# Patient Record
Sex: FEMALE | Race: OTHER | Employment: PART TIME | URBAN - METROPOLITAN AREA
[De-identification: names, ages, dates, MRNs, and addresses within clinical notes are randomized per-mention and may not be internally consistent; named-entity substitution may affect disease eponyms.]

---

## 2017-03-11 ENCOUNTER — HOSPITAL ENCOUNTER (EMERGENCY)
Facility: HOSPITAL | Age: 32
Discharge: HOME | End: 2017-03-11
Attending: EMERGENCY MEDICINE
Payer: MEDICAID

## 2017-03-11 ENCOUNTER — APPOINTMENT (OUTPATIENT)
Dept: CT IMAGING | Facility: HOSPITAL | Age: 32
End: 2017-03-11
Attending: EMERGENCY MEDICINE
Payer: MEDICAID

## 2017-03-11 VITALS
BODY MASS INDEX: 42.22 KG/M2 | DIASTOLIC BLOOD PRESSURE: 87 MMHG | HEART RATE: 72 BPM | OXYGEN SATURATION: 100 % | RESPIRATION RATE: 16 BRPM | TEMPERATURE: 98 F | HEIGHT: 67 IN | WEIGHT: 269 LBS | SYSTOLIC BLOOD PRESSURE: 159 MMHG

## 2017-03-11 DIAGNOSIS — R56.9 SEIZURE-LIKE ACTIVITY (HCC): Primary | ICD-10-CM

## 2017-03-11 LAB
ALBUMIN SERPL-MCNC: 4 G/DL (ref 3.5–4.8)
ALP LIVER SERPL-CCNC: 70 U/L (ref 37–98)
ALT SERPL-CCNC: 25 U/L (ref 14–54)
AST SERPL-CCNC: 16 U/L (ref 15–41)
BASOPHILS # BLD AUTO: 0.01 X10(3) UL (ref 0–0.1)
BASOPHILS NFR BLD AUTO: 0.2 %
BILIRUB SERPL-MCNC: 0.4 MG/DL (ref 0.1–2)
BILIRUB UR QL STRIP.AUTO: NEGATIVE
BUN BLD-MCNC: 17 MG/DL (ref 8–20)
CALCIUM BLD-MCNC: 9 MG/DL (ref 8.3–10.3)
CHLORIDE: 104 MMOL/L (ref 101–111)
CLARITY UR REFRACT.AUTO: CLEAR
CO2: 24 MMOL/L (ref 22–32)
COLOR UR AUTO: YELLOW
CREAT BLD-MCNC: 0.73 MG/DL (ref 0.55–1.02)
EOSINOPHIL # BLD AUTO: 0.01 X10(3) UL (ref 0–0.3)
EOSINOPHIL NFR BLD AUTO: 0.2 %
ERYTHROCYTE [DISTWIDTH] IN BLOOD BY AUTOMATED COUNT: 15.8 % (ref 11.5–16)
GLUCOSE BLD-MCNC: 76 MG/DL (ref 70–99)
GLUCOSE UR STRIP.AUTO-MCNC: NEGATIVE MG/DL
HCT VFR BLD AUTO: 36.2 % (ref 34–50)
HGB BLD-MCNC: 11.4 G/DL (ref 12–16)
IMMATURE GRANULOCYTE COUNT: 0 X10(3) UL (ref 0–1)
IMMATURE GRANULOCYTE RATIO %: 0 %
KETONES UR STRIP.AUTO-MCNC: 80 MG/DL
LYMPHOCYTES # BLD AUTO: 1.59 X10(3) UL (ref 0.9–4)
LYMPHOCYTES NFR BLD AUTO: 36.7 %
M PROTEIN MFR SERPL ELPH: 8 G/DL (ref 6.1–8.3)
MCH RBC QN AUTO: 24.3 PG (ref 27–33.2)
MCHC RBC AUTO-ENTMCNC: 31.5 G/DL (ref 31–37)
MCV RBC AUTO: 77 FL (ref 81–100)
MONOCYTES # BLD AUTO: 0.34 X10(3) UL (ref 0.1–0.6)
MONOCYTES NFR BLD AUTO: 7.9 %
NEUTROPHIL ABS PRELIM: 2.38 X10 (3) UL (ref 1.3–6.7)
NEUTROPHILS # BLD AUTO: 2.38 X10(3) UL (ref 1.3–6.7)
NEUTROPHILS NFR BLD AUTO: 55 %
NITRITE UR QL STRIP.AUTO: NEGATIVE
PH UR STRIP.AUTO: 5 [PH] (ref 4.5–8)
PLATELET # BLD AUTO: 307 10(3)UL (ref 150–450)
POCT LOT NUMBER: NORMAL
POCT URINE PREGNANCY: NEGATIVE
POTASSIUM SERPL-SCNC: 3.8 MMOL/L (ref 3.6–5.1)
PROT UR STRIP.AUTO-MCNC: NEGATIVE MG/DL
RBC # BLD AUTO: 4.7 X10(6)UL (ref 3.8–5.1)
RBC UR QL AUTO: NEGATIVE
RED CELL DISTRIBUTION WIDTH-SD: 43 FL (ref 35.1–46.3)
SODIUM SERPL-SCNC: 137 MMOL/L (ref 136–144)
SP GR UR STRIP.AUTO: 1.03 (ref 1–1.03)
UROBILINOGEN UR STRIP.AUTO-MCNC: <2 MG/DL
WBC # BLD AUTO: 4.3 X10(3) UL (ref 4–13)

## 2017-03-11 PROCEDURE — 99284 EMERGENCY DEPT VISIT MOD MDM: CPT

## 2017-03-11 PROCEDURE — 81025 URINE PREGNANCY TEST: CPT

## 2017-03-11 PROCEDURE — 70450 CT HEAD/BRAIN W/O DYE: CPT

## 2017-03-11 PROCEDURE — 87086 URINE CULTURE/COLONY COUNT: CPT | Performed by: EMERGENCY MEDICINE

## 2017-03-11 PROCEDURE — 85025 COMPLETE CBC W/AUTO DIFF WBC: CPT | Performed by: EMERGENCY MEDICINE

## 2017-03-11 PROCEDURE — 80053 COMPREHEN METABOLIC PANEL: CPT | Performed by: EMERGENCY MEDICINE

## 2017-03-11 PROCEDURE — 36415 COLL VENOUS BLD VENIPUNCTURE: CPT

## 2017-03-11 PROCEDURE — 81001 URINALYSIS AUTO W/SCOPE: CPT | Performed by: EMERGENCY MEDICINE

## 2017-03-11 NOTE — ED INITIAL ASSESSMENT (HPI)
PT TO ER STATING SHE WOKE UP SHAKING. PER AUNT, PT COULDN'T MOVE HER RT ARM AND LEGS, AND HER EYES WERE ROLLED BACK.  PT STATES HER SYMPTOMS COMES AND GO.

## 2017-03-11 NOTE — ED PROVIDER NOTES
Patient Seen in: [facility] Emergency Department    History   Patient presents with:  Numbness Weakness (neurologic)    Stated Complaint: arm weakness    HPI    30-year-old female complaining of stiffening of her arm.   This patient states her aunt c 1123 161/98 mmHg   Pulse 03/11/17 1123 72   Resp 03/11/17 1123 18   Temp 03/11/17 1123 97.7 °F (36.5 °C)   Temp src 03/11/17 1123 Temporal   SpO2 03/11/17 1123 100 %   O2 Device 03/11/17 1123 None (Room air)       Current:/99 mmHg  Pulse 67  Temp(Src GOLD   RAINBOW DRAW LAVENDER   RAINBOW DRAW LIGHT GREEN   URINE CULTURE, ROUTINE       MDM   IV was started labs are drawn patient's labs unremarkable CT scan of the brain is negative the patient felt better with less stiffness in her arm prior to discharg

## 2017-03-11 NOTE — ED NOTES
PT WAS ADVISED TO CHECK HER BP AT HOME AND KEEP A DIARY TO SHOW TO HER PCP. PT VERBALIZED UNDERSTANDING.

## 2023-01-18 ENCOUNTER — APPOINTMENT (OUTPATIENT)
Dept: CT IMAGING | Facility: HOSPITAL | Age: 38
End: 2023-01-18
Attending: EMERGENCY MEDICINE
Payer: MEDICAID

## 2023-01-18 ENCOUNTER — HOSPITAL ENCOUNTER (EMERGENCY)
Facility: HOSPITAL | Age: 38
Discharge: HOME | End: 2023-01-19
Attending: EMERGENCY MEDICINE
Payer: MEDICAID

## 2023-01-18 DIAGNOSIS — R51.9 NONINTRACTABLE HEADACHE, UNSPECIFIED CHRONICITY PATTERN, UNSPECIFIED HEADACHE TYPE: Primary | ICD-10-CM

## 2023-01-18 DIAGNOSIS — J01.90 ACUTE SINUSITIS, RECURRENCE NOT SPECIFIED, UNSPECIFIED LOCATION: ICD-10-CM

## 2023-01-18 LAB
ALBUMIN SERPL-MCNC: 3.2 G/DL (ref 3.4–5)
ALBUMIN/GLOB SERPL: 0.7 {RATIO} (ref 1–2)
ALP LIVER SERPL-CCNC: 81 U/L
ALT SERPL-CCNC: 18 U/L
ANION GAP SERPL CALC-SCNC: 5 MMOL/L (ref 0–18)
AST SERPL-CCNC: 22 U/L (ref 15–37)
B-HCG UR QL: NEGATIVE
BASOPHILS # BLD AUTO: 0.02 X10(3) UL (ref 0–0.2)
BASOPHILS NFR BLD AUTO: 0.2 %
BILIRUB SERPL-MCNC: 0.3 MG/DL (ref 0.1–2)
BUN BLD-MCNC: 12 MG/DL (ref 7–18)
CALCIUM BLD-MCNC: 9.2 MG/DL (ref 8.5–10.1)
CHLORIDE SERPL-SCNC: 105 MMOL/L (ref 98–112)
CO2 SERPL-SCNC: 25 MMOL/L (ref 21–32)
CREAT BLD-MCNC: 0.97 MG/DL
EOSINOPHIL # BLD AUTO: 0.05 X10(3) UL (ref 0–0.7)
EOSINOPHIL NFR BLD AUTO: 0.6 %
ERYTHROCYTE [DISTWIDTH] IN BLOOD BY AUTOMATED COUNT: 14.6 %
GFR SERPLBLD BASED ON 1.73 SQ M-ARVRAT: 78 ML/MIN/1.73M2 (ref 60–?)
GLOBULIN PLAS-MCNC: 4.4 G/DL (ref 2.8–4.4)
GLUCOSE BLD-MCNC: 132 MG/DL (ref 70–99)
HCT VFR BLD AUTO: 34.5 %
HGB BLD-MCNC: 10.7 G/DL
IMM GRANULOCYTES # BLD AUTO: 0.02 X10(3) UL (ref 0–1)
IMM GRANULOCYTES NFR BLD: 0.2 %
LYMPHOCYTES # BLD AUTO: 1.62 X10(3) UL (ref 1–4)
LYMPHOCYTES NFR BLD AUTO: 19.5 %
MCH RBC QN AUTO: 23.9 PG (ref 26–34)
MCHC RBC AUTO-ENTMCNC: 31 G/DL (ref 31–37)
MCV RBC AUTO: 77.2 FL
MONOCYTES # BLD AUTO: 0.35 X10(3) UL (ref 0.1–1)
MONOCYTES NFR BLD AUTO: 4.2 %
NEUTROPHILS # BLD AUTO: 6.25 X10 (3) UL (ref 1.5–7.7)
NEUTROPHILS # BLD AUTO: 6.25 X10(3) UL (ref 1.5–7.7)
NEUTROPHILS NFR BLD AUTO: 75.3 %
OSMOLALITY SERPL CALC.SUM OF ELEC: 282 MOSM/KG (ref 275–295)
PLATELET # BLD AUTO: 370 10(3)UL (ref 150–450)
POTASSIUM SERPL-SCNC: 4.1 MMOL/L (ref 3.5–5.1)
PROT SERPL-MCNC: 7.6 G/DL (ref 6.4–8.2)
RBC # BLD AUTO: 4.47 X10(6)UL
SODIUM SERPL-SCNC: 135 MMOL/L (ref 136–145)
WBC # BLD AUTO: 8.3 X10(3) UL (ref 4–11)

## 2023-01-18 PROCEDURE — 96361 HYDRATE IV INFUSION ADD-ON: CPT

## 2023-01-18 PROCEDURE — 70498 CT ANGIOGRAPHY NECK: CPT | Performed by: EMERGENCY MEDICINE

## 2023-01-18 PROCEDURE — 81025 URINE PREGNANCY TEST: CPT

## 2023-01-18 PROCEDURE — 99285 EMERGENCY DEPT VISIT HI MDM: CPT

## 2023-01-18 PROCEDURE — 96375 TX/PRO/DX INJ NEW DRUG ADDON: CPT

## 2023-01-18 PROCEDURE — 85025 COMPLETE CBC W/AUTO DIFF WBC: CPT | Performed by: EMERGENCY MEDICINE

## 2023-01-18 PROCEDURE — 99284 EMERGENCY DEPT VISIT MOD MDM: CPT

## 2023-01-18 PROCEDURE — 70496 CT ANGIOGRAPHY HEAD: CPT | Performed by: EMERGENCY MEDICINE

## 2023-01-18 PROCEDURE — 80053 COMPREHEN METABOLIC PANEL: CPT | Performed by: EMERGENCY MEDICINE

## 2023-01-18 PROCEDURE — 96374 THER/PROPH/DIAG INJ IV PUSH: CPT

## 2023-01-18 RX ORDER — AMLODIPINE BESYLATE AND ATORVASTATIN CALCIUM 10; 10 MG/1; MG/1
1 TABLET, FILM COATED ORAL DAILY
COMMUNITY

## 2023-01-18 RX ORDER — METOCLOPRAMIDE HYDROCHLORIDE 5 MG/ML
10 INJECTION INTRAMUSCULAR; INTRAVENOUS ONCE
Status: COMPLETED | OUTPATIENT
Start: 2023-01-18 | End: 2023-01-18

## 2023-01-18 RX ORDER — DIPHENHYDRAMINE HYDROCHLORIDE 50 MG/ML
50 INJECTION INTRAMUSCULAR; INTRAVENOUS ONCE
Status: COMPLETED | OUTPATIENT
Start: 2023-01-18 | End: 2023-01-18

## 2023-01-18 RX ORDER — LOSARTAN POTASSIUM 100 MG/1
100 TABLET ORAL DAILY
COMMUNITY

## 2023-01-18 RX ORDER — METOPROLOL SUCCINATE 50 MG/1
50 TABLET, EXTENDED RELEASE ORAL DAILY
COMMUNITY

## 2023-01-18 RX ORDER — HYDROCHLOROTHIAZIDE 25 MG/1
25 TABLET ORAL DAILY
COMMUNITY

## 2023-01-19 VITALS
DIASTOLIC BLOOD PRESSURE: 86 MMHG | TEMPERATURE: 97 F | SYSTOLIC BLOOD PRESSURE: 162 MMHG | RESPIRATION RATE: 23 BRPM | OXYGEN SATURATION: 98 % | HEART RATE: 92 BPM

## 2023-01-19 RX ORDER — DOXYCYCLINE HYCLATE 100 MG/1
100 CAPSULE ORAL 2 TIMES DAILY
Qty: 14 CAPSULE | Refills: 0 | Status: SHIPPED | OUTPATIENT
Start: 2023-01-19 | End: 2023-01-26

## 2023-01-19 RX ORDER — METOCLOPRAMIDE 10 MG/1
10 TABLET ORAL 3 TIMES DAILY PRN
Qty: 20 TABLET | Refills: 0 | Status: SHIPPED | OUTPATIENT
Start: 2023-01-19 | End: 2023-02-18

## 2023-01-19 NOTE — DISCHARGE INSTRUCTIONS
Call and follow-up with Dr. Jasson Hilario tomorrow. Follow-up with your doctor. Return if any severe headache, fevers or chills. You were seen in the emergency room in a limited time. There is a possibility that although we do not see any acute process at this present time that things can change with time. Is therefore imperative that you follow-up with primary care physician for close follow-up. If there is any significant progression of your pain  or other symptoms you to return immediately to the emergency room.

## 2023-01-19 NOTE — ED INITIAL ASSESSMENT (HPI)
Around December 17th, watery substance leaking out of nostril. Pt started experiencing headaches as well. Went to  and was diagnosed with double ear infection and given nasal spray for runny nose. Went to Er and did CT scan and MRI and had a possible CSF leak, tried to transfer out but no available beds so was referred outpt to a CSF clinic with appointment in February. Was told to come here for increasing headache.

## 2023-01-27 ENCOUNTER — TELEPHONE (OUTPATIENT)
Dept: NEUROLOGY | Facility: CLINIC | Age: 38
End: 2023-01-27

## 2023-01-27 PROBLEM — F41.8 MIXED ANXIETY AND DEPRESSIVE DISORDER: Status: ACTIVE | Noted: 2021-05-26

## 2023-01-27 PROBLEM — D50.9 IRON DEFICIENCY ANEMIA: Status: ACTIVE | Noted: 2019-03-08

## 2023-01-27 PROBLEM — I10 BENIGN ESSENTIAL HYPERTENSION: Status: ACTIVE | Noted: 2019-03-08

## 2023-01-27 PROBLEM — T78.40XA ALLERGY: Status: ACTIVE | Noted: 2023-01-27

## 2023-01-27 PROBLEM — J45.909 ASTHMA: Status: ACTIVE | Noted: 2023-01-27

## 2023-01-27 PROBLEM — J45.909 ASTHMA (HCC): Status: ACTIVE | Noted: 2023-01-27

## 2023-01-27 PROBLEM — R56.9 SEIZURE (HCC): Status: ACTIVE | Noted: 2018-04-06

## 2023-01-30 ENCOUNTER — APPOINTMENT (OUTPATIENT)
Dept: CT IMAGING | Facility: HOSPITAL | Age: 38
End: 2023-01-30
Payer: MEDICAID

## 2023-01-30 ENCOUNTER — HOSPITAL ENCOUNTER (INPATIENT)
Facility: HOSPITAL | Age: 38
End: 2023-01-30
Attending: HOSPITALIST | Admitting: HOSPITALIST
Payer: MEDICAID

## 2023-01-30 ENCOUNTER — OFFICE VISIT (OUTPATIENT)
Dept: SURGERY | Facility: CLINIC | Age: 38
End: 2023-01-30
Payer: MEDICAID

## 2023-01-30 ENCOUNTER — APPOINTMENT (OUTPATIENT)
Dept: INTERVENTIONAL RADIOLOGY/VASCULAR | Facility: HOSPITAL | Age: 38
End: 2023-01-30
Payer: MEDICAID

## 2023-01-30 ENCOUNTER — HOSPITAL ENCOUNTER (INPATIENT)
Facility: HOSPITAL | Age: 38
LOS: 14 days | Discharge: HOME | End: 2023-02-13
Attending: HOSPITALIST | Admitting: HOSPITALIST
Payer: MEDICAID

## 2023-01-30 ENCOUNTER — TELEPHONE (OUTPATIENT)
Dept: NEUROLOGY | Facility: CLINIC | Age: 38
End: 2023-01-30

## 2023-01-30 ENCOUNTER — TELEPHONE (OUTPATIENT)
Dept: SURGERY | Facility: CLINIC | Age: 38
End: 2023-01-30

## 2023-01-30 VITALS
WEIGHT: 293 LBS | DIASTOLIC BLOOD PRESSURE: 72 MMHG | SYSTOLIC BLOOD PRESSURE: 130 MMHG | BODY MASS INDEX: 45.99 KG/M2 | OXYGEN SATURATION: 98 % | HEART RATE: 76 BPM | HEIGHT: 67 IN

## 2023-01-30 DIAGNOSIS — G96.01 CSF LEAK FROM NOSE: Primary | ICD-10-CM

## 2023-01-30 DIAGNOSIS — G93.2 IIH (IDIOPATHIC INTRACRANIAL HYPERTENSION): ICD-10-CM

## 2023-01-30 PROBLEM — G96.00 CSF LEAK: Status: ACTIVE | Noted: 2023-01-30

## 2023-01-30 LAB
ANION GAP SERPL CALC-SCNC: 4 MMOL/L (ref 0–18)
ANTIBODY SCREEN: NEGATIVE
APTT PPP: 28.6 SECONDS (ref 23.3–35.6)
BASOPHILS # BLD AUTO: 0.03 X10(3) UL (ref 0–0.2)
BASOPHILS NFR BLD AUTO: 0.6 %
BUN BLD-MCNC: 10 MG/DL (ref 7–18)
CALCIUM BLD-MCNC: 8.6 MG/DL (ref 8.5–10.1)
CHLORIDE SERPL-SCNC: 107 MMOL/L (ref 98–112)
CLARITY CSF: CLEAR
CO2 SERPL-SCNC: 26 MMOL/L (ref 21–32)
COLOR CSF: COLORLESS
COUNT PERFORMED ON TUBE: 1
CREAT BLD-MCNC: 0.85 MG/DL
EOSINOPHIL # BLD AUTO: 0.05 X10(3) UL (ref 0–0.7)
EOSINOPHIL NFR BLD AUTO: 0.9 %
ERYTHROCYTE [DISTWIDTH] IN BLOOD BY AUTOMATED COUNT: 14.7 %
GFR SERPLBLD BASED ON 1.73 SQ M-ARVRAT: 92 ML/MIN/1.73M2 (ref 60–?)
GLUCOSE BLD-MCNC: 92 MG/DL (ref 70–99)
GLUCOSE CSF-MCNC: 59 MG/DL (ref 40–70)
HCT VFR BLD AUTO: 34.8 %
HGB BLD-MCNC: 10.3 G/DL
IMM GRANULOCYTES # BLD AUTO: 0.01 X10(3) UL (ref 0–1)
IMM GRANULOCYTES NFR BLD: 0.2 %
INR BLD: 1.16 (ref 0.85–1.16)
LYMPHOCYTES # BLD AUTO: 1.65 X10(3) UL (ref 1–4)
LYMPHOCYTES NFR BLD AUTO: 30.5 %
MCH RBC QN AUTO: 23.6 PG (ref 26–34)
MCHC RBC AUTO-ENTMCNC: 29.6 G/DL (ref 31–37)
MCV RBC AUTO: 79.8 FL
MONOCYTES # BLD AUTO: 0.28 X10(3) UL (ref 0.1–1)
MONOCYTES NFR BLD AUTO: 5.2 %
NEUTROPHILS # BLD AUTO: 3.39 X10 (3) UL (ref 1.5–7.7)
NEUTROPHILS # BLD AUTO: 3.39 X10(3) UL (ref 1.5–7.7)
NEUTROPHILS NFR BLD AUTO: 62.6 %
OSMOLALITY SERPL CALC.SUM OF ELEC: 283 MOSM/KG (ref 275–295)
PLATELET # BLD AUTO: 363 10(3)UL (ref 150–450)
POTASSIUM SERPL-SCNC: 3.8 MMOL/L (ref 3.5–5.1)
PROT PATTERN CSF ELPH-IMP: 22.1 MG/DL (ref 15–45)
PROTHROMBIN TIME: 14.8 SECONDS (ref 11.6–14.8)
RBC # BLD AUTO: 4.36 X10(6)UL
RBC # CSF: 31 /MM3 (ref ?–1)
RH BLOOD TYPE: POSITIVE
SODIUM SERPL-SCNC: 137 MMOL/L (ref 136–145)
TOTAL CELLS COUNTED CSF: 1 /MM3 (ref 0–5)
TOTAL VOLUME CSF: 7 ML
WBC # BLD AUTO: 5.4 X10(3) UL (ref 4–11)

## 2023-01-30 PROCEDURE — 3078F DIAST BP <80 MM HG: CPT | Performed by: OTHER

## 2023-01-30 PROCEDURE — 99223 1ST HOSP IP/OBS HIGH 75: CPT | Performed by: HOSPITALIST

## 2023-01-30 PROCEDURE — 3074F SYST BP LT 130 MM HG: CPT | Performed by: OTHER

## 2023-01-30 PROCEDURE — 70486 CT MAXILLOFACIAL W/O DYE: CPT

## 2023-01-30 PROCEDURE — 99291 CRITICAL CARE FIRST HOUR: CPT | Performed by: OTHER

## 2023-01-30 RX ORDER — LABETALOL HYDROCHLORIDE 5 MG/ML
10 INJECTION, SOLUTION INTRAVENOUS EVERY 2 HOUR PRN
Status: DISCONTINUED | OUTPATIENT
Start: 2023-01-30 | End: 2023-02-13

## 2023-01-30 RX ORDER — AMLODIPINE BESYLATE 10 MG/1
TABLET ORAL DAILY
Status: ON HOLD | COMMUNITY

## 2023-01-30 RX ORDER — PROCHLORPERAZINE EDISYLATE 5 MG/ML
5 INJECTION INTRAMUSCULAR; INTRAVENOUS EVERY 8 HOURS PRN
Status: DISCONTINUED | OUTPATIENT
Start: 2023-01-30 | End: 2023-02-03

## 2023-01-30 RX ORDER — LOSARTAN POTASSIUM 100 MG/1
100 TABLET ORAL DAILY
Status: DISCONTINUED | OUTPATIENT
Start: 2023-01-30 | End: 2023-02-13

## 2023-01-30 RX ORDER — POTASSIUM CHLORIDE 20 MEQ/1
40 TABLET, EXTENDED RELEASE ORAL ONCE
Status: COMPLETED | OUTPATIENT
Start: 2023-01-30 | End: 2023-01-30

## 2023-01-30 RX ORDER — SODIUM CHLORIDE 9 MG/ML
INJECTION, SOLUTION INTRAVENOUS CONTINUOUS
Status: DISCONTINUED | OUTPATIENT
Start: 2023-01-30 | End: 2023-01-31

## 2023-01-30 RX ORDER — ONDANSETRON 2 MG/ML
4 INJECTION INTRAMUSCULAR; INTRAVENOUS EVERY 6 HOURS PRN
Status: DISCONTINUED | OUTPATIENT
Start: 2023-01-30 | End: 2023-02-03

## 2023-01-30 RX ORDER — ONDANSETRON 4 MG/1
TABLET, ORALLY DISINTEGRATING ORAL
Status: ON HOLD | COMMUNITY

## 2023-01-30 RX ORDER — MIDAZOLAM HYDROCHLORIDE 1 MG/ML
INJECTION INTRAMUSCULAR; INTRAVENOUS
Status: COMPLETED
Start: 2023-01-30 | End: 2023-01-30

## 2023-01-30 RX ORDER — VENLAFAXINE HYDROCHLORIDE 225 MG/1
TABLET, EXTENDED RELEASE ORAL DAILY
COMMUNITY
End: 2023-01-30 | Stop reason: ALTCHOICE

## 2023-01-30 RX ORDER — QUETIAPINE FUMARATE 25 MG/1
TABLET, FILM COATED ORAL DAILY
COMMUNITY
End: 2023-01-30 | Stop reason: ALTCHOICE

## 2023-01-30 RX ORDER — CLINDAMYCIN HYDROCHLORIDE 300 MG/1
CAPSULE ORAL
COMMUNITY
End: 2023-01-30 | Stop reason: ALTCHOICE

## 2023-01-30 RX ORDER — HYDRALAZINE HYDROCHLORIDE 20 MG/ML
10 INJECTION INTRAMUSCULAR; INTRAVENOUS EVERY 2 HOUR PRN
Status: DISCONTINUED | OUTPATIENT
Start: 2023-01-30 | End: 2023-02-13

## 2023-01-30 RX ORDER — AMLODIPINE BESYLATE 5 MG/1
10 TABLET ORAL DAILY
Status: DISCONTINUED | OUTPATIENT
Start: 2023-01-30 | End: 2023-02-13

## 2023-01-30 RX ORDER — HYDROCODONE BITARTRATE AND ACETAMINOPHEN 10; 325 MG/1; MG/1
1 TABLET ORAL EVERY 4 HOURS PRN
Status: DISCONTINUED | OUTPATIENT
Start: 2023-01-30 | End: 2023-02-13

## 2023-01-30 RX ORDER — ACETAMINOPHEN 500 MG
500 TABLET ORAL EVERY 4 HOURS PRN
Status: DISCONTINUED | OUTPATIENT
Start: 2023-01-30 | End: 2023-02-13

## 2023-01-30 RX ORDER — METOPROLOL SUCCINATE 50 MG/1
50 TABLET, EXTENDED RELEASE ORAL
Status: DISCONTINUED | OUTPATIENT
Start: 2023-01-30 | End: 2023-02-13

## 2023-01-30 RX ORDER — FERROUS SULFATE 325(65) MG
TABLET ORAL DAILY
Status: ON HOLD | COMMUNITY

## 2023-01-30 RX ORDER — DEXAMETHASONE 1 MG
TABLET ORAL NIGHTLY
COMMUNITY
End: 2023-01-30 | Stop reason: ALTCHOICE

## 2023-01-30 RX ORDER — ENOXAPARIN SODIUM 100 MG/ML
40 INJECTION SUBCUTANEOUS DAILY
Status: DISCONTINUED | OUTPATIENT
Start: 2023-01-30 | End: 2023-01-30

## 2023-01-30 RX ORDER — EPINEPHRINE 0.3 MG/.3ML
0.3 INJECTION SUBCUTANEOUS AS NEEDED
COMMUNITY
End: 2023-01-30

## 2023-01-30 RX ORDER — HYDROCHLOROTHIAZIDE 25 MG/1
25 TABLET ORAL DAILY
Status: DISCONTINUED | OUTPATIENT
Start: 2023-01-30 | End: 2023-02-13

## 2023-01-30 RX ORDER — CLINDAMYCIN PHOSPHATE 900 MG/50ML
INJECTION INTRAVENOUS
Status: COMPLETED
Start: 2023-01-30 | End: 2023-01-30

## 2023-01-30 RX ORDER — FLUTICASONE PROPIONATE 50 MCG
SPRAY, SUSPENSION (ML) NASAL
COMMUNITY
Start: 2022-12-30 | End: 2023-01-30 | Stop reason: ALTCHOICE

## 2023-01-30 RX ORDER — PRAZOSIN HYDROCHLORIDE 1 MG/1
CAPSULE ORAL NIGHTLY
COMMUNITY
End: 2023-01-30

## 2023-01-30 NOTE — TELEPHONE ENCOUNTER
Direct Admission if under our surgeon follow these steps:    Direct Admit to hospital per Dr. Felix Ricks for CSF leak. Orders:  Ortho/Neuro floor. (bed type, additional instructions such as imaging requests, labs)    1. Page on call neuro-surgeon with admission information. (Only if directed by Neurosurgeon in office) usually admitting surgeon will be the one informing us of need for direct admit. 2. Reach out directly to:Emergency Department  atat [phone] (call [phone] if that line is not working) for direct admission or transfer requests to [facility]. Inform them of admit orders. Mirna Martinez who directed Nursing to call transfer center. Spoke with Britt Noyola. Per Britt Noyola, Dr. Felix Ricks to call Dr. Siemon Merino in ICU. Nursing to call [facility].     3. Inform pt is to be admitted under Hospitalist.     4. Insurance verification is now done by . Confirmed ---( ) will verify insurance. 5.  Nursing Supervisor will be notified by . Confirmed --- () will notify nursing supervisor. Nursing supervisor on shift is ---.    6. Per  --- Patient to present to the ER Registration desk in [facility]. Patient to inform staff that Direct Admission as been arranged. Patient should present to [place] on --- around ---. Room # (if available) nurse  name and # (if available)    7. If nurse information provided, call to provide handoff and report. Called and spoke to ---, hand off conducted at ------(time)    8. Patient notified of pending admission. Verbalized understanding of instructions. If Direct Admission under Hospitalist follow these steps:      Per Dr. Felix Ricks pt to be admitted directly to hospital for CSF leak (admitting diagnosis). Orders:  ICU(bed type, additional instructions such as imaging requests, labs), lumbar drain.        1. Reach out directly to:Emergency Department  at [phone] for direct admission to [facility]. See if there is a bed available for direct admit and inform provider Dr. Debbi Franklin is requesting pt be admitted under Hospitalist.         Inform them of admit orders. 2.  Page [facility] Hospitalist at #[phone]. Dr. Mary Araya. This is usually a page and they will call back, give them back line number. If answering service takes call provide pt name, , inform them our provider is requesting a direct admit under hospitalist and request call back. Make sure to have all pt information prior to call back: pt name, , admitting diagnosis, when pt was seen by our provider, background story as to why pt is being admitted. 3.  Once hospitalist returned page, provide pt information, inform of direct admission request as per Dr. Debbi Franklin. Spoke with Dr. Mary Araya who agreed to admit patient. Provided background for reason of admission. Hospitalist  Dr. Mary Araya (accept admission or deny request)      4. Call  back inform hospitalist has accepted direct admission. Linh(), will stated patient should present to [facility] on --- (date) at --- (time), patient to present to the ER Registration desk in [facility]. Patient to inform staff that Direct Admission has been arranged. Room # (if available) nurse  name and # (if available)    5. Insurance verification is now done by . Confirmed ---( ) will verify insurance. 6.  Nursing Supervisor will be notified by . Confirmed --- () will notify nursing supervisor. Nursing supervisor on shift is ---.    7. Call patient or EC and inform of direct admission date and time of arrival, if room information is available provide it. Verify --- verbalized understanding of these instructions. 8. If nurse information provided, call to provide handoff and report.   Called and spoke to ---, hand off conducted at ---(time)    Sung Isaac at [facility] who stated that providers and house supervisor have all been notified and patient may be sent to outpatient registration to enter into hospital for admission.

## 2023-01-30 NOTE — TELEPHONE ENCOUNTER
CT Head/Brain, MR spine cervical, MR Brain, MR Spine Thoracic reports from [facility] [place] received by MA clinical staff, endorsed to provider for review. Placed on  [facility]. Will be sent to scanning once received back from provider.

## 2023-01-30 NOTE — TELEPHONE ENCOUNTER
Pt brought in imaging from [facility] ADA of MRI Spine Cervical, MRI Thoracic, MRI Brain w/o contrast and CT Head/Brain w/o contrast, images uploaded to PACS, disc given back to pt.

## 2023-01-31 ENCOUNTER — TELEPHONE (OUTPATIENT)
Dept: SURGERY | Facility: CLINIC | Age: 38
End: 2023-01-31

## 2023-01-31 LAB
ANION GAP SERPL CALC-SCNC: 4 MMOL/L (ref 0–18)
BUN BLD-MCNC: 12 MG/DL (ref 7–18)
CALCIUM BLD-MCNC: 8.7 MG/DL (ref 8.5–10.1)
CHLORIDE SERPL-SCNC: 103 MMOL/L (ref 98–112)
CO2 SERPL-SCNC: 28 MMOL/L (ref 21–32)
CREAT BLD-MCNC: 0.89 MG/DL
ERYTHROCYTE [DISTWIDTH] IN BLOOD BY AUTOMATED COUNT: 14.8 %
GFR SERPLBLD BASED ON 1.73 SQ M-ARVRAT: 87 ML/MIN/1.73M2 (ref 60–?)
GLUCOSE BLD-MCNC: 107 MG/DL (ref 70–99)
HCT VFR BLD AUTO: 34.3 %
HGB BLD-MCNC: 10.3 G/DL
MCH RBC QN AUTO: 23.8 PG (ref 26–34)
MCHC RBC AUTO-ENTMCNC: 30 G/DL (ref 31–37)
MCV RBC AUTO: 79.4 FL
OSMOLALITY SERPL CALC.SUM OF ELEC: 280 MOSM/KG (ref 275–295)
PLATELET # BLD AUTO: 358 10(3)UL (ref 150–450)
POTASSIUM SERPL-SCNC: 4.2 MMOL/L (ref 3.5–5.1)
POTASSIUM SERPL-SCNC: 4.2 MMOL/L (ref 3.5–5.1)
RBC # BLD AUTO: 4.32 X10(6)UL
SARS-COV-2 RNA RESP QL NAA+PROBE: NOT DETECTED
SODIUM SERPL-SCNC: 135 MMOL/L (ref 136–145)
WBC # BLD AUTO: 5.3 X10(3) UL (ref 4–11)

## 2023-01-31 PROCEDURE — 99233 SBSQ HOSP IP/OBS HIGH 50: CPT | Performed by: HOSPITALIST

## 2023-01-31 PROCEDURE — 99291 CRITICAL CARE FIRST HOUR: CPT | Performed by: OTHER

## 2023-01-31 PROCEDURE — 99291 CRITICAL CARE FIRST HOUR: CPT | Performed by: NEUROLOGICAL SURGERY

## 2023-01-31 PROCEDURE — 99231 SBSQ HOSP IP/OBS SF/LOW 25: CPT

## 2023-01-31 PROCEDURE — 0CJY8ZZ INSPECTION OF MOUTH AND THROAT, VIA NATURAL OR ARTIFICIAL OPENING ENDOSCOPIC: ICD-10-PCS | Performed by: OTOLARYNGOLOGY

## 2023-01-31 RX ORDER — ACETAZOLAMIDE 500 MG/1
500 CAPSULE, EXTENDED RELEASE ORAL 2 TIMES DAILY
Status: DISCONTINUED | OUTPATIENT
Start: 2023-01-31 | End: 2023-02-13

## 2023-01-31 NOTE — DISCHARGE INSTRUCTIONS
Here are the referrals you asked for to see a psychiatrist and psychotherapist-       [address] ([facility])   [address] [place] [address], [address] [phone]    [facility]  Call them to find out which one of their locations have a provider for you  [address] ([address] [place] [address])   [address], [address]   [phone]: [phone]

## 2023-01-31 NOTE — PLAN OF CARE
Assumed care of patient at 1600. Neuro checks q1h. Pt oriented x4. Pt 5/5 on all extremities. Pt complains of a headache and received prn medications as ordered. Pt complained of nausea and received PRN Zofran. Drained 10cc from the lumbar drain q1h. CSF is pink tinged but clear. Problem: NEUROLOGICAL - ADULT  Goal: Achieves stable or improved neurological status  Description: INTERVENTIONS  - Assess for and report changes in neurological status  - Initiate measures to prevent increased intracranial pressure  - Maintain blood pressure and fluid volume within ordered parameters to optimize cerebral perfusion and minimize risk of hemorrhage  - Monitor temperature, glucose, and sodium.  Initiate appropriate interventions as ordered  Outcome: Progressing     Problem: PAIN - ADULT  Goal: Verbalizes/displays adequate comfort level or patient's stated pain goal  Description: INTERVENTIONS:  - Encourage pt to monitor pain and request assistance  - Assess pain using appropriate pain scale  - Administer analgesics based on type and severity of pain and evaluate response  - Implement non-pharmacological measures as appropriate and evaluate response  - Consider cultural and social influences on pain and pain management  - Manage/alleviate anxiety  - Utilize distraction and/or relaxation techniques  - Monitor for opioid side effects  - Notify MD/LIP if interventions unsuccessful or patient reports new pain  - Anticipate increased pain with activity and pre-medicate as appropriate  Outcome: Progressing

## 2023-01-31 NOTE — PROCEDURES
Due to inability for adequate examination of the nasopharynx and need for magnification to perform the examination, endoscopy was performed. Risks and benefits were discussed with patient/family and they have agreed to proceed. Procedure: Nasopharyngoscopy with flexible fiberoptic endoscope. Anesthesia: NOne. EBL: 0 cc. Surgeon: Baylee Craig. Complications: None apparent. Anterior nose:  No lesions or crusts  Septum: Straight, no prominent vessels  Nasal cavity:  Moist mucosa, no lesions, no purulent rhinorrhea. Air bubble on L sphenoid, no drainage on valsalva  Nasopharynx: No masses, normal adenoids  Pharynx:  No lesions on posterior wall  Valleculae:  No lesions  Epiglottis:  No lesions, normal shape  Arytenoids:  No lesions. Mild pachydermia seen. False cords:  No lesions  True cords:  No lesions, polyps or nodules.   Normal mobility  Subglottis:  No lesions noted    Other:

## 2023-01-31 NOTE — PROGRESS NOTES
23 1316   Over the last 2 weeks, how often have you been bothered by any of the following problems? Little interest or pleasure in doing things 1   Feeling down, depressed, or hopeless 1   Trouble falling or staying asleep, or sleeping too much 2   Feeling tired or having little energy 1   Poor appetite or overeating 2  (stating over eating due to her circumstances)   Feeling bad about yourself - or that you are a failure or have let yourself or your family down 1   Trouble concentrating on things, such as reading the newspaper or watching television 0   Moving or speaking so slowly that other people could have noticed. Or the opposite - being so fidgety or restless that you have been moving around a lot more than usual 0   Thoughts that you would be better off dead, or of hurting yourself in some way 0   PHQ-9 TOTAL SCORE [address] [facility] Resource Referral Counselor Note    Rohan Pang Patient Status:  Inpatient    1985 MRN [id number]   [facility] 6NE-A Attending Alexsandra Raya MD   Hosp Day # 1 PCP Nilam Jimenez MD       S(subjective) The patient states she has a history of anxiety and depression. She said, she use to see a psychiatrist and a psychotherapist.  Her most recent psychotherapist she reports moved to [facility].  Patient states she has been depressed and anxious. She admits to having poor sleep and increased food intake. She denies any current suicidal thoughts. O(objective) The patient is alert and oriented x4. Her mood and affect is wnl. A(assessment) The phq9 was completed.     P(plan) The patient is willing to have referrals placed in the discharge summary for her to follow up with a psychotherapist and psychiatrist.      Sami Singh RN  2023  1:17 PM

## 2023-01-31 NOTE — PLAN OF CARE
Assumed care of pt this evening. Neuro checks q1 hr completed. Pt reporting intermittent numbness/tingling to RUE and RLE/LLE's. Lumbar drain open to gravity draining 10mLs/hr as ordered. Left HA pain managed w/ tylenol. Pt updated on POC. Problem: NEUROLOGICAL - ADULT  Goal: Achieves stable or improved neurological status  Description: INTERVENTIONS  - Assess for and report changes in neurological status  - Initiate measures to prevent increased intracranial pressure  - Maintain blood pressure and fluid volume within ordered parameters to optimize cerebral perfusion and minimize risk of hemorrhage  - Monitor temperature, glucose, and sodium.  Initiate appropriate interventions as ordered  Outcome: Progressing  Goal: Remains free of injury related to seizure activity  Description: INTERVENTIONS:  - Maintain airway, patient safety  and administer oxygen as ordered  - Monitor patient for seizure activity, document and report duration and description of seizure to MD/LIP  - If seizure occurs, turn patient to side and suction secretions as needed  - Reorient patient post seizure  - Seizure pads on all 4 side rails  - Instruct patient/family to notify RN of any seizure activity  - Instruct patient/family to call for assistance with activity based on assessment  Outcome: Progressing  Goal: Achieves maximal functionality and self care  Description: INTERVENTIONS  - Monitor swallowing and airway patency with patient fatigue and changes in neurological status  - Encourage and assist patient to increase activity and self care with guidance from PT/OT  - Encourage visually impaired, hearing impaired and aphasic patients to use assistive/communication devices  Outcome: Progressing     Problem: PAIN - ADULT  Goal: Verbalizes/displays adequate comfort level or patient's stated pain goal  Description: INTERVENTIONS:  - Encourage pt to monitor pain and request assistance  - Assess pain using appropriate pain scale  - Administer analgesics based on type and severity of pain and evaluate response  - Implement non-pharmacological measures as appropriate and evaluate response  - Consider cultural and social influences on pain and pain management  - Manage/alleviate anxiety  - Utilize distraction and/or relaxation techniques  - Monitor for opioid side effects  - Notify MD/LIP if interventions unsuccessful or patient reports new pain  - Anticipate increased pain with activity and pre-medicate as appropriate  Outcome: Progressing

## 2023-02-01 LAB
ANION GAP SERPL CALC-SCNC: 9 MMOL/L (ref 0–18)
BUN BLD-MCNC: 14 MG/DL (ref 7–18)
CALCIUM BLD-MCNC: 9.1 MG/DL (ref 8.5–10.1)
CHLORIDE SERPL-SCNC: 101 MMOL/L (ref 98–112)
CO2 SERPL-SCNC: 26 MMOL/L (ref 21–32)
CREAT BLD-MCNC: 0.9 MG/DL
ERYTHROCYTE [DISTWIDTH] IN BLOOD BY AUTOMATED COUNT: 15 %
GFR SERPLBLD BASED ON 1.73 SQ M-ARVRAT: 85 ML/MIN/1.73M2 (ref 60–?)
GLUCOSE BLD-MCNC: 171 MG/DL (ref 70–99)
HCT VFR BLD AUTO: 34.3 %
HGB BLD-MCNC: 10.4 G/DL
MCH RBC QN AUTO: 23.7 PG (ref 26–34)
MCHC RBC AUTO-ENTMCNC: 30.3 G/DL (ref 31–37)
MCV RBC AUTO: 78.1 FL
OSMOLALITY SERPL CALC.SUM OF ELEC: 287 MOSM/KG (ref 275–295)
PLATELET # BLD AUTO: 330 10(3)UL (ref 150–450)
POTASSIUM SERPL-SCNC: 4 MMOL/L (ref 3.5–5.1)
RBC # BLD AUTO: 4.39 X10(6)UL
SODIUM SERPL-SCNC: 136 MMOL/L (ref 136–145)
WBC # BLD AUTO: 5.2 X10(3) UL (ref 4–11)

## 2023-02-01 PROCEDURE — 99232 SBSQ HOSP IP/OBS MODERATE 35: CPT | Performed by: HOSPITALIST

## 2023-02-01 PROCEDURE — 99291 CRITICAL CARE FIRST HOUR: CPT | Performed by: NEUROLOGICAL SURGERY

## 2023-02-01 NOTE — PLAN OF CARE
Assumed care of patient about 0730. Neuros q1 per order, charted in flowsheets. Main deficits being decreased sensation in LUE. Bilateral numbness in feet. Intermittent blurred vision. Lumber drain in place with old drainage on dressing. 10cc/hr of serous drainage output per order. Norco given for HA with relief. Patient complained of nausea this am, zofran given with relief. Patient tolerating meals but had a decreased appetite. SBP less than 150 per order with no need for prns. IS up to 1500. Fair urine output with IVF dc today. Patient did fairly well getting up to chair with x1 assist. Patient did complain of slight dizziness while walking and felt like she could feel a drop of CSF in her left nare while bending down. POC discussed with patient at bedside. Problem: NEUROLOGICAL - ADULT  Goal: Achieves stable or improved neurological status  Description: INTERVENTIONS  - Assess for and report changes in neurological status  - Initiate measures to prevent increased intracranial pressure  - Maintain blood pressure and fluid volume within ordered parameters to optimize cerebral perfusion and minimize risk of hemorrhage  - Monitor temperature, glucose, and sodium.  Initiate appropriate interventions as ordered  Outcome: Progressing     Problem: PAIN - ADULT  Goal: Verbalizes/displays adequate comfort level or patient's stated pain goal  Description: INTERVENTIONS:  - Encourage pt to monitor pain and request assistance  - Assess pain using appropriate pain scale  - Administer analgesics based on type and severity of pain and evaluate response  - Implement non-pharmacological measures as appropriate and evaluate response  - Consider cultural and social influences on pain and pain management  - Manage/alleviate anxiety  - Utilize distraction and/or relaxation techniques  - Monitor for opioid side effects  - Notify MD/LIP if interventions unsuccessful or patient reports new pain  - Anticipate increased pain with activity and pre-medicate as appropriate  Outcome: Progressing

## 2023-02-01 NOTE — PLAN OF CARE
Assumed care of pt this evening. Neuro checks remain at baseline. Decreased sensation to LUE. Numbness to RLE/LLE's. C/o nausea and dizziness w/ ambulating. Pt medicated w/ zofranx1 and compazinex1, moderate relief. Lumbar drain maintained, draining 10cc's/hr as ordered. SBP parameters 150< met. Admits to mild left-sided HA, managed well w/ norcox1. Adequately voiding per external catheter. Pt updated on POC. Problem: NEUROLOGICAL - ADULT  Goal: Achieves stable or improved neurological status  Description: INTERVENTIONS  - Assess for and report changes in neurological status  - Initiate measures to prevent increased intracranial pressure  - Maintain blood pressure and fluid volume within ordered parameters to optimize cerebral perfusion and minimize risk of hemorrhage  - Monitor temperature, glucose, and sodium.  Initiate appropriate interventions as ordered  Outcome: Progressing  Goal: Remains free of injury related to seizure activity  Description: INTERVENTIONS:  - Maintain airway, patient safety  and administer oxygen as ordered  - Monitor patient for seizure activity, document and report duration and description of seizure to MD/LIP  - If seizure occurs, turn patient to side and suction secretions as needed  - Reorient patient post seizure  - Seizure pads on all 4 side rails  - Instruct patient/family to notify RN of any seizure activity  - Instruct patient/family to call for assistance with activity based on assessment  Outcome: Progressing  Goal: Achieves maximal functionality and self care  Description: INTERVENTIONS  - Monitor swallowing and airway patency with patient fatigue and changes in neurological status  - Encourage and assist patient to increase activity and self care with guidance from PT/OT  - Encourage visually impaired, hearing impaired and aphasic patients to use assistive/communication devices  Outcome: Progressing     Problem: PAIN - ADULT  Goal: Verbalizes/displays adequate comfort level or patient's stated pain goal  Description: INTERVENTIONS:  - Encourage pt to monitor pain and request assistance  - Assess pain using appropriate pain scale  - Administer analgesics based on type and severity of pain and evaluate response  - Implement non-pharmacological measures as appropriate and evaluate response  - Consider cultural and social influences on pain and pain management  - Manage/alleviate anxiety  - Utilize distraction and/or relaxation techniques  - Monitor for opioid side effects  - Notify MD/LIP if interventions unsuccessful or patient reports new pain  - Anticipate increased pain with activity and pre-medicate as appropriate  Outcome: Progressing

## 2023-02-02 LAB
ANION GAP SERPL CALC-SCNC: 6 MMOL/L (ref 0–18)
BUN BLD-MCNC: 21 MG/DL (ref 7–18)
CALCIUM BLD-MCNC: 9.3 MG/DL (ref 8.5–10.1)
CHLORIDE SERPL-SCNC: 105 MMOL/L (ref 98–112)
CO2 SERPL-SCNC: 25 MMOL/L (ref 21–32)
CREAT BLD-MCNC: 1.04 MG/DL
ERYTHROCYTE [DISTWIDTH] IN BLOOD BY AUTOMATED COUNT: 14.9 %
GFR SERPLBLD BASED ON 1.73 SQ M-ARVRAT: 72 ML/MIN/1.73M2 (ref 60–?)
GLUCOSE BLD-MCNC: 125 MG/DL (ref 70–99)
HCT VFR BLD AUTO: 34.4 %
HGB BLD-MCNC: 10.2 G/DL
MCH RBC QN AUTO: 23.3 PG (ref 26–34)
MCHC RBC AUTO-ENTMCNC: 29.7 G/DL (ref 31–37)
MCV RBC AUTO: 78.7 FL
OSMOLALITY SERPL CALC.SUM OF ELEC: 286 MOSM/KG (ref 275–295)
PLATELET # BLD AUTO: 384 10(3)UL (ref 150–450)
POTASSIUM SERPL-SCNC: 3.7 MMOL/L (ref 3.5–5.1)
RBC # BLD AUTO: 4.37 X10(6)UL
SODIUM SERPL-SCNC: 136 MMOL/L (ref 136–145)
WBC # BLD AUTO: 5.5 X10(3) UL (ref 4–11)

## 2023-02-02 PROCEDURE — 99231 SBSQ HOSP IP/OBS SF/LOW 25: CPT

## 2023-02-02 PROCEDURE — 99291 CRITICAL CARE FIRST HOUR: CPT | Performed by: NEUROLOGICAL SURGERY

## 2023-02-02 PROCEDURE — 99232 SBSQ HOSP IP/OBS MODERATE 35: CPT | Performed by: HOSPITALIST

## 2023-02-02 RX ORDER — POTASSIUM CHLORIDE 20 MEQ/1
40 TABLET, EXTENDED RELEASE ORAL ONCE
Status: COMPLETED | OUTPATIENT
Start: 2023-02-02 | End: 2023-02-02

## 2023-02-02 NOTE — PLAN OF CARE
Assumed care of pt this evening. Rec'd pt in chair, appears comfortable. When transferring pt back to bed this evening, pt reports few drops of serous fluid from left nare. Neuro checks remain at baseline. C/o mild HA. Medicated w/ norco, moderate relief. Lumbar drain open to gravity, draining 10ccs/hr per order. Drainage bag changed this evening. SBP parameters 150< met. Adequate UOP per external catheter. Pt updated on POC. Problem: NEUROLOGICAL - ADULT  Goal: Achieves stable or improved neurological status  Description: INTERVENTIONS  - Assess for and report changes in neurological status  - Initiate measures to prevent increased intracranial pressure  - Maintain blood pressure and fluid volume within ordered parameters to optimize cerebral perfusion and minimize risk of hemorrhage  - Monitor temperature, glucose, and sodium.  Initiate appropriate interventions as ordered  Outcome: Progressing  Goal: Remains free of injury related to seizure activity  Description: INTERVENTIONS:  - Maintain airway, patient safety  and administer oxygen as ordered  - Monitor patient for seizure activity, document and report duration and description of seizure to MD/LIP  - If seizure occurs, turn patient to side and suction secretions as needed  - Reorient patient post seizure  - Seizure pads on all 4 side rails  - Instruct patient/family to notify RN of any seizure activity  - Instruct patient/family to call for assistance with activity based on assessment  Outcome: Progressing  Goal: Achieves maximal functionality and self care  Description: INTERVENTIONS  - Monitor swallowing and airway patency with patient fatigue and changes in neurological status  - Encourage and assist patient to increase activity and self care with guidance from PT/OT  - Encourage visually impaired, hearing impaired and aphasic patients to use assistive/communication devices  Outcome: Progressing     Problem: PAIN - ADULT  Goal: Verbalizes/displays adequate comfort level or patient's stated pain goal  Description: INTERVENTIONS:  - Encourage pt to monitor pain and request assistance  - Assess pain using appropriate pain scale  - Administer analgesics based on type and severity of pain and evaluate response  - Implement non-pharmacological measures as appropriate and evaluate response  - Consider cultural and social influences on pain and pain management  - Manage/alleviate anxiety  - Utilize distraction and/or relaxation techniques  - Monitor for opioid side effects  - Notify MD/LIP if interventions unsuccessful or patient reports new pain  - Anticipate increased pain with activity and pre-medicate as appropriate  Outcome: Progressing

## 2023-02-02 NOTE — PLAN OF CARE
Assumed care of pt this am, she is sitting up in chair and c/p numbness to bilateral feet and some mild loss of sensation to left arm, she is otherwise neurologically intact. Headaches treated with pain medication as ordered. Lumbar drain draining 10cc/hr of clear fluid. Pt declines repositioning back to bed, wants to stay up in chair. See flow sheet for detailed assessment.

## 2023-02-03 ENCOUNTER — ANESTHESIA EVENT (OUTPATIENT)
Dept: SURGERY | Facility: HOSPITAL | Age: 38
End: 2023-02-03
Payer: MEDICAID

## 2023-02-03 ENCOUNTER — ANESTHESIA (OUTPATIENT)
Dept: SURGERY | Facility: HOSPITAL | Age: 38
End: 2023-02-03
Payer: MEDICAID

## 2023-02-03 LAB
ANION GAP SERPL CALC-SCNC: 7 MMOL/L (ref 0–18)
BUN BLD-MCNC: 21 MG/DL (ref 7–18)
CALCIUM BLD-MCNC: 8.9 MG/DL (ref 8.5–10.1)
CHLORIDE SERPL-SCNC: 109 MMOL/L (ref 98–112)
CO2 SERPL-SCNC: 20 MMOL/L (ref 21–32)
CREAT BLD-MCNC: 0.99 MG/DL
ERYTHROCYTE [DISTWIDTH] IN BLOOD BY AUTOMATED COUNT: 15.3 %
GFR SERPLBLD BASED ON 1.73 SQ M-ARVRAT: 76 ML/MIN/1.73M2 (ref 60–?)
GLUCOSE BLD-MCNC: 121 MG/DL (ref 70–99)
HCG UR QL: NEGATIVE
HCT VFR BLD AUTO: 35 %
HGB BLD-MCNC: 10.8 G/DL
MCH RBC QN AUTO: 23.9 PG (ref 26–34)
MCHC RBC AUTO-ENTMCNC: 30.9 G/DL (ref 31–37)
MCV RBC AUTO: 77.6 FL
OSMOLALITY SERPL CALC.SUM OF ELEC: 286 MOSM/KG (ref 275–295)
PLATELET # BLD AUTO: 378 10(3)UL (ref 150–450)
POTASSIUM SERPL-SCNC: 3.7 MMOL/L (ref 3.5–5.1)
POTASSIUM SERPL-SCNC: 3.7 MMOL/L (ref 3.5–5.1)
RBC # BLD AUTO: 4.51 X10(6)UL
SODIUM SERPL-SCNC: 136 MMOL/L (ref 136–145)
WBC # BLD AUTO: 5.1 X10(3) UL (ref 4–11)

## 2023-02-03 PROCEDURE — 99291 CRITICAL CARE FIRST HOUR: CPT | Performed by: OTHER

## 2023-02-03 PROCEDURE — 0JB80ZZ EXCISION OF ABDOMEN SUBCUTANEOUS TISSUE AND FASCIA, OPEN APPROACH: ICD-10-PCS | Performed by: OTOLARYNGOLOGY

## 2023-02-03 PROCEDURE — 8E093EZ FLUORESCENCE GUIDED PROCEDURE OF HEAD AND NECK REGION, PERCUTANEOUS APPROACH: ICD-10-PCS | Performed by: NEUROLOGICAL SURGERY

## 2023-02-03 PROCEDURE — 09UX47Z SUPPLEMENT LEFT SPHENOID SINUS WITH AUTOLOGOUS TISSUE SUBSTITUTE, PERCUTANEOUS ENDOSCOPIC APPROACH: ICD-10-PCS | Performed by: OTOLARYNGOLOGY

## 2023-02-03 PROCEDURE — 99232 SBSQ HOSP IP/OBS MODERATE 35: CPT | Performed by: HOSPITALIST

## 2023-02-03 DEVICE — DURAL SEALANT EXT TIP: Type: IMPLANTABLE DEVICE | Site: NOSE | Status: FUNCTIONAL

## 2023-02-03 RX ORDER — POLYETHYLENE GLYCOL 3350 17 G/17G
17 POWDER, FOR SOLUTION ORAL DAILY PRN
Status: DISCONTINUED | OUTPATIENT
Start: 2023-02-03 | End: 2023-02-13

## 2023-02-03 RX ORDER — HYDROMORPHONE HYDROCHLORIDE 1 MG/ML
0.2 INJECTION, SOLUTION INTRAMUSCULAR; INTRAVENOUS; SUBCUTANEOUS EVERY 5 MIN PRN
Status: ACTIVE | OUTPATIENT
Start: 2023-02-03 | End: 2023-02-03

## 2023-02-03 RX ORDER — ONDANSETRON 2 MG/ML
4 INJECTION INTRAMUSCULAR; INTRAVENOUS EVERY 6 HOURS PRN
Status: DISCONTINUED | OUTPATIENT
Start: 2023-02-03 | End: 2023-02-13 | Stop reason: ALTCHOICE

## 2023-02-03 RX ORDER — MIDAZOLAM HYDROCHLORIDE 1 MG/ML
INJECTION INTRAMUSCULAR; INTRAVENOUS AS NEEDED
Status: DISCONTINUED | OUTPATIENT
Start: 2023-02-03 | End: 2023-02-03 | Stop reason: SURG

## 2023-02-03 RX ORDER — KETAMINE HYDROCHLORIDE 50 MG/ML
INJECTION, SOLUTION, CONCENTRATE INTRAMUSCULAR; INTRAVENOUS AS NEEDED
Status: DISCONTINUED | OUTPATIENT
Start: 2023-02-03 | End: 2023-02-03 | Stop reason: SURG

## 2023-02-03 RX ORDER — SODIUM PHOSPHATE, DIBASIC AND SODIUM PHOSPHATE, MONOBASIC 7; 19 G/133ML; G/133ML
1 ENEMA RECTAL ONCE AS NEEDED
Status: COMPLETED | OUTPATIENT
Start: 2023-02-03 | End: 2023-02-06

## 2023-02-03 RX ORDER — ACETAMINOPHEN 500 MG
1000 TABLET ORAL ONCE AS NEEDED
Status: COMPLETED | OUTPATIENT
Start: 2023-02-03 | End: 2023-02-03

## 2023-02-03 RX ORDER — NALOXONE HYDROCHLORIDE 0.4 MG/ML
80 INJECTION, SOLUTION INTRAMUSCULAR; INTRAVENOUS; SUBCUTANEOUS AS NEEDED
Status: ACTIVE | OUTPATIENT
Start: 2023-02-03 | End: 2023-02-03

## 2023-02-03 RX ORDER — LIDOCAINE HYDROCHLORIDE AND EPINEPHRINE 10; 10 MG/ML; UG/ML
INJECTION, SOLUTION INFILTRATION; PERINEURAL AS NEEDED
Status: DISCONTINUED | OUTPATIENT
Start: 2023-02-03 | End: 2023-02-03 | Stop reason: HOSPADM

## 2023-02-03 RX ORDER — HYDROCODONE BITARTRATE AND ACETAMINOPHEN 5; 325 MG/1; MG/1
2 TABLET ORAL ONCE AS NEEDED
Status: COMPLETED | OUTPATIENT
Start: 2023-02-03 | End: 2023-02-03

## 2023-02-03 RX ORDER — BISACODYL 10 MG
10 SUPPOSITORY, RECTAL RECTAL
Status: DISCONTINUED | OUTPATIENT
Start: 2023-02-03 | End: 2023-02-13

## 2023-02-03 RX ORDER — METOPROLOL TARTRATE 5 MG/5ML
2.5 INJECTION INTRAVENOUS ONCE
Status: DISCONTINUED | OUTPATIENT
Start: 2023-02-03 | End: 2023-02-13 | Stop reason: ALTCHOICE

## 2023-02-03 RX ORDER — POTASSIUM CHLORIDE 20 MEQ/1
40 TABLET, EXTENDED RELEASE ORAL ONCE
Status: COMPLETED | OUTPATIENT
Start: 2023-02-03 | End: 2023-02-03

## 2023-02-03 RX ORDER — SODIUM CHLORIDE 9 MG/ML
INJECTION, SOLUTION INTRAVENOUS CONTINUOUS
Status: DISCONTINUED | OUTPATIENT
Start: 2023-02-03 | End: 2023-02-04

## 2023-02-03 RX ORDER — LIDOCAINE HYDROCHLORIDE 40 MG/ML
SOLUTION TOPICAL AS NEEDED
Status: DISCONTINUED | OUTPATIENT
Start: 2023-02-03 | End: 2023-02-03 | Stop reason: SURG

## 2023-02-03 RX ORDER — SENNOSIDES 8.6 MG
17.2 TABLET ORAL NIGHTLY PRN
Status: DISCONTINUED | OUTPATIENT
Start: 2023-02-03 | End: 2023-02-13

## 2023-02-03 RX ORDER — HYDROMORPHONE HYDROCHLORIDE 1 MG/ML
0.6 INJECTION, SOLUTION INTRAMUSCULAR; INTRAVENOUS; SUBCUTANEOUS EVERY 5 MIN PRN
Status: ACTIVE | OUTPATIENT
Start: 2023-02-03 | End: 2023-02-03

## 2023-02-03 RX ORDER — MIDAZOLAM HYDROCHLORIDE 1 MG/ML
1 INJECTION INTRAMUSCULAR; INTRAVENOUS EVERY 5 MIN PRN
Status: ACTIVE | OUTPATIENT
Start: 2023-02-03 | End: 2023-02-03

## 2023-02-03 RX ORDER — PROCHLORPERAZINE EDISYLATE 5 MG/ML
5 INJECTION INTRAMUSCULAR; INTRAVENOUS EVERY 8 HOURS PRN
Status: DISCONTINUED | OUTPATIENT
Start: 2023-02-03 | End: 2023-02-13 | Stop reason: ALTCHOICE

## 2023-02-03 RX ORDER — SODIUM CHLORIDE, SODIUM LACTATE, POTASSIUM CHLORIDE, CALCIUM CHLORIDE 600; 310; 30; 20 MG/100ML; MG/100ML; MG/100ML; MG/100ML
INJECTION, SOLUTION INTRAVENOUS CONTINUOUS
Status: DISCONTINUED | OUTPATIENT
Start: 2023-02-03 | End: 2023-02-03

## 2023-02-03 RX ORDER — HYDROCODONE BITARTRATE AND ACETAMINOPHEN 5; 325 MG/1; MG/1
1 TABLET ORAL ONCE AS NEEDED
Status: COMPLETED | OUTPATIENT
Start: 2023-02-03 | End: 2023-02-03

## 2023-02-03 RX ORDER — DEXAMETHASONE SODIUM PHOSPHATE 4 MG/ML
VIAL (ML) INJECTION AS NEEDED
Status: DISCONTINUED | OUTPATIENT
Start: 2023-02-03 | End: 2023-02-03 | Stop reason: SURG

## 2023-02-03 RX ORDER — LABETALOL HYDROCHLORIDE 5 MG/ML
INJECTION, SOLUTION INTRAVENOUS AS NEEDED
Status: DISCONTINUED | OUTPATIENT
Start: 2023-02-03 | End: 2023-02-03 | Stop reason: SURG

## 2023-02-03 RX ORDER — ROCURONIUM BROMIDE 10 MG/ML
INJECTION, SOLUTION INTRAVENOUS AS NEEDED
Status: DISCONTINUED | OUTPATIENT
Start: 2023-02-03 | End: 2023-02-03 | Stop reason: SURG

## 2023-02-03 RX ORDER — ONDANSETRON 2 MG/ML
INJECTION INTRAMUSCULAR; INTRAVENOUS AS NEEDED
Status: DISCONTINUED | OUTPATIENT
Start: 2023-02-03 | End: 2023-02-03 | Stop reason: SURG

## 2023-02-03 RX ORDER — HYDROMORPHONE HYDROCHLORIDE 1 MG/ML
0.4 INJECTION, SOLUTION INTRAMUSCULAR; INTRAVENOUS; SUBCUTANEOUS EVERY 5 MIN PRN
Status: ACTIVE | OUTPATIENT
Start: 2023-02-03 | End: 2023-02-03

## 2023-02-03 RX ADMIN — ROCURONIUM BROMIDE 50 MG: 10 INJECTION, SOLUTION INTRAVENOUS at 10:56:00

## 2023-02-03 RX ADMIN — KETAMINE HYDROCHLORIDE 20 MG: 50 INJECTION, SOLUTION, CONCENTRATE INTRAMUSCULAR; INTRAVENOUS at 10:49:00

## 2023-02-03 RX ADMIN — LABETALOL HYDROCHLORIDE 10 MG: 5 INJECTION, SOLUTION INTRAVENOUS at 12:01:00

## 2023-02-03 RX ADMIN — LABETALOL HYDROCHLORIDE 5 MG: 5 INJECTION, SOLUTION INTRAVENOUS at 11:50:00

## 2023-02-03 RX ADMIN — LIDOCAINE HYDROCHLORIDE 3 ML: 40 SOLUTION TOPICAL at 10:49:00

## 2023-02-03 RX ADMIN — MIDAZOLAM HYDROCHLORIDE 2 MG: 1 INJECTION INTRAMUSCULAR; INTRAVENOUS at 10:29:00

## 2023-02-03 RX ADMIN — LABETALOL HYDROCHLORIDE 10 MG: 5 INJECTION, SOLUTION INTRAVENOUS at 11:21:00

## 2023-02-03 RX ADMIN — ROCURONIUM BROMIDE 10 MG: 10 INJECTION, SOLUTION INTRAVENOUS at 12:01:00

## 2023-02-03 RX ADMIN — ROCURONIUM BROMIDE 20 MG: 10 INJECTION, SOLUTION INTRAVENOUS at 11:11:00

## 2023-02-03 RX ADMIN — ONDANSETRON 4 MG: 2 INJECTION INTRAMUSCULAR; INTRAVENOUS at 11:11:00

## 2023-02-03 RX ADMIN — ROCURONIUM BROMIDE 10 MG: 10 INJECTION, SOLUTION INTRAVENOUS at 12:06:00

## 2023-02-03 RX ADMIN — LABETALOL HYDROCHLORIDE 10 MG: 5 INJECTION, SOLUTION INTRAVENOUS at 11:25:00

## 2023-02-03 RX ADMIN — DEXAMETHASONE SODIUM PHOSPHATE 8 MG: 4 MG/ML VIAL (ML) INJECTION at 11:11:00

## 2023-02-03 RX ADMIN — LABETALOL HYDROCHLORIDE 10 MG: 5 INJECTION, SOLUTION INTRAVENOUS at 12:15:00

## 2023-02-03 NOTE — ANESTHESIA PROCEDURE NOTES
Airway  Date/Time: 2/3/2023 10:50 AM  Urgency: elective      General Information and Staff    Patient location during procedure: OR  Anesthesiologist: Jimena Noble MD  Performed: anesthesiologist     Indications and Patient Condition  Indications for airway management: anesthesia  Spontaneous Ventilation: absent  Sedation level: deep  Preoxygenated: yes  Patient position: sniffing  Mask difficulty assessment: 0 - not attempted    Final Airway Details  Final airway type: endotracheal airway      Successful airway: ETT  Cuffed: yes   Successful intubation technique: Video laryngoscopy  Endotracheal tube insertion site: oral  Blade: Joseph  Blade size: #3  ETT size (mm): 7.5    Cormack-Lehane Classification: grade I - full view of glottis  Placement verified by: chest auscultation and capnometry   Measured from: lips  ETT to lips (cm): 24  Number of attempts at approach: 1

## 2023-02-03 NOTE — PROGRESS NOTES
Patient re-assessed post-operatively. She is sleepy, but wakes to name and answers questions appropriately. Reports post-op pain, just received pain medication 15 minutes ago. She has no other complaints at this time. Lumbar drain intact, CSF fluorescent green from dye given todd-operatively. Plan to drain for 4 days. HOB 30 degrees. Joao Sharp.  Mateusz Turner [facility]  2/3/2023 2:41 PM  Spectra [id number]

## 2023-02-03 NOTE — PLAN OF CARE
Assumed care of pt @ 1930. Pt alert and oriented x4. Complain of 4-5/10 headache throughout night relieved with tylenol and norco. Q1 Neuro assessment intact. Pt denied any significant CSF leaking from nares. Lumbar drain clamped. NSR on Monitor. SBP within parameters. Adequate urine output from external catheter. NPO after midnight.

## 2023-02-03 NOTE — OPERATIVE REPORT
[facility]  Operative Report    Marixa Cook Location: OR   [facility] [id number] MRN [id number]   Admission Date 1/30/2023 Operation Date 2/3/2023   Attending Physician Sully Jones* Operating Physician Chetan Cannon MD     Pre-Operative Diagnosis: CSF leak    Post-Operative Diagnosis: Same as above    Procedure Performed: Procedure(s):  ENDOSCOPIC ENDONASAL CEREBROSPINAL FLUID LEAK REPAIR WITH ABDOMINAL FAT GRAFT    Co-Surgeon:  Renata    Anesthesia: General ET    EBL: 20    Specimen: None    Complications: None apparent    Findings: CSF leak from inferior lateral portion of sphenoid sinus    INDICATIONS:  The patient is a 30year old female with a history of a spontaneous CSF leak from the sphenoid sinus. Attempt was made at healing via lumbar drain and decrease ICP which was unsuccessful. Despite medical therapy, her symptoms have persisted and it was determined she may benefit from the above procedure. The risks, benefits, and alternatives of the procedure were discussed with the patient, including the risk of bleeding, infection, anesthesia, persistent or recurrent obstruction, septal perforation, and need for additional procedures. Informed consent was obtained. DESCRIPTION OF PROCEDURE: Patient was brought to the operating room. General anesthesia was induced with no complications. She was prepped and draped in a sterile fashion. Attention was directed to the left side. The inferior turbinate was outfractured laterally. The middle turbinate was lateralized as well. The inferior portion of the superior turbinates were trimmed with a microdebrider to allow visualization of the natural sphenoid os. This was widened with microdebrider and sharp cutting instruments. The leak was found to the be in the inferior portion of the sphenoid sinus. To allow for adequate access, the inferior face of the sphenoid was opened and the posterior septal artery was sacrificed.   The decision was made to take the middle turbinate to fashion a free overlay graft. The ethmoids were removed to open the sphenoid as inferiorly and laterally as possible. The remaining mucosa was removed from the sphenoid. This allowed for complete visualization of the leak which was confirmed on valsalva and was a pinpoint inferiorly. The graft was placed over the leak. A fat graft which was taken by Dr. Eneida Tobias (see his note) was placed over the graft and secured with adheris. A novapack dressing was then abutted against the fat. The drain was opened again and no leakage was noted when drain was open. All counts were correct at the end of the case and the patient was awakened and transferred to PACU in stable condition.       Darshana Craig MD  2/3/2023  12:55 PM

## 2023-02-03 NOTE — ANESTHESIA PROCEDURE NOTES
Peripheral IV  Date/Time: 2/3/2023 10:46 AM  Inserted by: Kacey Carcamo MD    Placement  Needle size: 20 G  Laterality: left  Location: forearm  Local anesthetic: none  Site prep: alcohol  Technique: ultrasound guided  Attempts: 1

## 2023-02-03 NOTE — PLAN OF CARE
Assumed care of pt at 0730. Pt doing well. Neuro signs intact except for some mild tingling and numbness in her toes and left lower arm that are unchanged since prior to admission. Lumbar drain open all day until 7pm when it was clamped. 10ml of clear CSF drained each hour. Pt has not had any CXF leaking from her nose during the time that the drain has been open. Plan is to keep drain clamped over night unless pt has a significant leak of CSF again from her nose then nurse will call neurosurgeon and reopen the drain. Decision to be made tomorrow morning if surgical intervention is needed. Pt NPO after MN in case surgical procedure needed.

## 2023-02-03 NOTE — PLAN OF CARE
Received pt at 0730. Pt alert/drowsy and oreinted x4, neurologically intact besides some baseline TED toes numbness. Pt on RA, lungs clear/diminished. Pt in NSR, SBP goal of less than 150 maintained. Pt with lumbar drain in place, clamped. Plan for pt to have endoscopic endonasal CSF leak repair. Consents signed. Pt down to OR. Received pt back around 1300. Pt lethargic but neuro status is unchanged. Lumbar drain now open, draining 10ml/hr, tolerating well. CSF is fluorescent yellow, as expected. HOB 30 degrees. Pt with abdominal incision, site with dressing in place, no drainage. Pt complains of throat and nose pain, managed by PRN pain medications and cold drinks/food. Pt updated on plan of care, will continue to monitor.

## 2023-02-04 LAB
ALBUMIN SERPL-MCNC: 3 G/DL (ref 3.4–5)
ALBUMIN/GLOB SERPL: 0.7 {RATIO} (ref 1–2)
ALP LIVER SERPL-CCNC: 92 U/L
ALT SERPL-CCNC: 22 U/L
ANION GAP SERPL CALC-SCNC: 8 MMOL/L (ref 0–18)
AST SERPL-CCNC: 14 U/L (ref 15–37)
BILIRUB SERPL-MCNC: 0.3 MG/DL (ref 0.1–2)
BUN BLD-MCNC: 15 MG/DL (ref 7–18)
CALCIUM BLD-MCNC: 8.6 MG/DL (ref 8.5–10.1)
CHLORIDE SERPL-SCNC: 109 MMOL/L (ref 98–112)
CO2 SERPL-SCNC: 22 MMOL/L (ref 21–32)
CREAT BLD-MCNC: 0.9 MG/DL
ERYTHROCYTE [DISTWIDTH] IN BLOOD BY AUTOMATED COUNT: 15.2 %
GFR SERPLBLD BASED ON 1.73 SQ M-ARVRAT: 85 ML/MIN/1.73M2 (ref 60–?)
GLOBULIN PLAS-MCNC: 4.4 G/DL (ref 2.8–4.4)
GLUCOSE BLD-MCNC: 128 MG/DL (ref 70–99)
HCT VFR BLD AUTO: 33.8 %
HGB BLD-MCNC: 10.5 G/DL
MCH RBC QN AUTO: 23.7 PG (ref 26–34)
MCHC RBC AUTO-ENTMCNC: 31.1 G/DL (ref 31–37)
MCV RBC AUTO: 76.3 FL
OSMOLALITY SERPL CALC.SUM OF ELEC: 290 MOSM/KG (ref 275–295)
PLATELET # BLD AUTO: 401 10(3)UL (ref 150–450)
POTASSIUM SERPL-SCNC: 3.8 MMOL/L (ref 3.5–5.1)
POTASSIUM SERPL-SCNC: 3.8 MMOL/L (ref 3.5–5.1)
PROT SERPL-MCNC: 7.4 G/DL (ref 6.4–8.2)
RBC # BLD AUTO: 4.43 X10(6)UL
SODIUM SERPL-SCNC: 139 MMOL/L (ref 136–145)
WBC # BLD AUTO: 8.3 X10(3) UL (ref 4–11)

## 2023-02-04 PROCEDURE — 99232 SBSQ HOSP IP/OBS MODERATE 35: CPT | Performed by: HOSPITALIST

## 2023-02-04 PROCEDURE — 99291 CRITICAL CARE FIRST HOUR: CPT | Performed by: OTHER

## 2023-02-04 RX ORDER — POTASSIUM CHLORIDE 20 MEQ/1
40 TABLET, EXTENDED RELEASE ORAL ONCE
Status: COMPLETED | OUTPATIENT
Start: 2023-02-04 | End: 2023-02-04

## 2023-02-04 RX ORDER — SENNOSIDES 8.6 MG
17.2 TABLET ORAL 2 TIMES DAILY
Status: DISCONTINUED | OUTPATIENT
Start: 2023-02-04 | End: 2023-02-13

## 2023-02-04 NOTE — PLAN OF CARE
Pt alert and oriented x4. Q1 Neuro intact baseline numbness and tingling in bilateral toes. NSR/ST on monitor. SBP maintained less than 150. Lumbar drain draining 10ml/hr of CSF. CSF is clear yellow/green in appearance. Abdominal site clean dry intact. Purewick.

## 2023-02-04 NOTE — PLAN OF CARE
A/Ox4, photosensitivity improved throughout shift, headache relieved with Norco. Mid L arm decreased sensation and n/t bilat toes, states is unchanged since prior to procedure. Nasal congestion, no outward drainage, states mild postnasal drip but denies metallic/salty taste, states does not feel thin as it did prior to procedure. Lumbar drain site covered, WDL, draining 10ml/hr as ordered, fluid bright yellow/green but clearing. R lower abd dressing WDL. Reviewed HOB 30, bedrest, no straws, no nose blowing, no holding sneeze, pt verbalized understanding. No BM since admit, denies discomfort, Senna and Miralax given. Voiding well, good appetite, no nausea. Problem: NEUROLOGICAL - ADULT  Goal: Achieves stable or improved neurological status  Description: INTERVENTIONS  - Assess for and report changes in neurological status  - Initiate measures to prevent increased intracranial pressure  - Maintain blood pressure and fluid volume within ordered parameters to optimize cerebral perfusion and minimize risk of hemorrhage  - Monitor temperature, glucose, and sodium.  Initiate appropriate interventions as ordered  Outcome: Progressing  Goal: Remains free of injury related to seizure activity  Description: INTERVENTIONS:  - Maintain airway, patient safety  and administer oxygen as ordered  - Monitor patient for seizure activity, document and report duration and description of seizure to MD/LIP  - If seizure occurs, turn patient to side and suction secretions as needed  - Reorient patient post seizure  - Seizure pads on all 4 side rails  - Instruct patient/family to notify RN of any seizure activity  - Instruct patient/family to call for assistance with activity based on assessment  Outcome: Progressing  Goal: Achieves maximal functionality and self care  Description: INTERVENTIONS  - Monitor swallowing and airway patency with patient fatigue and changes in neurological status  - Encourage and assist patient to increase activity and self care with guidance from PT/OT  - Encourage visually impaired, hearing impaired and aphasic patients to use assistive/communication devices  Outcome: Progressing     Problem: PAIN - ADULT  Goal: Verbalizes/displays adequate comfort level or patient's stated pain goal  Description: INTERVENTIONS:  - Encourage pt to monitor pain and request assistance  - Assess pain using appropriate pain scale  - Administer analgesics based on type and severity of pain and evaluate response  - Implement non-pharmacological measures as appropriate and evaluate response  - Consider cultural and social influences on pain and pain management  - Manage/alleviate anxiety  - Utilize distraction and/or relaxation techniques  - Monitor for opioid side effects  - Notify MD/LIP if interventions unsuccessful or patient reports new pain  - Anticipate increased pain with activity and pre-medicate as appropriate  Outcome: Progressing

## 2023-02-05 LAB
ALBUMIN SERPL-MCNC: 2.9 G/DL (ref 3.4–5)
ALBUMIN/GLOB SERPL: 0.7 {RATIO} (ref 1–2)
ALP LIVER SERPL-CCNC: 81 U/L
ALT SERPL-CCNC: 22 U/L
ANION GAP SERPL CALC-SCNC: 4 MMOL/L (ref 0–18)
AST SERPL-CCNC: 9 U/L (ref 15–37)
BASOPHILS # BLD AUTO: 0.04 X10(3) UL (ref 0–0.2)
BASOPHILS NFR BLD AUTO: 0.5 %
BILIRUB SERPL-MCNC: 0.2 MG/DL (ref 0.1–2)
BUN BLD-MCNC: 16 MG/DL (ref 7–18)
CALCIUM BLD-MCNC: 8.6 MG/DL (ref 8.5–10.1)
CHLORIDE SERPL-SCNC: 111 MMOL/L (ref 98–112)
CO2 SERPL-SCNC: 23 MMOL/L (ref 21–32)
CREAT BLD-MCNC: 0.76 MG/DL
EOSINOPHIL # BLD AUTO: 0.01 X10(3) UL (ref 0–0.7)
EOSINOPHIL NFR BLD AUTO: 0.1 %
ERYTHROCYTE [DISTWIDTH] IN BLOOD BY AUTOMATED COUNT: 15.6 %
GFR SERPLBLD BASED ON 1.73 SQ M-ARVRAT: 105 ML/MIN/1.73M2 (ref 60–?)
GLOBULIN PLAS-MCNC: 4.3 G/DL (ref 2.8–4.4)
GLUCOSE BLD-MCNC: 97 MG/DL (ref 70–99)
HCT VFR BLD AUTO: 35.6 %
HGB BLD-MCNC: 10.3 G/DL
IMM GRANULOCYTES # BLD AUTO: 0.02 X10(3) UL (ref 0–1)
IMM GRANULOCYTES NFR BLD: 0.3 %
LYMPHOCYTES # BLD AUTO: 2.08 X10(3) UL (ref 1–4)
LYMPHOCYTES NFR BLD AUTO: 28.4 %
MCH RBC QN AUTO: 23.1 PG (ref 26–34)
MCHC RBC AUTO-ENTMCNC: 28.9 G/DL (ref 31–37)
MCV RBC AUTO: 79.8 FL
MONOCYTES # BLD AUTO: 0.61 X10(3) UL (ref 0.1–1)
MONOCYTES NFR BLD AUTO: 8.3 %
NEUTROPHILS # BLD AUTO: 4.57 X10 (3) UL (ref 1.5–7.7)
NEUTROPHILS # BLD AUTO: 4.57 X10(3) UL (ref 1.5–7.7)
NEUTROPHILS NFR BLD AUTO: 62.4 %
OSMOLALITY SERPL CALC.SUM OF ELEC: 287 MOSM/KG (ref 275–295)
PLATELET # BLD AUTO: 392 10(3)UL (ref 150–450)
POTASSIUM SERPL-SCNC: 3.9 MMOL/L (ref 3.5–5.1)
POTASSIUM SERPL-SCNC: 3.9 MMOL/L (ref 3.5–5.1)
PROT SERPL-MCNC: 7.2 G/DL (ref 6.4–8.2)
RBC # BLD AUTO: 4.46 X10(6)UL
SODIUM SERPL-SCNC: 138 MMOL/L (ref 136–145)
WBC # BLD AUTO: 7.3 X10(3) UL (ref 4–11)

## 2023-02-05 PROCEDURE — 99232 SBSQ HOSP IP/OBS MODERATE 35: CPT | Performed by: HOSPITALIST

## 2023-02-05 NOTE — PLAN OF CARE
Patient care assumed 1930, in bed, lumbar drain in place, draining 10cc/hr, color is currently clear with yellowish/green tint, improved from earlier today. Neuros Q1, intact except for pre-existing numbness to L forearm and underside of all toes bilaterally. NSR, Normotensive, patient has quite congested sinuses, including Novapak, and reports small amounts of periodic drainage down throat which is lesser than before surgery and otherwise unchanged.  PERRLA, decent strength everywhere, generalized low effort exam.

## 2023-02-06 ENCOUNTER — ANESTHESIA EVENT (OUTPATIENT)
Dept: SURGERY | Facility: HOSPITAL | Age: 38
End: 2023-02-06
Payer: MEDICAID

## 2023-02-06 LAB
ALBUMIN SERPL-MCNC: 3 G/DL (ref 3.4–5)
ALBUMIN/GLOB SERPL: 0.7 {RATIO} (ref 1–2)
ALP LIVER SERPL-CCNC: 93 U/L
ALT SERPL-CCNC: 21 U/L
ANION GAP SERPL CALC-SCNC: 4 MMOL/L (ref 0–18)
AST SERPL-CCNC: 9 U/L (ref 15–37)
BASOPHILS # BLD AUTO: 0.05 X10(3) UL (ref 0–0.2)
BASOPHILS NFR BLD AUTO: 0.8 %
BILIRUB SERPL-MCNC: 0.2 MG/DL (ref 0.1–2)
BUN BLD-MCNC: 16 MG/DL (ref 7–18)
CALCIUM BLD-MCNC: 8.7 MG/DL (ref 8.5–10.1)
CHLORIDE SERPL-SCNC: 108 MMOL/L (ref 98–112)
CO2 SERPL-SCNC: 24 MMOL/L (ref 21–32)
CREAT BLD-MCNC: 0.82 MG/DL
EOSINOPHIL # BLD AUTO: 0.03 X10(3) UL (ref 0–0.7)
EOSINOPHIL NFR BLD AUTO: 0.5 %
ERYTHROCYTE [DISTWIDTH] IN BLOOD BY AUTOMATED COUNT: 15.7 %
GFR SERPLBLD BASED ON 1.73 SQ M-ARVRAT: 96 ML/MIN/1.73M2 (ref 60–?)
GLOBULIN PLAS-MCNC: 4.5 G/DL (ref 2.8–4.4)
GLUCOSE BLD-MCNC: 100 MG/DL (ref 70–99)
HCT VFR BLD AUTO: 36.6 %
HGB BLD-MCNC: 10.7 G/DL
IMM GRANULOCYTES # BLD AUTO: 0.02 X10(3) UL (ref 0–1)
IMM GRANULOCYTES NFR BLD: 0.3 %
LYMPHOCYTES # BLD AUTO: 1.73 X10(3) UL (ref 1–4)
LYMPHOCYTES NFR BLD AUTO: 27.7 %
MCH RBC QN AUTO: 23.2 PG (ref 26–34)
MCHC RBC AUTO-ENTMCNC: 29.2 G/DL (ref 31–37)
MCV RBC AUTO: 79.4 FL
MONOCYTES # BLD AUTO: 0.67 X10(3) UL (ref 0.1–1)
MONOCYTES NFR BLD AUTO: 10.7 %
NEUTROPHILS # BLD AUTO: 3.75 X10 (3) UL (ref 1.5–7.7)
NEUTROPHILS # BLD AUTO: 3.75 X10(3) UL (ref 1.5–7.7)
NEUTROPHILS NFR BLD AUTO: 60 %
OSMOLALITY SERPL CALC.SUM OF ELEC: 283 MOSM/KG (ref 275–295)
PLATELET # BLD AUTO: 377 10(3)UL (ref 150–450)
POTASSIUM SERPL-SCNC: 3.7 MMOL/L (ref 3.5–5.1)
PROT SERPL-MCNC: 7.5 G/DL (ref 6.4–8.2)
RBC # BLD AUTO: 4.61 X10(6)UL
SODIUM SERPL-SCNC: 136 MMOL/L (ref 136–145)
WBC # BLD AUTO: 6.3 X10(3) UL (ref 4–11)

## 2023-02-06 PROCEDURE — 99232 SBSQ HOSP IP/OBS MODERATE 35: CPT | Performed by: HOSPITALIST

## 2023-02-06 RX ORDER — HEPARIN SODIUM 5000 [USP'U]/ML
5000 INJECTION, SOLUTION INTRAVENOUS; SUBCUTANEOUS EVERY 8 HOURS SCHEDULED
Status: COMPLETED | OUTPATIENT
Start: 2023-02-06 | End: 2023-02-09

## 2023-02-06 RX ORDER — MORPHINE SULFATE 2 MG/ML
2 INJECTION, SOLUTION INTRAMUSCULAR; INTRAVENOUS EVERY 2 HOUR PRN
Status: DISCONTINUED | OUTPATIENT
Start: 2023-02-06 | End: 2023-02-08

## 2023-02-06 RX ORDER — LACTULOSE 10 G/15ML
30 SOLUTION ORAL ONCE
Status: DISCONTINUED | OUTPATIENT
Start: 2023-02-06 | End: 2023-02-06

## 2023-02-06 RX ORDER — MORPHINE SULFATE 2 MG/ML
0.5 INJECTION, SOLUTION INTRAMUSCULAR; INTRAVENOUS EVERY 2 HOUR PRN
Status: DISCONTINUED | OUTPATIENT
Start: 2023-02-06 | End: 2023-02-08

## 2023-02-06 RX ORDER — MORPHINE SULFATE 2 MG/ML
1 INJECTION, SOLUTION INTRAMUSCULAR; INTRAVENOUS EVERY 2 HOUR PRN
Status: DISCONTINUED | OUTPATIENT
Start: 2023-02-06 | End: 2023-02-08

## 2023-02-06 NOTE — PLAN OF CARE
Assumed care of patient about 0730. Q2 neuros during the day charted in flowsheets with main deficits being decreased sensation BUE left more than right and numbness in bilateral toes. Neurosurgery notified of patient having metallic taste in mouth this afternoon with no new orders. Lumbar drain with small amount of old drainage on dressing. Clear fluid drained 10cc/hr per order. SBP less than 150. Patient nauseous most of the day so poor appetite and unable to get down most of her PO meds. Patient is also constipated. Suppository given with no results. Fleet enema given with moderate bm to follow. Waiting to see if it worked. Patient is passing fazal. Good UO per joel. Abd dressing C,D,I. Nasal dressing nelli but no CSF drainage noted through nares. Morphine given for pain. BR continued with HOB at 30 degrees. Plan for  shunt on Friday. POC discussed with patient and life partner at bedside. Problem: NEUROLOGICAL - ADULT  Goal: Achieves stable or improved neurological status  Description: INTERVENTIONS  - Assess for and report changes in neurological status  - Initiate measures to prevent increased intracranial pressure  - Maintain blood pressure and fluid volume within ordered parameters to optimize cerebral perfusion and minimize risk of hemorrhage  - Monitor temperature, glucose, and sodium.  Initiate appropriate interventions as ordered  Outcome: Progressing     Problem: PAIN - ADULT  Goal: Verbalizes/displays adequate comfort level or patient's stated pain goal  Description: INTERVENTIONS:  - Encourage pt to monitor pain and request assistance  - Assess pain using appropriate pain scale  - Administer analgesics based on type and severity of pain and evaluate response  - Implement non-pharmacological measures as appropriate and evaluate response  - Consider cultural and social influences on pain and pain management  - Manage/alleviate anxiety  - Utilize distraction and/or relaxation techniques  - Monitor for opioid side effects  - Notify MD/LIP if interventions unsuccessful or patient reports new pain  - Anticipate increased pain with activity and pre-medicate as appropriate  Outcome: Progressing

## 2023-02-06 NOTE — VASCULAR ACCESS
Consulted to place Difficult IV. 2 attempts- unable to thread. Recommend RN ask MD for a central line.

## 2023-02-06 NOTE — PLAN OF CARE
Patient care assumed 1930, in bed, lumbar drain in place, draining 10cc/hr, color is currently clear. Neuros Q2/3, intact except for pre-existing numbness to L forearm and underside of all toes bilaterally. NSR, Normotensive, patient has quite congested sinuses, including Novapak, and reports no nasal drainage either outwardly or down throat. PERRL, decent strength everywhere, generalized low effort exam. Lumbar drain bag changed using second RN and sterile technique.

## 2023-02-06 NOTE — SLP NOTE
Patient with nausea and not wanting to eat or drink at this time. Will attempt meal monitor on 2/7 to assess tolerance of recommended diet.

## 2023-02-06 NOTE — OPERATIVE REPORT
MRN:  [id number]  Patient Name:  Soel Sesay  YOB: 1985     DATE OF OPERATION: 2/3/23     PREOPERATIVE DIAGNOSES: CSF leak     POSTOPERATIVE DIAGNOSES: CSF leak     PROCEDURE PERFORMED:  1. Intrathecal injection of fluorescein through lumbar drain  2. Abdominal fat graft harvest     SURGEON:  Nabil Mitchell. Drea Figueroa MD     CO-SURGEON:  Ethel Larose MD     ANESTHESIA:  General endotracheal.     ESTIMATED BLOOD LOSS:  20 mL     INDICATIONS FOR PROCEDURE:  Please see the hospital notes for further information. This is a 42year old female with six weeks of spontaneous CSF rhinorrhea who had been admitted to the hospital from clinic and lumbar drain was placed and we had been draining CSF for the past four days prior to the drain being clamped and she had recurrent rhinorrhea. Risks and benefits of this operation were reviewed with the patient, and she has elected to proceed. DESCRIPTION OF PROCEDURE:  The patient was then brought back to the operating room. Timeouts were performed per protocol. General endotracheal anesthesia was induced. Lines and monitors were started by the anesthesiologist. Antibiotics were administered. In a sterile fashion, 25mg of fluorescein was mixed with 10ml of preservative free saline. 10ml of CSF was allowed to drain from the lumbar drain. The fluorescein solution was then slowly infused into the lumbar drain and then flushed with 5ml of saline. The lumbar drain was then clamped. Dr. Robert Alvarez performed the endoscopic endonasal exposure and repair of the CSF leak, which he will dictate. I harvested the abdominal fat graft during his endoscopic repair. The skin had been sterile prepped and draped. A 2 inch transverse incision lateral to the umbilicus was made. Hemostasis was achieved. An adequate amount of fat was dissected and removed for use in the CSF leak repair.   The wound was irrigated copiously, hemostasis was confirmed, and the wound was closed in layers with 2-0 Vicryls and a 5-0 fat gut. Sterile dressings were then applied. DISPOSITION:  Intensive Care Unit     CONDITION:  Stable, extubated and neurologically at baseline. COUNTS:  All needle, sponge and cottonoid counts were reported correct at the end of the operation. WOUND CLASSIFICATION:  1, clean.

## 2023-02-06 NOTE — PLAN OF CARE
VSS, Neuro checks stable, Lumbar drain c clear drainage, 10 mLs emptied/hr. Pt c/o H/A, managed c PRN meds. Abd dressing C/D/I. Pt c/o constipation, given PRN meds/prune juice, no results yet. Also, had episode of N/V this afternoon. Good UOP, fair appetite. No other issues at this time, will continue to monitor.

## 2023-02-07 LAB
ANION GAP SERPL CALC-SCNC: 4 MMOL/L (ref 0–18)
BUN BLD-MCNC: 16 MG/DL (ref 7–18)
CALCIUM BLD-MCNC: 8.7 MG/DL (ref 8.5–10.1)
CHLORIDE SERPL-SCNC: 107 MMOL/L (ref 98–112)
CO2 SERPL-SCNC: 24 MMOL/L (ref 21–32)
CREAT BLD-MCNC: 0.97 MG/DL
ERYTHROCYTE [DISTWIDTH] IN BLOOD BY AUTOMATED COUNT: 15 %
GFR SERPLBLD BASED ON 1.73 SQ M-ARVRAT: 78 ML/MIN/1.73M2 (ref 60–?)
GLUCOSE BLD-MCNC: 112 MG/DL (ref 70–99)
HCT VFR BLD AUTO: 34.9 %
HGB BLD-MCNC: 10.6 G/DL
MCH RBC QN AUTO: 23.5 PG (ref 26–34)
MCHC RBC AUTO-ENTMCNC: 30.4 G/DL (ref 31–37)
MCV RBC AUTO: 77.4 FL
OSMOLALITY SERPL CALC.SUM OF ELEC: 282 MOSM/KG (ref 275–295)
PLATELET # BLD AUTO: 366 10(3)UL (ref 150–450)
POTASSIUM SERPL-SCNC: 3.4 MMOL/L (ref 3.5–5.1)
RBC # BLD AUTO: 4.51 X10(6)UL
SODIUM SERPL-SCNC: 135 MMOL/L (ref 136–145)
WBC # BLD AUTO: 5.6 X10(3) UL (ref 4–11)

## 2023-02-07 PROCEDURE — 99232 SBSQ HOSP IP/OBS MODERATE 35: CPT | Performed by: HOSPITALIST

## 2023-02-07 RX ORDER — POTASSIUM CHLORIDE 20 MEQ/1
40 TABLET, EXTENDED RELEASE ORAL ONCE
Status: COMPLETED | OUTPATIENT
Start: 2023-02-07 | End: 2023-02-07

## 2023-02-07 NOTE — PLAN OF CARE
Assumed pt care at [address]. A&Ox4, lumbar drain in place, draining 10 ml/hr. photosensitivity, decreased sensation L forearm, numbness to BL toes. RA, lung sounds diminished, NSR on tele. Nonpitting edema to BL feet. Purewick in place, voiding yellow urine. On bedrest.  Incision to nose. RLQ incision, dressing CDI. Constipated. Bowel sounds hypoactive. C/o tenderness. Scheduled senna given. Pt given enema by previous shift. Passing gas. C/o nausea. PRN antiemetics given. POC updated with pt, questions answered, verbalized understanding. Will continue to monitor. 0600: Pt c/o double vision x 10-15 mins. Since resolved upon assessment. Endorsed to oncoming shift. Problem: NEUROLOGICAL - ADULT  Goal: Achieves stable or improved neurological status  Description: INTERVENTIONS  - Assess for and report changes in neurological status  - Initiate measures to prevent increased intracranial pressure  - Maintain blood pressure and fluid volume within ordered parameters to optimize cerebral perfusion and minimize risk of hemorrhage  - Monitor temperature, glucose, and sodium.  Initiate appropriate interventions as ordered  Outcome: Progressing  Goal: Remains free of injury related to seizure activity  Description: INTERVENTIONS:  - Maintain airway, patient safety  and administer oxygen as ordered  - Monitor patient for seizure activity, document and report duration and description of seizure to MD/LIP  - If seizure occurs, turn patient to side and suction secretions as needed  - Reorient patient post seizure  - Seizure pads on all 4 side rails  - Instruct patient/family to notify RN of any seizure activity  - Instruct patient/family to call for assistance with activity based on assessment  Outcome: Progressing  Goal: Achieves maximal functionality and self care  Description: INTERVENTIONS  - Monitor swallowing and airway patency with patient fatigue and changes in neurological status  - Encourage and assist patient to increase activity and self care with guidance from PT/OT  - Encourage visually impaired, hearing impaired and aphasic patients to use assistive/communication devices  Outcome: Progressing     Problem: PAIN - ADULT  Goal: Verbalizes/displays adequate comfort level or patient's stated pain goal  Description: INTERVENTIONS:  - Encourage pt to monitor pain and request assistance  - Assess pain using appropriate pain scale  - Administer analgesics based on type and severity of pain and evaluate response  - Implement non-pharmacological measures as appropriate and evaluate response  - Consider cultural and social influences on pain and pain management  - Manage/alleviate anxiety  - Utilize distraction and/or relaxation techniques  - Monitor for opioid side effects  - Notify MD/LIP if interventions unsuccessful or patient reports new pain  - Anticipate increased pain with activity and pre-medicate as appropriate  Outcome: Progressing     Problem: RISK FOR INFECTION - ADULT  Goal: Absence of fever/infection during anticipated neutropenic period  Description: INTERVENTIONS  - Monitor WBC  - Administer growth factors as ordered  - Implement neutropenic guidelines  Outcome: Progressing     Problem: SAFETY ADULT - FALL  Goal: Free from fall injury  Description: INTERVENTIONS:  - Assess pt frequently for physical needs  - Identify cognitive and physical deficits and behaviors that affect risk of falls.   - [place] fall precautions as indicated by assessment.  - Educate pt/family on patient safety including physical limitations  - Instruct pt to call for assistance with activity based on assessment  - Modify environment to reduce risk of injury  - Provide assistive devices as appropriate  - Consider OT/PT consult to assist with strengthening/mobility  - Encourage toileting schedule  Outcome: Progressing     Problem: DISCHARGE PLANNING  Goal: Discharge to home or other facility with appropriate resources  Description: INTERVENTIONS:  - Identify barriers to discharge w/pt and caregiver  - Include patient/family/discharge partner in discharge planning  - Arrange for needed discharge resources and transportation as appropriate  - Identify discharge learning needs (meds, wound care, etc)  - Arrange for interpreters to assist at discharge as needed  - Consider post-discharge preferences of patient/family/discharge partner  - Complete POLST form as appropriate  - Assess patient's ability to be responsible for managing their own health  - Refer to Case Management Department for coordinating discharge planning if the patient needs post-hospital services based on physician/LIP order or complex needs related to functional status, cognitive ability or social support system  Outcome: Progressing     Problem: Patient/Family Goals  Goal: Patient/Family Long Term Goal  Description: Patient's Long Term Goal: to not have CSF draining from nose    Interventions:   -  shunt  - See additional Care Plan goals for specific interventions  Outcome: Progressing  Goal: Patient/Family Short Term Goal  Description: Patient's Short Term Goal: decreased nausea    Interventions:   - antiemetics, have BM  - See additional Care Plan goals for specific interventions  Outcome: Progressing

## 2023-02-07 NOTE — SLP NOTE
SPEECH DAILY NOTE - INPATIENT    ASSESSMENT & PLAN   ASSESSMENT  Pt seen for dysphagia tx to assess tolerance with recommended diet, ensure proper utilization of aspiration precautions and provide pt/family education. Patient seen at bedside with items from recommended diet. Patient reports tolerating recommended diet and feeling much better with an appetite. Patient demonstrates safe and efficient po intake for recommended diet free of overt clinical s/s of aspiration. No further skilled swallowing intervention is warranted. Educated patient and her sister on recommendations of diet with use of compensatory strategies with understanding verbalized and agreed upon. If further needs arise during this inpatient stay, please re-consult speech/swallowing service. Diet Recommendations - Solids: Regular  Diet Recommendations - Liquids: Thin Liquids    Compensatory Strategies Recommended: No straws;Small bites and sips  Aspiration Precautions: Upright position; No straw (upright as approved by neurosurgery)  Medication Administration Recommendations: One pill at a time    Patient Experiencing Pain: No        Discharge Recommendations  Discharge Recommendations/Plan: Undetermined    Treatment Plan  Treatment Plan/Recommendations: No further inpatient SLP service warranted    Interdisciplinary Communication: Disussed with other staff          GOALS  Goal #1 The patient will tolerate regular consistency and thin liquids without overt signs or symptoms of aspiration with 95 % accuracy over 2-3 session(s). Met   Goal #2 The patient/family/caregiver will demonstrate understanding and implementation of aspiration precautions and swallow strategies independently over 2-3 session(s).      Met         FOLLOW UP  Follow Up Needed (Documentation Required): No  SLP Follow-up Date: 02/07/23  Number of Visits to Meet Established Goals: 3    Session: 2 of 3    If you have any questions, please contact MICHAEL Montero

## 2023-02-08 ENCOUNTER — APPOINTMENT (OUTPATIENT)
Dept: GENERAL RADIOLOGY | Facility: HOSPITAL | Age: 38
End: 2023-02-08
Attending: HOSPITALIST
Payer: MEDICAID

## 2023-02-08 LAB — POTASSIUM SERPL-SCNC: 3.6 MMOL/L (ref 3.5–5.1)

## 2023-02-08 PROCEDURE — 74019 RADEX ABDOMEN 2 VIEWS: CPT | Performed by: HOSPITALIST

## 2023-02-08 PROCEDURE — 99233 SBSQ HOSP IP/OBS HIGH 50: CPT | Performed by: HOSPITALIST

## 2023-02-08 RX ORDER — BUTALBITAL, ACETAMINOPHEN AND CAFFEINE 50; 325; 40 MG/1; MG/1; MG/1
1 TABLET ORAL EVERY 4 HOURS PRN
Status: DISCONTINUED | OUTPATIENT
Start: 2023-02-08 | End: 2023-02-13

## 2023-02-08 NOTE — PLAN OF CARE
Assumed pt care at 1. A&Ox4, Lumbar drain in place, draining 10 ml/hr. photosensitivity, decreased sensation L forearm, numbness to BL toes. RA, lung sounds diminished, NSR on tele. Nonpitting edema to BL feet. Purewick in place, voiding yellow urine. On bedrest.  Incision to nose. RLQ incision, dressing CDI. Constipated. Bowel sounds hypoactive. C/o tenderness. Scheduled senna given. Passing gas. C/o nausea. PRN antiemetics given.  shunt Friday. POC updated with pt, questions answered, verbalized understanding. Will continue to monitor. Problem: NEUROLOGICAL - ADULT  Goal: Achieves stable or improved neurological status  Description: INTERVENTIONS  - Assess for and report changes in neurological status  - Initiate measures to prevent increased intracranial pressure  - Maintain blood pressure and fluid volume within ordered parameters to optimize cerebral perfusion and minimize risk of hemorrhage  - Monitor temperature, glucose, and sodium.  Initiate appropriate interventions as ordered  Outcome: Progressing  Goal: Remains free of injury related to seizure activity  Description: INTERVENTIONS:  - Maintain airway, patient safety  and administer oxygen as ordered  - Monitor patient for seizure activity, document and report duration and description of seizure to MD/LIP  - If seizure occurs, turn patient to side and suction secretions as needed  - Reorient patient post seizure  - Seizure pads on all 4 side rails  - Instruct patient/family to notify RN of any seizure activity  - Instruct patient/family to call for assistance with activity based on assessment  Outcome: Progressing  Goal: Achieves maximal functionality and self care  Description: INTERVENTIONS  - Monitor swallowing and airway patency with patient fatigue and changes in neurological status  - Encourage and assist patient to increase activity and self care with guidance from PT/OT  - Encourage visually impaired, hearing impaired and aphasic patients to use assistive/communication devices  Outcome: Progressing     Problem: PAIN - ADULT  Goal: Verbalizes/displays adequate comfort level or patient's stated pain goal  Description: INTERVENTIONS:  - Encourage pt to monitor pain and request assistance  - Assess pain using appropriate pain scale  - Administer analgesics based on type and severity of pain and evaluate response  - Implement non-pharmacological measures as appropriate and evaluate response  - Consider cultural and social influences on pain and pain management  - Manage/alleviate anxiety  - Utilize distraction and/or relaxation techniques  - Monitor for opioid side effects  - Notify MD/LIP if interventions unsuccessful or patient reports new pain  - Anticipate increased pain with activity and pre-medicate as appropriate  Outcome: Progressing     Problem: RISK FOR INFECTION - ADULT  Goal: Absence of fever/infection during anticipated neutropenic period  Description: INTERVENTIONS  - Monitor WBC  - Administer growth factors as ordered  - Implement neutropenic guidelines  Outcome: Progressing     Problem: SAFETY ADULT - FALL  Goal: Free from fall injury  Description: INTERVENTIONS:  - Assess pt frequently for physical needs  - Identify cognitive and physical deficits and behaviors that affect risk of falls.   - [place] fall precautions as indicated by assessment.  - Educate pt/family on patient safety including physical limitations  - Instruct pt to call for assistance with activity based on assessment  - Modify environment to reduce risk of injury  - Provide assistive devices as appropriate  - Consider OT/PT consult to assist with strengthening/mobility  - Encourage toileting schedule  Outcome: Progressing     Problem: DISCHARGE PLANNING  Goal: Discharge to home or other facility with appropriate resources  Description: INTERVENTIONS:  - Identify barriers to discharge w/pt and caregiver  - Include patient/family/discharge partner in discharge planning  - Arrange for needed discharge resources and transportation as appropriate  - Identify discharge learning needs (meds, wound care, etc)  - Arrange for interpreters to assist at discharge as needed  - Consider post-discharge preferences of patient/family/discharge partner  - Complete POLST form as appropriate  - Assess patient's ability to be responsible for managing their own health  - Refer to Case Management Department for coordinating discharge planning if the patient needs post-hospital services based on physician/LIP order or complex needs related to functional status, cognitive ability or social support system  Outcome: Progressing     Problem: Patient/Family Goals  Goal: Patient/Family Long Term Goal  Description: Patient's Long Term Goal: to not have CSF draining from nose    Interventions:   -  shunt  - See additional Care Plan goals for specific interventions  Outcome: Progressing  Goal: Patient/Family Short Term Goal  Description: Patient's Short Term Goal: decreased nausea    Interventions:   - antiemetics, have BM  - See additional Care Plan goals for specific interventions  Outcome: Progressing

## 2023-02-08 NOTE — PLAN OF CARE
[facility] at 0730. A/Ox4, photosensivity, decreased sensation LUE and numbness in toes pt. Pt on room air. Lung sounds are diminished. Normal sinus rhythm. SBP < 150. Lumbar Drain in place site is clean, dry and intact. Draining 10ml/hr. Pain better controlled with Fiorcet. RLQ incision clean, dry and intact dressing changed. Pt with persistent nausea and constipation KUB done. See report. Senna and Miralax given. Pt repositioned for comfort. Will continue to monitor pt. Problem: NEUROLOGICAL - ADULT  Goal: Achieves stable or improved neurological status  Description: INTERVENTIONS  - Assess for and report changes in neurological status  - Initiate measures to prevent increased intracranial pressure  - Maintain blood pressure and fluid volume within ordered parameters to optimize cerebral perfusion and minimize risk of hemorrhage  - Monitor temperature, glucose, and sodium.  Initiate appropriate interventions as ordered  Outcome: Progressing  Goal: Remains free of injury related to seizure activity  Description: INTERVENTIONS:  - Maintain airway, patient safety  and administer oxygen as ordered  - Monitor patient for seizure activity, document and report duration and description of seizure to MD/LIP  - If seizure occurs, turn patient to side and suction secretions as needed  - Reorient patient post seizure  - Seizure pads on all 4 side rails  - Instruct patient/family to notify RN of any seizure activity  - Instruct patient/family to call for assistance with activity based on assessment  Outcome: Progressing  Goal: Achieves maximal functionality and self care  Description: INTERVENTIONS  - Monitor swallowing and airway patency with patient fatigue and changes in neurological status  - Encourage and assist patient to increase activity and self care with guidance from PT/OT  - Encourage visually impaired, hearing impaired and aphasic patients to use assistive/communication devices  Outcome: Progressing Problem: PAIN - ADULT  Goal: Verbalizes/displays adequate comfort level or patient's stated pain goal  Description: INTERVENTIONS:  - Encourage pt to monitor pain and request assistance  - Assess pain using appropriate pain scale  - Administer analgesics based on type and severity of pain and evaluate response  - Implement non-pharmacological measures as appropriate and evaluate response  - Consider cultural and social influences on pain and pain management  - Manage/alleviate anxiety  - Utilize distraction and/or relaxation techniques  - Monitor for opioid side effects  - Notify MD/LIP if interventions unsuccessful or patient reports new pain  - Anticipate increased pain with activity and pre-medicate as appropriate  Outcome: Progressing     Problem: RISK FOR INFECTION - ADULT  Goal: Absence of fever/infection during anticipated neutropenic period  Description: INTERVENTIONS  - Monitor WBC  - Administer growth factors as ordered  - Implement neutropenic guidelines  Outcome: Progressing     Problem: SAFETY ADULT - FALL  Goal: Free from fall injury  Description: INTERVENTIONS:  - Assess pt frequently for physical needs  - Identify cognitive and physical deficits and behaviors that affect risk of falls.   - [place] fall precautions as indicated by assessment.  - Educate pt/family on patient safety including physical limitations  - Instruct pt to call for assistance with activity based on assessment  - Modify environment to reduce risk of injury  - Provide assistive devices as appropriate  - Consider OT/PT consult to assist with strengthening/mobility  - Encourage toileting schedule  Outcome: Progressing

## 2023-02-08 NOTE — PLAN OF CARE
Assumed care of patient about 0730. Q2 neuros during the day charted in flowsheets with main deficits being decreased sensation in BUE, left worse than R and numbness in bilateral toes. Patient still has metallic taste in mouth but denying csf drainage from nose. Lumbar drain has small amt of old drainage on dressing. Draining 100cc/hr per order of clear csf. sbp less than 150. Headache managed with norco, morphine and ice packs. Patient tolerated some lunch today with zofran given before meal. Good UO per xochitl. Abd dressing C,D,I. Nasal dressing nelli. BR continued with HOB at 30 degrees. Plan for  shunt on Friday. POC discussed with patient. Problem: NEUROLOGICAL - ADULT  Goal: Achieves stable or improved neurological status  Description: INTERVENTIONS  - Assess for and report changes in neurological status  - Initiate measures to prevent increased intracranial pressure  - Maintain blood pressure and fluid volume within ordered parameters to optimize cerebral perfusion and minimize risk of hemorrhage  - Monitor temperature, glucose, and sodium.  Initiate appropriate interventions as ordered  Outcome: Progressing     Problem: PAIN - ADULT  Goal: Verbalizes/displays adequate comfort level or patient's stated pain goal  Description: INTERVENTIONS:  - Encourage pt to monitor pain and request assistance  - Assess pain using appropriate pain scale  - Administer analgesics based on type and severity of pain and evaluate response  - Implement non-pharmacological measures as appropriate and evaluate response  - Consider cultural and social influences on pain and pain management  - Manage/alleviate anxiety  - Utilize distraction and/or relaxation techniques  - Monitor for opioid side effects  - Notify MD/LIP if interventions unsuccessful or patient reports new pain  - Anticipate increased pain with activity and pre-medicate as appropriate  Outcome: Progressing

## 2023-02-09 LAB
ALBUMIN SERPL-MCNC: 3.1 G/DL (ref 3.4–5)
ALBUMIN/GLOB SERPL: 0.8 {RATIO} (ref 1–2)
ALP LIVER SERPL-CCNC: 116 U/L
ALT SERPL-CCNC: 62 U/L
ANION GAP SERPL CALC-SCNC: 7 MMOL/L (ref 0–18)
AST SERPL-CCNC: 23 U/L (ref 15–37)
BASOPHILS # BLD AUTO: 0.03 X10(3) UL (ref 0–0.2)
BASOPHILS NFR BLD AUTO: 0.7 %
BILIRUB SERPL-MCNC: 0.3 MG/DL (ref 0.1–2)
BUN BLD-MCNC: 13 MG/DL (ref 7–18)
CALCIUM BLD-MCNC: 8.8 MG/DL (ref 8.5–10.1)
CHLORIDE SERPL-SCNC: 105 MMOL/L (ref 98–112)
CLARITY CSF: CLEAR
CO2 SERPL-SCNC: 25 MMOL/L (ref 21–32)
COLOR CSF: COLORLESS
CREAT BLD-MCNC: 0.91 MG/DL
EOSINOPHIL # BLD AUTO: 0.08 X10(3) UL (ref 0–0.7)
EOSINOPHIL NFR BLD AUTO: 1.8 %
ERYTHROCYTE [DISTWIDTH] IN BLOOD BY AUTOMATED COUNT: 15.1 %
GFR SERPLBLD BASED ON 1.73 SQ M-ARVRAT: 84 ML/MIN/1.73M2 (ref 60–?)
GLOBULIN PLAS-MCNC: 3.9 G/DL (ref 2.8–4.4)
GLUCOSE BLD-MCNC: 105 MG/DL (ref 70–99)
GLUCOSE CSF-MCNC: 66 MG/DL (ref 40–70)
HCT VFR BLD AUTO: 33.7 %
HGB BLD-MCNC: 10.4 G/DL
IMM GRANULOCYTES # BLD AUTO: 0.01 X10(3) UL (ref 0–1)
IMM GRANULOCYTES NFR BLD: 0.2 %
LYMPHOCYTES # BLD AUTO: 0.82 X10(3) UL (ref 1–4)
LYMPHOCYTES NFR BLD AUTO: 18.6 %
MAGNESIUM SERPL-MCNC: 2.1 MG/DL (ref 1.6–2.6)
MCH RBC QN AUTO: 23.6 PG (ref 26–34)
MCHC RBC AUTO-ENTMCNC: 30.9 G/DL (ref 31–37)
MCV RBC AUTO: 76.6 FL
MONOCYTES # BLD AUTO: 0.38 X10(3) UL (ref 0.1–1)
MONOCYTES NFR BLD AUTO: 8.6 %
NEUTROPHILS # BLD AUTO: 3.08 X10 (3) UL (ref 1.5–7.7)
NEUTROPHILS # BLD AUTO: 3.08 X10(3) UL (ref 1.5–7.7)
NEUTROPHILS NFR BLD AUTO: 70.1 %
OSMOLALITY SERPL CALC.SUM OF ELEC: 284 MOSM/KG (ref 275–295)
PLATELET # BLD AUTO: 325 10(3)UL (ref 150–450)
POTASSIUM SERPL-SCNC: 3.5 MMOL/L (ref 3.5–5.1)
PROT PATTERN CSF ELPH-IMP: 18.8 MG/DL (ref 15–45)
PROT SERPL-MCNC: 7 G/DL (ref 6.4–8.2)
RBC # BLD AUTO: 4.4 X10(6)UL
RBC # CSF: 1 /MM3 (ref ?–1)
SARS-COV-2 RNA RESP QL NAA+PROBE: NOT DETECTED
SODIUM SERPL-SCNC: 137 MMOL/L (ref 136–145)
TOTAL CELLS COUNTED CSF: 2 /MM3 (ref 0–5)
TOTAL VOLUME CSF: 3.5 ML
WBC # BLD AUTO: 4.4 X10(3) UL (ref 4–11)

## 2023-02-09 PROCEDURE — 009U3ZZ DRAINAGE OF SPINAL CANAL, PERCUTANEOUS APPROACH: ICD-10-PCS | Performed by: PHYSICIAN ASSISTANT

## 2023-02-09 PROCEDURE — 99232 SBSQ HOSP IP/OBS MODERATE 35: CPT | Performed by: HOSPITALIST

## 2023-02-10 ENCOUNTER — APPOINTMENT (OUTPATIENT)
Dept: ULTRASOUND IMAGING | Facility: HOSPITAL | Age: 38
End: 2023-02-10
Attending: HOSPITALIST
Payer: MEDICAID

## 2023-02-10 ENCOUNTER — APPOINTMENT (OUTPATIENT)
Dept: CT IMAGING | Facility: HOSPITAL | Age: 38
End: 2023-02-10
Attending: NEUROLOGICAL SURGERY
Payer: MEDICAID

## 2023-02-10 ENCOUNTER — ANESTHESIA (OUTPATIENT)
Dept: SURGERY | Facility: HOSPITAL | Age: 38
End: 2023-02-10
Payer: MEDICAID

## 2023-02-10 LAB
ALBUMIN SERPL-MCNC: 3.1 G/DL (ref 3.4–5)
ALBUMIN/GLOB SERPL: 0.6 {RATIO} (ref 1–2)
ALP LIVER SERPL-CCNC: 108 U/L
ALT SERPL-CCNC: 48 U/L
ANION GAP SERPL CALC-SCNC: 11 MMOL/L (ref 0–18)
AST SERPL-CCNC: 24 U/L (ref 15–37)
BASOPHILS # BLD AUTO: 0.01 X10(3) UL (ref 0–0.2)
BASOPHILS NFR BLD AUTO: 0.1 %
BILIRUB SERPL-MCNC: 0.3 MG/DL (ref 0.1–2)
BUN BLD-MCNC: 12 MG/DL (ref 7–18)
CALCIUM BLD-MCNC: 8.6 MG/DL (ref 8.5–10.1)
CHLORIDE SERPL-SCNC: 108 MMOL/L (ref 98–112)
CO2 SERPL-SCNC: 17 MMOL/L (ref 21–32)
CREAT BLD-MCNC: 0.92 MG/DL
EOSINOPHIL # BLD AUTO: 0 X10(3) UL (ref 0–0.7)
EOSINOPHIL NFR BLD AUTO: 0 %
ERYTHROCYTE [DISTWIDTH] IN BLOOD BY AUTOMATED COUNT: 15.4 %
GFR SERPLBLD BASED ON 1.73 SQ M-ARVRAT: 83 ML/MIN/1.73M2 (ref 60–?)
GLOBULIN PLAS-MCNC: 4.9 G/DL (ref 2.8–4.4)
GLUCOSE BLD-MCNC: 151 MG/DL (ref 70–99)
HCT VFR BLD AUTO: 35.8 %
HGB BLD-MCNC: 11.2 G/DL
IMM GRANULOCYTES # BLD AUTO: 0.04 X10(3) UL (ref 0–1)
IMM GRANULOCYTES NFR BLD: 0.4 %
LYMPHOCYTES # BLD AUTO: 0.67 X10(3) UL (ref 1–4)
LYMPHOCYTES NFR BLD AUTO: 6.8 %
MCH RBC QN AUTO: 23.6 PG (ref 26–34)
MCHC RBC AUTO-ENTMCNC: 31.3 G/DL (ref 31–37)
MCV RBC AUTO: 75.5 FL
MONOCYTES # BLD AUTO: 0.15 X10(3) UL (ref 0.1–1)
MONOCYTES NFR BLD AUTO: 1.5 %
NEUTROPHILS # BLD AUTO: 9.03 X10 (3) UL (ref 1.5–7.7)
NEUTROPHILS # BLD AUTO: 9.03 X10(3) UL (ref 1.5–7.7)
NEUTROPHILS NFR BLD AUTO: 91.2 %
OSMOLALITY SERPL CALC.SUM OF ELEC: 285 MOSM/KG (ref 275–295)
PLATELET # BLD AUTO: 396 10(3)UL (ref 150–450)
POTASSIUM SERPL-SCNC: 3.5 MMOL/L (ref 3.5–5.1)
PROT SERPL-MCNC: 8 G/DL (ref 6.4–8.2)
RBC # BLD AUTO: 4.74 X10(6)UL
SODIUM SERPL-SCNC: 136 MMOL/L (ref 136–145)
WBC # BLD AUTO: 9.9 X10(3) UL (ref 4–11)

## 2023-02-10 PROCEDURE — 8E09XBZ COMPUTER ASSISTED PROCEDURE OF HEAD AND NECK REGION: ICD-10-PCS | Performed by: NEUROLOGICAL SURGERY

## 2023-02-10 PROCEDURE — 93971 EXTREMITY STUDY: CPT | Performed by: HOSPITALIST

## 2023-02-10 PROCEDURE — 70450 CT HEAD/BRAIN W/O DYE: CPT | Performed by: NEUROLOGICAL SURGERY

## 2023-02-10 PROCEDURE — 99233 SBSQ HOSP IP/OBS HIGH 50: CPT | Performed by: HOSPITALIST

## 2023-02-10 PROCEDURE — 00160J6 BYPASS CEREBRAL VENTRICLE TO PERITONEAL CAVITY WITH SYNTHETIC SUBSTITUTE, OPEN APPROACH: ICD-10-PCS | Performed by: NEUROLOGICAL SURGERY

## 2023-02-10 PROCEDURE — 76942 ECHO GUIDE FOR BIOPSY: CPT | Performed by: ANESTHESIOLOGY

## 2023-02-10 PROCEDURE — 0WJG4ZZ INSPECTION OF PERITONEAL CAVITY, PERCUTANEOUS ENDOSCOPIC APPROACH: ICD-10-PCS | Performed by: STUDENT IN AN ORGANIZED HEALTH CARE EDUCATION/TRAINING PROGRAM

## 2023-02-10 RX ORDER — LIDOCAINE HYDROCHLORIDE AND EPINEPHRINE 10; 10 MG/ML; UG/ML
INJECTION, SOLUTION INFILTRATION; PERINEURAL AS NEEDED
Status: DISCONTINUED | OUTPATIENT
Start: 2023-02-10 | End: 2023-02-10 | Stop reason: HOSPADM

## 2023-02-10 RX ORDER — HYDROCODONE BITARTRATE AND ACETAMINOPHEN 10; 325 MG/1; MG/1
2 TABLET ORAL ONCE AS NEEDED
Status: COMPLETED | OUTPATIENT
Start: 2023-02-10 | End: 2023-02-10

## 2023-02-10 RX ORDER — ACETAMINOPHEN 500 MG
1000 TABLET ORAL ONCE AS NEEDED
Status: COMPLETED | OUTPATIENT
Start: 2023-02-10 | End: 2023-02-10

## 2023-02-10 RX ORDER — HYDROMORPHONE HYDROCHLORIDE 1 MG/ML
INJECTION, SOLUTION INTRAMUSCULAR; INTRAVENOUS; SUBCUTANEOUS
Status: COMPLETED
Start: 2023-02-10 | End: 2023-02-10

## 2023-02-10 RX ORDER — MIDAZOLAM HYDROCHLORIDE 1 MG/ML
1 INJECTION INTRAMUSCULAR; INTRAVENOUS EVERY 5 MIN PRN
Status: ACTIVE | OUTPATIENT
Start: 2023-02-10 | End: 2023-02-11

## 2023-02-10 RX ORDER — ONDANSETRON 2 MG/ML
4 INJECTION INTRAMUSCULAR; INTRAVENOUS EVERY 6 HOURS PRN
Status: DISCONTINUED | OUTPATIENT
Start: 2023-02-10 | End: 2023-02-13

## 2023-02-10 RX ORDER — LIDOCAINE HYDROCHLORIDE 10 MG/ML
INJECTION, SOLUTION EPIDURAL; INFILTRATION; INTRACAUDAL; PERINEURAL AS NEEDED
Status: DISCONTINUED | OUTPATIENT
Start: 2023-02-10 | End: 2023-02-10 | Stop reason: SURG

## 2023-02-10 RX ORDER — LABETALOL HYDROCHLORIDE 5 MG/ML
INJECTION, SOLUTION INTRAVENOUS AS NEEDED
Status: DISCONTINUED | OUTPATIENT
Start: 2023-02-10 | End: 2023-02-10 | Stop reason: SURG

## 2023-02-10 RX ORDER — ROCURONIUM BROMIDE 10 MG/ML
INJECTION, SOLUTION INTRAVENOUS AS NEEDED
Status: DISCONTINUED | OUTPATIENT
Start: 2023-02-10 | End: 2023-02-10 | Stop reason: SURG

## 2023-02-10 RX ORDER — MEPERIDINE HYDROCHLORIDE 25 MG/ML
12.5 INJECTION INTRAMUSCULAR; INTRAVENOUS; SUBCUTANEOUS AS NEEDED
Status: DISCONTINUED | OUTPATIENT
Start: 2023-02-10 | End: 2023-02-13 | Stop reason: ALTCHOICE

## 2023-02-10 RX ORDER — HYDROCODONE BITARTRATE AND ACETAMINOPHEN 10; 325 MG/1; MG/1
1 TABLET ORAL ONCE AS NEEDED
Status: COMPLETED | OUTPATIENT
Start: 2023-02-10 | End: 2023-02-10

## 2023-02-10 RX ORDER — HYDROMORPHONE HYDROCHLORIDE 1 MG/ML
0.6 INJECTION, SOLUTION INTRAMUSCULAR; INTRAVENOUS; SUBCUTANEOUS EVERY 5 MIN PRN
Status: ACTIVE | OUTPATIENT
Start: 2023-02-10 | End: 2023-02-11

## 2023-02-10 RX ORDER — PROCHLORPERAZINE EDISYLATE 5 MG/ML
5 INJECTION INTRAMUSCULAR; INTRAVENOUS EVERY 8 HOURS PRN
Status: DISCONTINUED | OUTPATIENT
Start: 2023-02-10 | End: 2023-02-13

## 2023-02-10 RX ORDER — NALOXONE HYDROCHLORIDE 0.4 MG/ML
80 INJECTION, SOLUTION INTRAMUSCULAR; INTRAVENOUS; SUBCUTANEOUS AS NEEDED
Status: ACTIVE | OUTPATIENT
Start: 2023-02-10 | End: 2023-02-11

## 2023-02-10 RX ORDER — ONDANSETRON 2 MG/ML
INJECTION INTRAMUSCULAR; INTRAVENOUS AS NEEDED
Status: DISCONTINUED | OUTPATIENT
Start: 2023-02-10 | End: 2023-02-10 | Stop reason: SURG

## 2023-02-10 RX ORDER — HYDROMORPHONE HYDROCHLORIDE 1 MG/ML
0.4 INJECTION, SOLUTION INTRAMUSCULAR; INTRAVENOUS; SUBCUTANEOUS EVERY 5 MIN PRN
Status: DISPENSED | OUTPATIENT
Start: 2023-02-10 | End: 2023-02-11

## 2023-02-10 RX ORDER — HYDROMORPHONE HYDROCHLORIDE 1 MG/ML
0.2 INJECTION, SOLUTION INTRAMUSCULAR; INTRAVENOUS; SUBCUTANEOUS EVERY 5 MIN PRN
Status: ACTIVE | OUTPATIENT
Start: 2023-02-10 | End: 2023-02-11

## 2023-02-10 RX ORDER — LABETALOL HYDROCHLORIDE 5 MG/ML
5 INJECTION, SOLUTION INTRAVENOUS EVERY 5 MIN PRN
Status: ACTIVE | OUTPATIENT
Start: 2023-02-10 | End: 2023-02-11

## 2023-02-10 RX ORDER — SODIUM CHLORIDE 9 MG/ML
INJECTION, SOLUTION INTRAVENOUS CONTINUOUS PRN
Status: DISCONTINUED | OUTPATIENT
Start: 2023-02-10 | End: 2023-02-10 | Stop reason: SURG

## 2023-02-10 RX ORDER — METOCLOPRAMIDE HYDROCHLORIDE 5 MG/ML
INJECTION INTRAMUSCULAR; INTRAVENOUS AS NEEDED
Status: DISCONTINUED | OUTPATIENT
Start: 2023-02-10 | End: 2023-02-10 | Stop reason: SURG

## 2023-02-10 RX ORDER — DEXAMETHASONE SODIUM PHOSPHATE 4 MG/ML
VIAL (ML) INJECTION AS NEEDED
Status: DISCONTINUED | OUTPATIENT
Start: 2023-02-10 | End: 2023-02-10 | Stop reason: SURG

## 2023-02-10 RX ORDER — SODIUM CHLORIDE, SODIUM LACTATE, POTASSIUM CHLORIDE, CALCIUM CHLORIDE 600; 310; 30; 20 MG/100ML; MG/100ML; MG/100ML; MG/100ML
INJECTION, SOLUTION INTRAVENOUS CONTINUOUS
Status: DISCONTINUED | OUTPATIENT
Start: 2023-02-10 | End: 2023-02-10

## 2023-02-10 RX ADMIN — DEXAMETHASONE SODIUM PHOSPHATE 8 MG: 4 MG/ML VIAL (ML) INJECTION at 13:40:00

## 2023-02-10 RX ADMIN — LABETALOL HYDROCHLORIDE 10 MG: 5 INJECTION, SOLUTION INTRAVENOUS at 14:40:00

## 2023-02-10 RX ADMIN — ROCURONIUM BROMIDE 30 MG: 10 INJECTION, SOLUTION INTRAVENOUS at 15:04:00

## 2023-02-10 RX ADMIN — LIDOCAINE HYDROCHLORIDE 50 MG: 10 INJECTION, SOLUTION EPIDURAL; INFILTRATION; INTRACAUDAL; PERINEURAL at 13:40:00

## 2023-02-10 RX ADMIN — ONDANSETRON 4 MG: 2 INJECTION INTRAMUSCULAR; INTRAVENOUS at 15:54:00

## 2023-02-10 RX ADMIN — SODIUM CHLORIDE: 9 INJECTION, SOLUTION INTRAVENOUS at 13:37:00

## 2023-02-10 RX ADMIN — METOCLOPRAMIDE HYDROCHLORIDE 10 MG: 5 INJECTION INTRAMUSCULAR; INTRAVENOUS at 15:54:00

## 2023-02-10 RX ADMIN — ROCURONIUM BROMIDE 50 MG: 10 INJECTION, SOLUTION INTRAVENOUS at 13:47:00

## 2023-02-10 RX ADMIN — SODIUM CHLORIDE: 9 INJECTION, SOLUTION INTRAVENOUS at 16:39:00

## 2023-02-10 NOTE — ANESTHESIA PROCEDURE NOTES
Airway  Date/Time: 2/10/2023 1:42 PM  Urgency: elective      General Information and Staff    Patient location during procedure: OR  Anesthesiologist: Jalen Hummel MD  Performed: anesthesiologist     Indications and Patient Condition  Indications for airway management: anesthesia  Sedation level: deep  Preoxygenated: yes  Patient position: sniffing  Mask difficulty assessment: 1 - vent by mask    Final Airway Details  Final airway type: endotracheal airway      Successful airway: ETT  Cuffed: yes   Successful intubation technique: Video laryngoscopy  Endotracheal tube insertion site: oral  Blade: GlideScope  Blade size: #4  ETT size (mm): 7.0    Cormack-Lehane Classification: grade I - full view of glottis  Placement verified by: chest auscultation and capnometry   Measured from: lips  ETT to lips (cm): 22  Number of attempts at approach: 1

## 2023-02-10 NOTE — PLAN OF CARE
Assumed care at 0730. Pt A/Ox4. Photosensivity, decrease in sensation LUE and numbness in bilateral toes. Neuro aware. Room Air. Lung sounds clear/diminished. Normal sinus rhythm. SBP < 150. Lumbar drain in place. Dressing reinforced. Draining 10ml/hr per Neurosurgery and ENT. Headache well controlled with Fiorcet. No complaints of nausea. Passing gas. Scheduled Senna given. RLQ dressing changed. Incision CDI. Purewick in place. Voiding. Possible  Shunt on Friday. POC updated with pt, questions answered, verbalized understanding. Problem: NEUROLOGICAL - ADULT  Goal: Achieves stable or improved neurological status  Description: INTERVENTIONS  - Assess for and report changes in neurological status  - Initiate measures to prevent increased intracranial pressure  - Maintain blood pressure and fluid volume within ordered parameters to optimize cerebral perfusion and minimize risk of hemorrhage  - Monitor temperature, glucose, and sodium.  Initiate appropriate interventions as ordered  Outcome: Progressing  Goal: Remains free of injury related to seizure activity  Description: INTERVENTIONS:  - Maintain airway, patient safety  and administer oxygen as ordered  - Monitor patient for seizure activity, document and report duration and description of seizure to MD/LIP  - If seizure occurs, turn patient to side and suction secretions as needed  - Reorient patient post seizure  - Seizure pads on all 4 side rails  - Instruct patient/family to notify RN of any seizure activity  - Instruct patient/family to call for assistance with activity based on assessment  Outcome: Progressing  Goal: Achieves maximal functionality and self care  Description: INTERVENTIONS  - Monitor swallowing and airway patency with patient fatigue and changes in neurological status  - Encourage and assist patient to increase activity and self care with guidance from PT/OT  - Encourage visually impaired, hearing impaired and aphasic patients to use assistive/communication devices  Outcome: Progressing     Problem: PAIN - ADULT  Goal: Verbalizes/displays adequate comfort level or patient's stated pain goal  Description: INTERVENTIONS:  - Encourage pt to monitor pain and request assistance  - Assess pain using appropriate pain scale  - Administer analgesics based on type and severity of pain and evaluate response  - Implement non-pharmacological measures as appropriate and evaluate response  - Consider cultural and social influences on pain and pain management  - Manage/alleviate anxiety  - Utilize distraction and/or relaxation techniques  - Monitor for opioid side effects  - Notify MD/LIP if interventions unsuccessful or patient reports new pain  - Anticipate increased pain with activity and pre-medicate as appropriate  Outcome: Progressing     Problem: RISK FOR INFECTION - ADULT  Goal: Absence of fever/infection during anticipated neutropenic period  Description: INTERVENTIONS  - Monitor WBC  - Administer growth factors as ordered  - Implement neutropenic guidelines  Outcome: Progressing     Problem: SAFETY ADULT - FALL  Goal: Free from fall injury  Description: INTERVENTIONS:  - Assess pt frequently for physical needs  - Identify cognitive and physical deficits and behaviors that affect risk of falls.   - [place] fall precautions as indicated by assessment.  - Educate pt/family on patient safety including physical limitations  - Instruct pt to call for assistance with activity based on assessment  - Modify environment to reduce risk of injury  - Provide assistive devices as appropriate  - Consider OT/PT consult to assist with strengthening/mobility  - Encourage toileting schedule  Outcome: Progressing  Problem: DISCHARGE PLANNING  Goal: Discharge to home or other facility with appropriate resources  Description: INTERVENTIONS:  - Identify barriers to discharge w/pt and caregiver  - Include patient/family/discharge partner in discharge planning  - Arrange for needed discharge resources and transportation as appropriate  - Identify discharge learning needs (meds, wound care, etc)  - Arrange for interpreters to assist at discharge as needed  - Consider post-discharge preferences of patient/family/discharge partner  - Complete POLST form as appropriate  - Assess patient's ability to be responsible for managing their own health  - Refer to Case Management Department for coordinating discharge planning if the patient needs post-hospital services based on physician/LIP order or complex needs related to functional status, cognitive ability or social support system  Outcome: Progressing

## 2023-02-10 NOTE — ADDENDUM NOTE
Addendum  created 02/10/23 1644 by Etelvina Rincon MD    Order list changed, Order sets accessed, Pharmacy for encounter modified

## 2023-02-10 NOTE — ANESTHESIA PROCEDURE NOTES
Peripheral IV  Date/Time: 2/10/2023 2:20 PM  Inserted by: Delia Saravia MD    Placement  Needle size: 20 G  Laterality: left  Location: neck.   Local anesthetic: none  Site prep: chlorhexidine  Technique: ultrasound guided  Attempts: 1

## 2023-02-10 NOTE — BRIEF OP NOTE
Pre-Operative Diagnosis: CSF Rhinorrhea     Post-Operative Diagnosis: CSF Rhinorrhea      Procedure Performed:   RIGHT OCCIPITAL VENTRICULOPERITONEAL SHUNT INSERTION    Surgeon(s) and Role:  Panel 1:     * Danielle Schulte MD - Primary  Panel 2:     Mary Matthews MD - Primary    Surgical Findings: CSF return with good flow out distal catheter within the peritoneum      Specimen: None     Estimated Blood Loss: Blood Output: 10 mL (5mL Dr. Queen Query Dr. Agus Martinez) (2/10/2023  4:10 PM)    Plan:  Q1' neuro checks  Keep lumbar drain clamped  Head CT  Shunt series  PT/OT  Normotension

## 2023-02-10 NOTE — ANESTHESIA PROCEDURE NOTES
Arterial Line    Date/Time: 2/10/2023 1:45 PM  Performed by: Jenifer Soliz MD  Authorized by: Jenifer Soliz MD     General Information and Staff    Procedure Start:  2/10/2023 1:45 PM  Procedure End:  2/10/2023 1:49 PM  Anesthesiologist:  Jenifer Soliz MD  Performed By:  Anesthesiologist  Patient Location:  OR  Indication: continuous blood pressure monitoring and blood sampling needed    Site Identification: real time ultrasound guided and surface landmarks    Preanesthetic Checklist: 2 patient identifiers, IV checked, risks and benefits discussed, monitors and equipment checked, pre-op evaluation, timeout performed, anesthesia consent and sterile technique used    Procedure Details    Catheter Size:  20 G  Catheter Length:  1 and 3/4 inch  Catheter Type:  Arrow  Seldinger Technique?: Yes    Laterality:  Right  Site:  Radial artery  Site Prep: chlorhexidine    Line Secured:  Wrist Brace, tape and Tegaderm    Assessment    Events: patient tolerated procedure well with no complications      Medications  2/10/2023 1:45 PM      Additional Comments

## 2023-02-10 NOTE — PLAN OF CARE
Lumbar drain clamped since 2000 on 2/9/23. Drsg reinforced w/tegaderm. Site clean and dry, no s/sx of leaking. Pt states she does feel some post-nasal gtt, occasional cough. Remnded not to blow nose, can blot any drainage that exits nares with gauze and save for nurse. Dr. Zan Nascimento rounds this am, instructs patient to sit up and even mobilize out of bed to test seal, checking for nasal drng. Pt assisted out of bed to chair at 1000. So far, one drop only, will continue to monitor. Headache increased. BP eelevated, treated with hydralazine. Pt reports dripping from left nare. Dr. Zan Nascimento at bedside. Decision made to move forward w/ shunt today. Pt taken for stat stealth CT Brain and then to OR  By this RN. Pt returns from OR at 1640. Dilaudid given for headache and abd pain. New SBP goal <160. Post-op CT Head done.

## 2023-02-10 NOTE — PLAN OF CARE
Assumed care of the pt at approx. 1930 pm. Neuro checks q 3 hrs at night. A&O x4, follows commands. Neurologically intact except for some decreased sensation In LUE & numbness/tingling in her toes (baseline). Lumbar drain clamped at 2000 pm per order. No CSF leakage noted since. Lumbar drain dressing CDI. Still with a headache, improved with Fioricet PRN. NSR, BP < 150 at goal. NPO past midnight, pt verbalized understanding. Had a large BM. Good UO per Rony. Updated on POC, verbalized understanding.

## 2023-02-11 ENCOUNTER — APPOINTMENT (OUTPATIENT)
Dept: GENERAL RADIOLOGY | Facility: HOSPITAL | Age: 38
End: 2023-02-11
Attending: NEUROLOGICAL SURGERY
Payer: MEDICAID

## 2023-02-11 PROCEDURE — 71045 X-RAY EXAM CHEST 1 VIEW: CPT | Performed by: NEUROLOGICAL SURGERY

## 2023-02-11 PROCEDURE — 70250 X-RAY EXAM OF SKULL: CPT | Performed by: NEUROLOGICAL SURGERY

## 2023-02-11 PROCEDURE — 74018 RADEX ABDOMEN 1 VIEW: CPT | Performed by: NEUROLOGICAL SURGERY

## 2023-02-11 PROCEDURE — 70360 X-RAY EXAM OF NECK: CPT | Performed by: NEUROLOGICAL SURGERY

## 2023-02-11 PROCEDURE — 99232 SBSQ HOSP IP/OBS MODERATE 35: CPT | Performed by: HOSPITALIST

## 2023-02-11 RX ORDER — LIDOCAINE HYDROCHLORIDE AND EPINEPHRINE 10; 10 MG/ML; UG/ML
INJECTION, SOLUTION INFILTRATION; PERINEURAL
Status: COMPLETED
Start: 2023-02-11 | End: 2023-02-11

## 2023-02-11 RX ORDER — LIDOCAINE HYDROCHLORIDE 10 MG/ML
INJECTION, SOLUTION EPIDURAL; INFILTRATION; INTRACAUDAL; PERINEURAL
Status: DISCONTINUED
Start: 2023-02-11 | End: 2023-02-11 | Stop reason: WASHOUT

## 2023-02-11 RX ORDER — LIDOCAINE HYDROCHLORIDE 10 MG/ML
1 INJECTION, SOLUTION INFILTRATION; PERINEURAL ONCE
Status: DISCONTINUED | OUTPATIENT
Start: 2023-02-11 | End: 2023-02-13 | Stop reason: ALTCHOICE

## 2023-02-11 NOTE — PLAN OF CARE
Assumed care of the pt at approx. 1930 pm. A&O x4, follows commands. Neuros intact except for mild tingling in her toes that is her baseline. Per pt her photosensitivity got a lot better after the surgery. Denies headache but c/o R-side head incisional pain, improved with [place] & Dilaudid PRN. Per discussion with Karen BROTHERS, lumbar drain to remain clamped overnight. The drain will be removed in the morning. NSR. Afebrile. SBP < 160, at goal. Advanced to clear liquids, tolerating. Good UO per fonseca, removed in am. Updated on POC & verbalized understanding. Family at bedside.

## 2023-02-11 NOTE — PLAN OF CARE
Assumed care of patient about 0730. Q1 neuros completed, see flowsheets for results. Lumbar drain removed by neurosurgery PA this am without complications. Patient laid flat for 4 hours per protocol. Dressing is C,D,I and site is soft. Patient has some dizziness and nausea when sitting up but resolves quickly. No nasal drainage noted. Zofran given x1 prior to dinner with improvement in nausea. PT worked with patient and helped her to the chair with walker and x1 assist. Patient complained of dizziness with this as well but resolved once she sat down. Norco given for HA with improvement. SBP WNL. Alex Chalet in place with good UO. Patient seems to have started her period this afternoon. Head incision is C,D,I along with both abd incisions. Xray series complete. A line removed with no issues and covered with dressing. POC discussed with patient. Problem: NEUROLOGICAL - ADULT  Goal: Achieves stable or improved neurological status  Description: INTERVENTIONS  - Assess for and report changes in neurological status  - Initiate measures to prevent increased intracranial pressure  - Maintain blood pressure and fluid volume within ordered parameters to optimize cerebral perfusion and minimize risk of hemorrhage  - Monitor temperature, glucose, and sodium.  Initiate appropriate interventions as ordered  Outcome: Progressing     Problem: PAIN - ADULT  Goal: Verbalizes/displays adequate comfort level or patient's stated pain goal  Description: INTERVENTIONS:  - Encourage pt to monitor pain and request assistance  - Assess pain using appropriate pain scale  - Administer analgesics based on type and severity of pain and evaluate response  - Implement non-pharmacological measures as appropriate and evaluate response  - Consider cultural and social influences on pain and pain management  - Manage/alleviate anxiety  - Utilize distraction and/or relaxation techniques  - Monitor for opioid side effects  - Notify MD/LIP if interventions unsuccessful or patient reports new pain  - Anticipate increased pain with activity and pre-medicate as appropriate  Outcome: Progressing

## 2023-02-11 NOTE — SLP NOTE
Orders received for bedside swallow evaluation. The patient was previously seen 02/07/23 with a diet recommendation of regular solids and thin liquids. Spoke with RN Adrianna Perez who reports the patient is tolerating the diet well, and will be able to resume po when restriction for supine positioning is lifted later this afternoon. RN to reach out to SLP if dysphagia is noted.       Marielena Downs 89  Speech Language Pathologist  Office phone: [phone]  Pager: [phone], #[phone]

## 2023-02-12 PROCEDURE — 99233 SBSQ HOSP IP/OBS HIGH 50: CPT | Performed by: HOSPITALIST

## 2023-02-12 NOTE — PHYSICAL THERAPY NOTE
PHYSICAL THERAPY TREATMENT NOTE - INPATIENT    Room Number: 1828/4019-Q     Session: 1    Number of Visits to Meet Established Goals: 3    Presenting Problem: CSF leak from nose - s/p repair and  shunt placement  Co-Morbidities : HTN, obesity, seizure, mixed anxiety and depressive disorder  ASSESSMENT   Very motivated and is very emotional with the goal of going home soonest. Progressing well. Cont to have a H/A. Pt will benefit from ongoing PT to continue to improve strength, endurance, balance, and gait in order to facilitate to  highest  functional skills. Recommend Home with HHPT at d/c to maximize functional independence and safety with mobilities at home      DISCHARGE RECOMMENDATIONS  PT Discharge Recommendations: Home with home health PT     PLAN  PT Treatment Plan: Bed mobility; Patient education;Gait training;Strengthening;Transfer training;Balance training  Rehab Potential : Good  Frequency (Obs): 3-5x/week      CURRENT GOALS     Goal #1 Patient is able to demonstrate supine - sit EOB @ level: modified independent      Goal #2 Patient is able to demonstrate transfers EOB to/from [facility] at assistance level: modified independent      Goal #3 Patient is able to ambulate 100 feet with assist device: Bariatric walker at assistance level: supervision      Goal #4 Up and down stairs w/ use of railing and CGA   Goal #5     Goal #6     Goal Comments: Goals established on 2023 all goals ongoing      SUBJECTIVE  I will do whatever I can to go home soon    OBJECTIVE       WEIGHT BEARING RESTRICTION  Weight Bearing Restriction: None                PAIN ASSESSMENT   Ratin  Location: R head - surgical site  Management Techniques:  Activity promotion;Repositioning    BALANCE                                                                                                                       Static Sitting: Good  Dynamic Sitting: Good           Static Standin [address]  Dynamic Standing: Fair    ACTIVITY TOLERANCE; fair+        AM-PAC '6-Clicks' INPATIENT SHORT FORM - BASIC MOBILITY  How much difficulty does the patient currently have. .. Patient Difficulty: Turning over in bed (including adjusting bedclothes, sheets and blankets)?: A Little   Patient Difficulty: Sitting down on and standing up from a chair with arms (e.g., wheelchair, bedside commode, etc.): A Little   Patient Difficulty: Moving from lying on back to sitting on the side of the bed?: A Lot   How much help from another person does the patient currently need. .. Help from Another: Moving to and from a bed to a chair (including a wheelchair)?: A Little   Help from Another: Need to walk in hospital room?: A Little   Help from Another: Climbing 3-5 steps with a railing?: A Lot       AM-PAC Score:  Raw Score: 16   Approx Degree of Impairment: 54.16%   Standardized Score (AM-PAC Scale): 40.78   CMS Modifier (G-Code): CK    FUNCTIONAL ABILITY STATUS  Gait Assessment   Functional Mobility/Gait Assessment  Gait Assistance: Minimum assistance  Distance (ft): 50,100,150 (needs more assist initially as she feels unsure of herself and evry emotional. Seated rest between bouts. chair following)  Assistive Device: Rolling walker  Pattern:  (slow steady gt, chair following)    Skilled Therapy Provided    Bed Mobility:  Rolling: NT   Supine<>Sit: NT   Sit<>Supine: NT    Transfer Mobility:  Sit<>Stand: SBA with extra time and cueing on approp hand placement  Stand<>Sit: SBA with cueing on approp hand placement  Gait: gt is slow and steady and with extra time , RW with CGA/min A and chair following.  Seated rest between above distance with initially apprehension as well as commitment to do as much as she can    Therapist's Comments:   Cont to have c/o H/A   Left on the recliner and instructed to walk as much as she can with nursing staff while in [address] [place] [address] [address]  training next visit as able      THERAPEUTIC EXERCISES  Lower Extremity Ankle pumps  Heel slides  Knee extension     Upper Extremity      Position sitting     Repetitions   10   Sets        Patient End of Session: Up in chair;Needs met;Call light within reach;RN aware of session/findings; All patient questions and concerns addressed; Alarm set    PT Session Time: 30 minutes  Gait Trainin minutes  Therapeutic Activity: 5 minutes  Therapeutic Exercise: 5 minutes   Neuromuscular Re-education:  minutes

## 2023-02-12 NOTE — PLAN OF CARE
UPON ASSESSMENT,     Pt alert and oriented x4. Pt able to voice needs and follow commands. Pt complaints of right sided headache where staples are in place upon am assessment. Pt given medication(norco) per emar, for pain management. Pt's neuro intact, but states tingling in bilateral toes, that is baseline. Pt sitting in room chair for most of the shift. Transfer orders in place. Per Dr. Janes Pinto, q1h neuro checks Dc'd. Call light within reach. Pt remains on monitor, and continuing plan of care.

## 2023-02-12 NOTE — PLAN OF CARE
Assumed care of the pt at approx. 1930 pm. A&O x4, follows commands. Neuro checks intact, tingling in toes. Headache gone but c/o R-side head incision pain. Pain improved wih [place] PRN. C/o feeling dizzy at times, mostly when ambulating but occasionally at rest as well. Denies SOB. NSR. Afebrile. BP < 160. Denies nausea. Good UO per joel. Ambulated in her room with a walker & 1-person assist. Tolerated well. Updated on POC.

## 2023-02-13 VITALS
TEMPERATURE: 97 F | RESPIRATION RATE: 17 BRPM | OXYGEN SATURATION: 99 % | WEIGHT: 293 LBS | BODY MASS INDEX: 52 KG/M2 | HEART RATE: 72 BPM | SYSTOLIC BLOOD PRESSURE: 96 MMHG | DIASTOLIC BLOOD PRESSURE: 78 MMHG

## 2023-02-13 LAB
ANION GAP SERPL CALC-SCNC: 9 MMOL/L (ref 0–18)
BUN BLD-MCNC: 14 MG/DL (ref 7–18)
CALCIUM BLD-MCNC: 8.8 MG/DL (ref 8.5–10.1)
CHLORIDE SERPL-SCNC: 107 MMOL/L (ref 98–112)
CO2 SERPL-SCNC: 22 MMOL/L (ref 21–32)
CREAT BLD-MCNC: 0.87 MG/DL
ERYTHROCYTE [DISTWIDTH] IN BLOOD BY AUTOMATED COUNT: 15.9 %
GFR SERPLBLD BASED ON 1.73 SQ M-ARVRAT: 89 ML/MIN/1.73M2 (ref 60–?)
GLUCOSE BLD-MCNC: 121 MG/DL (ref 70–99)
HCT VFR BLD AUTO: 36 %
HGB BLD-MCNC: 10.7 G/DL
MCH RBC QN AUTO: 23.5 PG (ref 26–34)
MCHC RBC AUTO-ENTMCNC: 29.7 G/DL (ref 31–37)
MCV RBC AUTO: 78.9 FL
OSMOLALITY SERPL CALC.SUM OF ELEC: 288 MOSM/KG (ref 275–295)
PLATELET # BLD AUTO: 291 10(3)UL (ref 150–450)
POTASSIUM SERPL-SCNC: 3.4 MMOL/L (ref 3.5–5.1)
RBC # BLD AUTO: 4.56 X10(6)UL
SODIUM SERPL-SCNC: 138 MMOL/L (ref 136–145)
WBC # BLD AUTO: 5.4 X10(3) UL (ref 4–11)

## 2023-02-13 PROCEDURE — 99239 HOSP IP/OBS DSCHRG MGMT >30: CPT | Performed by: HOSPITALIST

## 2023-02-13 RX ORDER — HYDROCODONE BITARTRATE AND ACETAMINOPHEN 7.5; 325 MG/1; MG/1
1 TABLET ORAL EVERY 6 HOURS PRN
Qty: 12 TABLET | Refills: 0 | Status: SHIPPED | OUTPATIENT
Start: 2023-02-13

## 2023-02-13 RX ORDER — POTASSIUM CHLORIDE 20 MEQ/1
40 TABLET, EXTENDED RELEASE ORAL ONCE
Status: COMPLETED | OUTPATIENT
Start: 2023-02-13 | End: 2023-02-13

## 2023-02-13 NOTE — PLAN OF CARE
Assumed care of patient about 0730. Neuros check results consistent with prior exams. Patient did well walking in the [place] with x1 and walker. SBP WNL. NSR on tele. Tolerating RA. Norco given for headache with improvement. Tolerating diet. R occipital staples intact with no drainage. Both abd incisions are C,D,I. All specialites are ok with patient dc home today. PT worked with patient and had her go up stairs. PT/OT recommending HH. DC orders placed. DC education explained to patient including med regimen and f/u appt. Patients boyfriend here to  patient. Patient left with dc papers and all belongings. Problem: NEUROLOGICAL - ADULT  Goal: Achieves stable or improved neurological status  Description: INTERVENTIONS  - Assess for and report changes in neurological status  - Initiate measures to prevent increased intracranial pressure  - Maintain blood pressure and fluid volume within ordered parameters to optimize cerebral perfusion and minimize risk of hemorrhage  - Monitor temperature, glucose, and sodium.  Initiate appropriate interventions as ordered  Outcome: Progressing     Problem: PAIN - ADULT  Goal: Verbalizes/displays adequate comfort level or patient's stated pain goal  Description: INTERVENTIONS:  - Encourage pt to monitor pain and request assistance  - Assess pain using appropriate pain scale  - Administer analgesics based on type and severity of pain and evaluate response  - Implement non-pharmacological measures as appropriate and evaluate response  - Consider cultural and social influences on pain and pain management  - Manage/alleviate anxiety  - Utilize distraction and/or relaxation techniques  - Monitor for opioid side effects  - Notify MD/LIP if interventions unsuccessful or patient reports new pain  - Anticipate increased pain with activity and pre-medicate as appropriate  Outcome: Progressing

## 2023-02-13 NOTE — CM/SW NOTE
02/13/23 1400   Discharge disposition   Expected discharge disposition Home or Self   Discharge transportation Private car     Pt discharging today. PT recommended  PT, but patient declined. Pt reports her life partner will be available for 24 hour supervision/support     / to remain available for support and/or discharge planning.      Raul Mott MBA MSN, RN CTL/  [id number]

## 2023-02-15 NOTE — OPERATIVE REPORT
MRN:  [id number]  Patient Name:  Sole Sesay  YOB: 1985     DATE OF OPERATION:     2/10/2023     PREOPERATIVE DIAGNOSIS:  Refractory CSF rhinorrhea     POSTOPERATIVE DIAGNOSIS:  Refractory CSF rhinorrhea     PROCEDURE:  1. Right-sided occipital ventriculoperitoneal shunt with laparoscopic placement of the peritoneal catheter. 2. Intraoperative neuronavigation     SURGEON:  Lizzy Mcintyre MD     CO-SURGEON:  Javed Tubbs MD     ANESTHESIA:  General.     BLOOD LOSS:  10 mL     IMPLANTS:  [employer] Strata II valve set at 1.5 with an antibiotic-impregnated ventricular and peritoneal catheter. DRAINS:  None. SPECIMENS:  None. JUSTIFICATION FOR TREATMENT:  The patient is a 42year old female who presented with six weeks of spontaneous CSF rhinorrhea who had been admitted to the hospital from clinic and lumbar drain was placed. We drained CSF for four days and then Dr. Robert Alvarez performed an endoscopic endonasal repair of an identified sphenoid sinus leak. She continued to drain her for a week with her head of bed flat, but once the lumbar drain was clamped and she was up out of bed, she had recurrent rhinorrhea. Risks and benefits of this operation were reviewed with the patient, and she has elected to proceed. DESCRIPTION OF PROCEDURE:  The patient was brought to the operating room and a timeout was performed. General anesthesia was induced with an endotracheal tube. All pressure points were padded. The head was turned towards the left side to expose the right retroauricular area. We used neuronavigation for this procedure and this was setup before the surgery. She was prepped with DuraPrep and draped in sterile fashion. We then began the operation by making inverted U incision 5 cm above and posterior to the top of his right pinna. We reflected the cutaneous flap inferiorly. We then used Bovie cautery over the planned shama hole area. We then made a shama hole.   We opened the dura with an 11 blade and bipolar. We then tunneled the catheter to the subcostal area per the discretion of Dr. Aleks Dougherty. He got laparoscopic access to the abdominal, into peritoneal cavity. We tunneled the catheter to this area and then we tied the valve to the catheter and it was pulled into pocket that we have created. The valve was confirmed to be the correct orientation prior to placement. We then stereotactically placed the catheter into the ventricular system. We got brisk return of CSF, although flow ceased with soft pass. We retracted the catheter until there was once again brisk return of CSF and this was the final position of the catheter. The ventricular catheter was tied to the valve with a silk suture. The valve was also secured in place to the pericranium. We then irrigated the wound copiously. We saw CSF from the distal peritoneal tip. Dr. Aleks Dougherty placed the peritoneal catheter into the intraperitoneal cavity. We saw CSF into the peritoneal cavity as well. The wound was copiously irrigated. We closed with 2-0 Vicryl, followed by staples and placed antibiotic ointment on this. The laparoscopic incisions were closed per the General Surgery team, which was dictated separately. The patient was extubated and at neurological baseline. Transferred back to ICU.

## 2023-02-17 ENCOUNTER — PATIENT OUTREACH (OUTPATIENT)
Dept: CASE MANAGEMENT | Age: 38
End: 2023-02-17

## 2023-02-21 ENCOUNTER — TELEPHONE (OUTPATIENT)
Dept: SURGERY | Facility: CLINIC | Age: 38
End: 2023-02-21

## 2023-02-21 NOTE — TELEPHONE ENCOUNTER
Pt is having a lot of headaches states headaches are more often now and more intense also is asking when she should have her stiches removed and with whom  please advise

## 2023-02-22 NOTE — TELEPHONE ENCOUNTER
Any PA can see pt to remove staples,   Pt should have a 2 week and 6 week post op appt scheduled. Called to schedule pt for post op appts. She was scheduled for 2 week post op appt on 2/4/23 with Donald Cuello at [phone]  She was scheduled for 6 week post op appt on 3/24/23 with Dr. Sav Schmidt at 7130      Pt was informed that message regarding headaches has been forwarded to providers, per pt nothing has changed since yesterday.    Pt aware if no response from providers she can discuss at her upcoming appt.     headache 4-7/10- migraine like headache with sensitivity to light, pt does not have neurologist.

## 2023-02-24 ENCOUNTER — OFFICE VISIT (OUTPATIENT)
Dept: SURGERY | Facility: CLINIC | Age: 38
End: 2023-02-24
Payer: MEDICAID

## 2023-02-24 VITALS
SYSTOLIC BLOOD PRESSURE: 122 MMHG | BODY MASS INDEX: 45.99 KG/M2 | OXYGEN SATURATION: 99 % | HEART RATE: 85 BPM | DIASTOLIC BLOOD PRESSURE: 84 MMHG | HEIGHT: 67 IN | WEIGHT: 293 LBS

## 2023-02-24 DIAGNOSIS — R51.9 FREQUENT HEADACHES: ICD-10-CM

## 2023-02-24 DIAGNOSIS — G93.2 IIH (IDIOPATHIC INTRACRANIAL HYPERTENSION): Primary | ICD-10-CM

## 2023-02-24 DIAGNOSIS — Z98.2 S/P VP SHUNT: ICD-10-CM

## 2023-02-24 PROCEDURE — 3074F SYST BP LT 130 MM HG: CPT | Performed by: PHYSICIAN ASSISTANT

## 2023-02-24 PROCEDURE — 3008F BODY MASS INDEX DOCD: CPT | Performed by: PHYSICIAN ASSISTANT

## 2023-02-24 PROCEDURE — 99024 POSTOP FOLLOW-UP VISIT: CPT | Performed by: PHYSICIAN ASSISTANT

## 2023-02-24 PROCEDURE — 3079F DIAST BP 80-89 MM HG: CPT | Performed by: PHYSICIAN ASSISTANT

## 2023-02-24 NOTE — PROGRESS NOTES
Established patient:  Reason for follow up: SX 2/3/2023 ENDOSCOPIC ENDONASAL CEREBROSPINAL FLUID LEAK REPAIR WITH ABDOMINAL FAT GRAFT    Numeric Rating Scale: (No Pain) 0  to  10 (Worst Pain)        Pain at Present:  5/10           Most recent treatments for Current Pain Condition:   Surgery  Response to treatment: complete resolution    New imaging or testing since your last office visit:

## 2023-06-23 ENCOUNTER — OFFICE VISIT (OUTPATIENT)
Dept: SURGERY | Facility: CLINIC | Age: 38
End: 2023-06-23
Payer: MEDICAID

## 2023-06-23 ENCOUNTER — OFFICE VISIT (OUTPATIENT)
Dept: NEUROLOGY | Facility: CLINIC | Age: 38
End: 2023-06-23
Payer: MEDICAID

## 2023-06-23 VITALS
HEART RATE: 80 BPM | BODY MASS INDEX: 57 KG/M2 | DIASTOLIC BLOOD PRESSURE: 82 MMHG | SYSTOLIC BLOOD PRESSURE: 138 MMHG | WEIGHT: 293 LBS | RESPIRATION RATE: 20 BRPM

## 2023-06-23 VITALS
DIASTOLIC BLOOD PRESSURE: 82 MMHG | WEIGHT: 293 LBS | BODY MASS INDEX: 45.99 KG/M2 | HEIGHT: 67 IN | SYSTOLIC BLOOD PRESSURE: 138 MMHG | HEART RATE: 80 BPM

## 2023-06-23 DIAGNOSIS — G96.00 CSF LEAK: Primary | ICD-10-CM

## 2023-06-23 DIAGNOSIS — R10.9 ABDOMINAL PAIN, UNSPECIFIED ABDOMINAL LOCATION: Primary | ICD-10-CM

## 2023-06-23 DIAGNOSIS — G43.019 INTRACTABLE MIGRAINE WITHOUT AURA AND WITHOUT STATUS MIGRAINOSUS: ICD-10-CM

## 2023-06-23 PROCEDURE — 3075F SYST BP GE 130 - 139MM HG: CPT | Performed by: NEUROLOGICAL SURGERY

## 2023-06-23 PROCEDURE — 99024 POSTOP FOLLOW-UP VISIT: CPT | Performed by: NEUROLOGICAL SURGERY

## 2023-06-23 PROCEDURE — 3008F BODY MASS INDEX DOCD: CPT | Performed by: NEUROLOGICAL SURGERY

## 2023-06-23 PROCEDURE — 3079F DIAST BP 80-89 MM HG: CPT | Performed by: NEUROLOGICAL SURGERY

## 2023-06-23 RX ORDER — SUMATRIPTAN 50 MG/1
TABLET, FILM COATED ORAL
Qty: 9 TABLET | Refills: 0 | Status: SHIPPED | OUTPATIENT
Start: 2023-06-23

## 2023-06-23 NOTE — PROGRESS NOTES
Patient had CSF leak in December, patched in February, then shunt placement due to still leaking. Headaches never went away but did lesson. Consist of right side of head. Did have seizures but went away 3 years ago.

## 2023-07-17 ENCOUNTER — PATIENT MESSAGE (OUTPATIENT)
Dept: NEUROLOGY | Facility: CLINIC | Age: 38
End: 2023-07-17

## 2023-07-17 RX ORDER — UBROGEPANT 50 MG/1
TABLET ORAL
Qty: 10 TABLET | Refills: 0 | Status: SHIPPED | OUTPATIENT
Start: 2023-07-17 | End: 2024-07-16

## 2023-07-17 NOTE — TELEPHONE ENCOUNTER
From: Kirsten Roberts  To: Nora Briseno MD  Sent: 7/17/2023 9:56 AM CDT  Subject: Migraine meds    Hello Dr Delfino Mcneal I'm contacting you because I tried the med a couple of times and it seems to make my chest hurt and my heart race. I think it's not working well with my blood pressure medication, so I'm afraid to take it now.

## 2023-11-01 ENCOUNTER — TELEPHONE (OUTPATIENT)
Dept: SURGERY | Facility: CLINIC | Age: 38
End: 2023-11-01

## 2023-11-01 NOTE — TELEPHONE ENCOUNTER
Received a call from the front office regarding pt concerns listed below.     Spoke with Provider JARROD Taylor to inform her pt is not attending to her appointment today. Provider recommended for nursing to inquire with  Shunt  to inquire how to obtain medical device identification cards.       Noted the patient has a Shunt Valve Strata II Regular - [employer]     Called [employer] Representative, no answer, left message to call back.

## 2023-11-01 NOTE — TELEPHONE ENCOUNTER
Pt called stating she was not able to get her MRI yesterday at [facility] because she did not have any info on her shunt. Pt was told at [facility] she should have a been given a wallet sized card to keep on her at all times in which she was not given.  Pt was advised she would need to contact the manufacture ([employer]). Pt was advised this info would be sent to her via [employer] per Magdaleno.

## 2023-11-01 NOTE — TELEPHONE ENCOUNTER
Estevan from [employer] returned phone call   Suggested to call [employer] Patient Services Number [phone]  Nursing acknowledged. Call was ended.       Called [employer] Patient Services Number [phone]  Spoke with Vin that the correct number for   Shunts is [phone]  Call was transferred to [phone]      Spoke to Nisa who stated that they do not provide eachj  individual    Blank Card and the clinical team will need to fill out the card.     Nisa fax over the form form/ID card for the clinical team to fill out and provide to the patient.     Form Received and completed.  Called the patient to inform that the ID Card for pt  Shunt MRI imaging   Pt states acknowledged that the card is completed. States either herself or her Life partner (Memo Beckham) as listed on her verbal release form.   Call was ended.     Form placed at the front office desk   Copy sent to scan with cover sheet Y  Copy kept in the office until verified in media Y

## 2023-12-04 ENCOUNTER — OFFICE VISIT (OUTPATIENT)
Dept: SURGERY | Facility: CLINIC | Age: 38
End: 2023-12-04
Payer: MEDICAID

## 2023-12-04 VITALS
DIASTOLIC BLOOD PRESSURE: 86 MMHG | WEIGHT: 293 LBS | HEIGHT: 67 IN | BODY MASS INDEX: 45.99 KG/M2 | HEART RATE: 83 BPM | SYSTOLIC BLOOD PRESSURE: 132 MMHG

## 2023-12-04 DIAGNOSIS — Z98.2 S/P VP SHUNT: Primary | ICD-10-CM

## 2023-12-04 PROCEDURE — 3079F DIAST BP 80-89 MM HG: CPT | Performed by: NURSE PRACTITIONER

## 2023-12-04 PROCEDURE — 3008F BODY MASS INDEX DOCD: CPT | Performed by: NURSE PRACTITIONER

## 2023-12-04 PROCEDURE — 99213 OFFICE O/P EST LOW 20 MIN: CPT | Performed by: NURSE PRACTITIONER

## 2023-12-04 PROCEDURE — 3075F SYST BP GE 130 - 139MM HG: CPT | Performed by: NURSE PRACTITIONER

## 2023-12-15 ENCOUNTER — OFFICE VISIT (OUTPATIENT)
Dept: NEUROLOGY | Facility: CLINIC | Age: 38
End: 2023-12-15
Payer: MEDICAID

## 2023-12-15 VITALS
RESPIRATION RATE: 16 BRPM | WEIGHT: 293 LBS | BODY MASS INDEX: 45.99 KG/M2 | SYSTOLIC BLOOD PRESSURE: 122 MMHG | DIASTOLIC BLOOD PRESSURE: 78 MMHG | HEIGHT: 67 IN | HEART RATE: 88 BPM

## 2023-12-15 DIAGNOSIS — G43.019 INTRACTABLE MIGRAINE WITHOUT AURA AND WITHOUT STATUS MIGRAINOSUS: Primary | ICD-10-CM

## 2023-12-15 DIAGNOSIS — G96.00 CSF LEAK: ICD-10-CM

## 2023-12-15 PROCEDURE — 99215 OFFICE O/P EST HI 40 MIN: CPT | Performed by: OTHER

## 2023-12-15 PROCEDURE — 3008F BODY MASS INDEX DOCD: CPT | Performed by: OTHER

## 2023-12-15 PROCEDURE — 3078F DIAST BP <80 MM HG: CPT | Performed by: OTHER

## 2023-12-15 PROCEDURE — 3074F SYST BP LT 130 MM HG: CPT | Performed by: OTHER

## 2023-12-15 RX ORDER — UBROGEPANT 50 MG/1
TABLET ORAL
Qty: 10 TABLET | Refills: 5 | Status: SHIPPED | OUTPATIENT
Start: 2023-12-15 | End: 2024-12-14

## 2023-12-15 RX ORDER — TOPIRAMATE 25 MG/1
TABLET ORAL
Qty: 180 TABLET | Refills: 3 | Status: SHIPPED | OUTPATIENT
Start: 2023-12-15

## 2024-06-02 DIAGNOSIS — G43.019 INTRACTABLE MIGRAINE WITHOUT AURA AND WITHOUT STATUS MIGRAINOSUS: Primary | ICD-10-CM

## 2024-06-02 RX ORDER — TOPIRAMATE 25 MG/1
TABLET ORAL
Qty: 180 TABLET | Refills: 3 | Status: CANCELLED | OUTPATIENT
Start: 2024-06-02

## 2024-06-04 RX ORDER — TOPIRAMATE 25 MG/1
75 TABLET ORAL 2 TIMES DAILY
Qty: 180 TABLET | Refills: 2 | Status: SHIPPED | OUTPATIENT
Start: 2024-06-04

## 2024-06-04 NOTE — TELEPHONE ENCOUNTER
Medication: TOPIRAMATE 25 MG Oral Tab      Date of last refill: 12/15/2023 (#180/3)  [place]     Last office visit: 12/15/2023  Due back to clinic per last office note:  Around 05/15/2024  Date next office visit scheduled:    No future appointments.        Last OV note recommendation:    Plan:  Ubrelvy prn  Topamax started, reviewed side effects and advised hydration  Neurosurgery to follow  Headache diary advised  Migraine and Medication overuse headache education given  See orders and medications filed with this encounter. The patient indicates understanding of these issues and agrees with the plan.  Discussed with patient regarding assessment, work up, care plan   RTC 4-6 months, update in 6 weeks requested  Pt should go ER for any new or worsening symptoms and contact office

## 2024-06-04 NOTE — TELEPHONE ENCOUNTER
Refused. Duplicate request. Will inform the patient.       Medication:  topiramate 25 MG Oral Tab      Date of last refill: 12/15/2023 (#180/3)  [place]     Last office visit: 12/15/2023  Due back to clinic per last office note:  Around 05/15/2024  Date next office visit scheduled:    No future appointments.        Last OV note recommendation:    Plan:  Ubrelvy prn  Topamax started, reviewed side effects and advised hydration  Neurosurgery to follow  Headache diary advised  Migraine and Medication overuse headache education given  See orders and medications filed with this encounter. The patient indicates understanding of these issues and agrees with the plan.  Discussed with patient regarding assessment, work up, care plan   RTC 4-6 months, update in 6 weeks requested  Pt should go ER for any new or worsening symptoms and contact office

## 2024-07-05 ENCOUNTER — APPOINTMENT (OUTPATIENT)
Dept: CT IMAGING | Facility: HOSPITAL | Age: 39
End: 2024-07-05
Attending: EMERGENCY MEDICINE
Payer: MEDICAID

## 2024-07-05 ENCOUNTER — HOSPITAL ENCOUNTER (INPATIENT)
Facility: HOSPITAL | Age: 39
LOS: 6 days | Discharge: HOME | End: 2024-07-11
Attending: EMERGENCY MEDICINE | Admitting: HOSPITALIST
Payer: MEDICAID

## 2024-07-05 DIAGNOSIS — R10.9 ABDOMINAL PAIN OF UNKNOWN ETIOLOGY: Primary | ICD-10-CM

## 2024-07-05 DIAGNOSIS — K86.89 PANCREATIC MASS (HCC): ICD-10-CM

## 2024-07-05 DIAGNOSIS — R16.0 LIVER MASSES: ICD-10-CM

## 2024-07-05 DIAGNOSIS — R74.8 ELEVATED LIVER ENZYMES: ICD-10-CM

## 2024-07-05 PROBLEM — R73.9 HYPERGLYCEMIA: Status: ACTIVE | Noted: 2024-07-05

## 2024-07-05 LAB
AFP-TM SERPL-MCNC: 2.2 NG/ML (ref ?–8)
ALBUMIN SERPL-MCNC: 3.6 G/DL (ref 3.4–5)
ALBUMIN/GLOB SERPL: 0.8 {RATIO} (ref 1–2)
ALP LIVER SERPL-CCNC: 233 U/L
ALT SERPL-CCNC: 308 U/L
ANION GAP SERPL CALC-SCNC: 6 MMOL/L (ref 0–18)
AST SERPL-CCNC: 396 U/L (ref 15–37)
B-HCG UR QL: NEGATIVE
BASOPHILS # BLD AUTO: 0.02 X10(3) UL (ref 0–0.2)
BASOPHILS NFR BLD AUTO: 0.5 %
BILIRUB SERPL-MCNC: 0.9 MG/DL (ref 0.1–2)
BILIRUB UR QL STRIP.AUTO: NEGATIVE
BUN BLD-MCNC: 18 MG/DL (ref 9–23)
CALCIUM BLD-MCNC: 8.8 MG/DL (ref 8.5–10.1)
CANCER AG19-9 SERPL-ACNC: <2 U/ML (ref ?–37)
CHLORIDE SERPL-SCNC: 108 MMOL/L (ref 98–112)
CLARITY UR REFRACT.AUTO: CLEAR
CO2 SERPL-SCNC: 23 MMOL/L (ref 21–32)
COLOR UR AUTO: YELLOW
CREAT BLD-MCNC: 1.01 MG/DL
DEPRECATED HBV CORE AB SER IA-ACNC: 11.1 NG/ML
EGFRCR SERPLBLD CKD-EPI 2021: 74 ML/MIN/1.73M2 (ref 60–?)
EOSINOPHIL # BLD AUTO: 0.01 X10(3) UL (ref 0–0.7)
EOSINOPHIL NFR BLD AUTO: 0.2 %
ERYTHROCYTE [DISTWIDTH] IN BLOOD BY AUTOMATED COUNT: 15.6 %
GLOBULIN PLAS-MCNC: 4.6 G/DL (ref 2.8–4.4)
GLUCOSE BLD-MCNC: 124 MG/DL (ref 70–99)
GLUCOSE UR STRIP.AUTO-MCNC: NORMAL MG/DL
HAV IGM SER QL: NONREACTIVE
HBV CORE IGM SER QL: NONREACTIVE
HBV SURFACE AG SERPL QL IA: NONREACTIVE
HCT VFR BLD AUTO: 33.9 %
HCV AB SERPL QL IA: NONREACTIVE
HGB BLD-MCNC: 10.3 G/DL
IMM GRANULOCYTES # BLD AUTO: 0 X10(3) UL (ref 0–1)
IMM GRANULOCYTES NFR BLD: 0 %
IRON SATN MFR SERPL: 4 %
IRON SERPL-MCNC: 20 UG/DL
KETONES UR STRIP.AUTO-MCNC: NEGATIVE MG/DL
LEUKOCYTE ESTERASE UR QL STRIP.AUTO: NEGATIVE
LIPASE SERPL-CCNC: 36 U/L (ref 13–75)
LYMPHOCYTES # BLD AUTO: 0.97 X10(3) UL (ref 1–4)
LYMPHOCYTES NFR BLD AUTO: 22.4 %
MCH RBC QN AUTO: 22.4 PG (ref 26–34)
MCHC RBC AUTO-ENTMCNC: 30.4 G/DL (ref 31–37)
MCV RBC AUTO: 73.7 FL
MONOCYTES # BLD AUTO: 0.32 X10(3) UL (ref 0.1–1)
MONOCYTES NFR BLD AUTO: 7.4 %
NEUTROPHILS # BLD AUTO: 3.02 X10 (3) UL (ref 1.5–7.7)
NEUTROPHILS # BLD AUTO: 3.02 X10(3) UL (ref 1.5–7.7)
NEUTROPHILS NFR BLD AUTO: 69.5 %
NITRITE UR QL STRIP.AUTO: NEGATIVE
OSMOLALITY SERPL CALC.SUM OF ELEC: 287 MOSM/KG (ref 275–295)
PH UR STRIP.AUTO: 6 [PH] (ref 5–8)
PLATELET # BLD AUTO: 414 10(3)UL (ref 150–450)
POTASSIUM SERPL-SCNC: 3.9 MMOL/L (ref 3.5–5.1)
PROT SERPL-MCNC: 8.2 G/DL (ref 6.4–8.2)
PROT UR STRIP.AUTO-MCNC: NEGATIVE MG/DL
RBC # BLD AUTO: 4.6 X10(6)UL
RBC UR QL AUTO: NEGATIVE
SODIUM SERPL-SCNC: 137 MMOL/L (ref 136–145)
SP GR UR STRIP.AUTO: 1.02 (ref 1–1.03)
TIBC SERPL-MCNC: 514 UG/DL (ref 240–450)
TRANSFERRIN SERPL-MCNC: 345 MG/DL (ref 200–360)
UROBILINOGEN UR STRIP.AUTO-MCNC: 8 MG/DL
WBC # BLD AUTO: 4.3 X10(3) UL (ref 4–11)

## 2024-07-05 PROCEDURE — 99223 1ST HOSP IP/OBS HIGH 75: CPT | Performed by: INTERNAL MEDICINE

## 2024-07-05 PROCEDURE — 74177 CT ABD & PELVIS W/CONTRAST: CPT | Performed by: EMERGENCY MEDICINE

## 2024-07-05 RX ORDER — ACETAMINOPHEN 500 MG
500 TABLET ORAL EVERY 4 HOURS PRN
Status: DISCONTINUED | OUTPATIENT
Start: 2024-07-05 | End: 2024-07-11

## 2024-07-05 RX ORDER — HYDROCHLOROTHIAZIDE 25 MG/1
25 TABLET ORAL NIGHTLY
Status: DISCONTINUED | OUTPATIENT
Start: 2024-07-05 | End: 2024-07-11

## 2024-07-05 RX ORDER — SENNOSIDES 8.6 MG
17.2 TABLET ORAL NIGHTLY PRN
Status: DISCONTINUED | OUTPATIENT
Start: 2024-07-05 | End: 2024-07-11

## 2024-07-05 RX ORDER — HYDROMORPHONE HYDROCHLORIDE 1 MG/ML
0.8 INJECTION, SOLUTION INTRAMUSCULAR; INTRAVENOUS; SUBCUTANEOUS EVERY 2 HOUR PRN
Status: DISCONTINUED | OUTPATIENT
Start: 2024-07-05 | End: 2024-07-11

## 2024-07-05 RX ORDER — BENZONATATE 100 MG/1
200 CAPSULE ORAL 3 TIMES DAILY PRN
Status: DISCONTINUED | OUTPATIENT
Start: 2024-07-05 | End: 2024-07-11

## 2024-07-05 RX ORDER — BISACODYL 10 MG
10 SUPPOSITORY, RECTAL RECTAL
Status: DISCONTINUED | OUTPATIENT
Start: 2024-07-05 | End: 2024-07-11

## 2024-07-05 RX ORDER — POLYETHYLENE GLYCOL 3350 17 G/17G
17 POWDER, FOR SOLUTION ORAL DAILY PRN
Status: DISCONTINUED | OUTPATIENT
Start: 2024-07-05 | End: 2024-07-11

## 2024-07-05 RX ORDER — KETOROLAC TROMETHAMINE 15 MG/ML
15 INJECTION, SOLUTION INTRAMUSCULAR; INTRAVENOUS ONCE
Status: COMPLETED | OUTPATIENT
Start: 2024-07-05 | End: 2024-07-05

## 2024-07-05 RX ORDER — HYDROMORPHONE HYDROCHLORIDE 1 MG/ML
0.5 INJECTION, SOLUTION INTRAMUSCULAR; INTRAVENOUS; SUBCUTANEOUS ONCE
Status: DISCONTINUED | OUTPATIENT
Start: 2024-07-05 | End: 2024-07-05

## 2024-07-05 RX ORDER — HYDROMORPHONE HYDROCHLORIDE 1 MG/ML
0.5 INJECTION, SOLUTION INTRAMUSCULAR; INTRAVENOUS; SUBCUTANEOUS EVERY 30 MIN PRN
Status: DISCONTINUED | OUTPATIENT
Start: 2024-07-05 | End: 2024-07-05

## 2024-07-05 RX ORDER — HYDROMORPHONE HYDROCHLORIDE 1 MG/ML
0.2 INJECTION, SOLUTION INTRAMUSCULAR; INTRAVENOUS; SUBCUTANEOUS EVERY 2 HOUR PRN
Status: DISCONTINUED | OUTPATIENT
Start: 2024-07-05 | End: 2024-07-11

## 2024-07-05 RX ORDER — AMLODIPINE BESYLATE 5 MG/1
10 TABLET ORAL NIGHTLY
Status: DISCONTINUED | OUTPATIENT
Start: 2024-07-05 | End: 2024-07-11

## 2024-07-05 RX ORDER — ONDANSETRON 2 MG/ML
4 INJECTION INTRAMUSCULAR; INTRAVENOUS EVERY 6 HOURS PRN
Status: DISCONTINUED | OUTPATIENT
Start: 2024-07-05 | End: 2024-07-11

## 2024-07-05 RX ORDER — HYDROMORPHONE HYDROCHLORIDE 1 MG/ML
0.4 INJECTION, SOLUTION INTRAMUSCULAR; INTRAVENOUS; SUBCUTANEOUS EVERY 2 HOUR PRN
Status: DISCONTINUED | OUTPATIENT
Start: 2024-07-05 | End: 2024-07-11

## 2024-07-05 RX ORDER — LOSARTAN POTASSIUM 100 MG/1
100 TABLET ORAL NIGHTLY
Status: DISCONTINUED | OUTPATIENT
Start: 2024-07-05 | End: 2024-07-11

## 2024-07-05 RX ORDER — TRAMADOL HYDROCHLORIDE 50 MG/1
50 TABLET ORAL EVERY 6 HOURS PRN
Status: DISCONTINUED | OUTPATIENT
Start: 2024-07-05 | End: 2024-07-11

## 2024-07-05 RX ORDER — HEPARIN SODIUM 5000 [USP'U]/ML
5000 INJECTION, SOLUTION INTRAVENOUS; SUBCUTANEOUS EVERY 8 HOURS SCHEDULED
Status: DISCONTINUED | OUTPATIENT
Start: 2024-07-05 | End: 2024-07-05

## 2024-07-05 RX ORDER — ENEMA 19; 7 G/133ML; G/133ML
1 ENEMA RECTAL ONCE AS NEEDED
Status: DISCONTINUED | OUTPATIENT
Start: 2024-07-05 | End: 2024-07-11

## 2024-07-05 RX ORDER — METOPROLOL SUCCINATE 50 MG/1
50 TABLET, EXTENDED RELEASE ORAL NIGHTLY
Status: DISCONTINUED | OUTPATIENT
Start: 2024-07-05 | End: 2024-07-11

## 2024-07-05 RX ORDER — TOPIRAMATE 25 MG/1
75 TABLET ORAL 2 TIMES DAILY
Status: DISCONTINUED | OUTPATIENT
Start: 2024-07-05 | End: 2024-07-11

## 2024-07-05 RX ORDER — PROCHLORPERAZINE EDISYLATE 5 MG/ML
5 INJECTION INTRAMUSCULAR; INTRAVENOUS EVERY 8 HOURS PRN
Status: DISCONTINUED | OUTPATIENT
Start: 2024-07-05 | End: 2024-07-11

## 2024-07-05 RX ORDER — MELATONIN
3 NIGHTLY PRN
Status: DISCONTINUED | OUTPATIENT
Start: 2024-07-05 | End: 2024-07-11

## 2024-07-05 RX ORDER — SODIUM CHLORIDE 9 MG/ML
INJECTION, SOLUTION INTRAVENOUS CONTINUOUS
Status: DISCONTINUED | OUTPATIENT
Start: 2024-07-05 | End: 2024-07-05

## 2024-07-05 RX ORDER — HEPARIN SODIUM 5000 [USP'U]/ML
7500 INJECTION, SOLUTION INTRAVENOUS; SUBCUTANEOUS EVERY 8 HOURS SCHEDULED
Status: DISCONTINUED | OUTPATIENT
Start: 2024-07-05 | End: 2024-07-06

## 2024-07-05 RX ORDER — SODIUM CHLORIDE 9 MG/ML
INJECTION, SOLUTION INTRAVENOUS CONTINUOUS
Status: ACTIVE | OUTPATIENT
Start: 2024-07-05 | End: 2024-07-06

## 2024-07-05 RX ORDER — ONDANSETRON 2 MG/ML
4 INJECTION INTRAMUSCULAR; INTRAVENOUS ONCE
Status: COMPLETED | OUTPATIENT
Start: 2024-07-05 | End: 2024-07-05

## 2024-07-05 RX ORDER — KETOROLAC TROMETHAMINE 15 MG/ML
15 INJECTION, SOLUTION INTRAMUSCULAR; INTRAVENOUS EVERY 6 HOURS PRN
Status: ACTIVE | OUTPATIENT
Start: 2024-07-05 | End: 2024-07-07

## 2024-07-05 RX ORDER — ONDANSETRON 2 MG/ML
4 INJECTION INTRAMUSCULAR; INTRAVENOUS EVERY 4 HOURS PRN
Status: DISCONTINUED | OUTPATIENT
Start: 2024-07-05 | End: 2024-07-05

## 2024-07-05 RX ORDER — SODIUM CHLORIDE 9 MG/ML
125 INJECTION, SOLUTION INTRAVENOUS CONTINUOUS
Status: DISCONTINUED | OUTPATIENT
Start: 2024-07-05 | End: 2024-07-05

## 2024-07-05 RX ORDER — ECHINACEA PURPUREA EXTRACT 125 MG
1 TABLET ORAL
Status: DISCONTINUED | OUTPATIENT
Start: 2024-07-05 | End: 2024-07-11

## 2024-07-05 NOTE — ED QUICK NOTES
Orders for admission, patient is aware of plan and ready to go upstairs. Any questions, please [place] Corrie [place] at [phone].    Patient Covid vaccination status: Unvaccinated     COVID Test Ordered in ED: None    COVID Suspicion at Admission: N/A    Running Infusions:    sodium chloride Stopped (07/05/24 1515)    None    Mental Status/LOC at time of transport: A&Ox4    Other pertinent information:   CIWA score: N/A   NIH score:  N/A

## 2024-07-05 NOTE — ED PROVIDER NOTES
Patient Seen in: [facility] Emergency Department      History     Chief Complaint   Patient presents with    Abdomen/Flank Pain     Stated Complaint: Abd painn since last noc    Subjective:   HPI    Patient is a 39-year-old female who last night developed severe abdominal pain.  Patient with sharp stabbing 8 out of 10 periumbilical location.  Patient states been fairly constant currently 6 out of 10.  Patient has nausea but no vomiting.  Patient denies any diarrhea.  Patient believes last bowel movement was a day ago.  Patient denies any blood in her stool, no black stool.  Patient states she has had some intermittent pain for the last few months with alternating constipation and diarrhea.  Patient has not had any workup done regarding this.  Patient denies hematuria or dysuria, no chest pain, shortness of breath, no headache.  Remainder of review of systems negative.    Objective:   Past Medical History:    Anxiety    Asthma (HCC)    Back problem    Depression    Diastolic dysfunction    Extrinsic asthma, unspecified    High blood pressure    Lymphadenitis    Migraines    Obesity    Pituitary tumor    Pulmonary hypertension (HCC)    Seizure disorder (HCC)    Shortness of breath    Sleep apnea              Past Surgical History:   Procedure Laterality Date    Adenoidectomy      Other surgical history  9-4-13 [facility]     Incision and Drainage of Deep Left Axillary Abscess    Other surgical history  02/03/2023    ENDOSCOPIC ENDONASAL CEREBROSPINAL FLUID LEAK REPAIR WITH ABDOMINAL FAT GRAFT    Other surgical history      Other surgical history  02/10/2023    RIGHT OCCIPITAL VENTRICULOPERITONEAL SHUNT INSERTION    Tonsillectomy                  Social History     Socioeconomic History    Marital status: Single   Tobacco Use    Smoking status: Never     Passive exposure: Never    Smokeless tobacco: Never   Vaping Use    Vaping status: Never Used   Substance and Sexual Activity    Alcohol use: Yes     Comment:  socially    Drug use: No   Other Topics Concern    Caffeine Concern No    Exercise Yes     Comment: walks     Social Determinants of Health      Received from [facility]          Non-smoker, no alcohol no drugs    Review of Systems    Positive for stated Chief Complaint: Abdomen/Flank Pain    Other systems are as noted in HPI.  Constitutional and vital signs reviewed.      All other systems reviewed and negative except as noted above.    Physical Exam     ED Triage Vitals   BP 07/05/24 1118 143/70   Pulse 07/05/24 1118 82   Resp 07/05/24 1118 16   Temp 07/05/24 1118 98.2 °F (36.8 °C)   Temp src 07/05/24 1118 Temporal   SpO2 07/05/24 1118 100 %   O2 Device 07/05/24 1215 None (Room air)       Current Vitals:   Vital Signs  BP: 103/66  Pulse: 59  Resp: 18  Temp: 98.2 °F (36.8 °C)  Temp src: Temporal  MAP (mmHg): 76    Oxygen Therapy  SpO2: 99 %  O2 Device: None (Room air)            Physical Exam  GENERAL: Patient resting  on the cart in no acute distress.  HEENT: Extraocular muscles intact, pupils equal round reactive to light  Mouth normal, neck supple, no meningismus.  LUNGS: Lungs clear to auscultation bilaterally.  CARDIOVASCULAR: + S1-S2, regular rate and rhythm, no murmurs.  BACK: No CVA tenderness, no midline bony tenderness.  ABDOMEN: + Bowel sounds, soft, moderate diffuse tenderness, nondistended.  No rebound, no guarding, no hepatosplenomegaly.  EXTREMITIES: Full range of motion, no tenderness, good capillary refill.  SKIN: No rash, good turgor.  NEURO: Patient answers questions appropriately.  No focal deficits appreciated.           ED Course     Labs Reviewed   COMP METABOLIC PANEL (14) - Abnormal; Notable for the following components:       Result Value    Glucose 124 (*)      (*)      (*)     Alkaline Phosphatase 233 (*)     Globulin  4.6 (*)     A/G Ratio 0.8 (*)     All other components within normal limits   URINALYSIS WITH CULTURE REFLEX - Abnormal; Notable for the  following components:    Urobilinogen Urine 8 (*)     All other components within normal limits   CBC W/ DIFFERENTIAL - Abnormal; Notable for the following components:    HGB 10.3 (*)     HCT 33.9 (*)     MCV 73.7 (*)     MCH 22.4 (*)     MCHC 30.4 (*)     Lymphocyte Absolute 0.97 (*)     All other components within normal limits   LIPASE - Normal   POCT PREGNANCY URINE - Normal   CBC WITH DIFFERENTIAL WITH PLATELET    Narrative:     The following orders were created for panel order CBC With Differential With Platelet.  Procedure                               Abnormality         Status                     ---------                               -----------         ------                     CBC W/ DIFFERENTIAL[[id number]]          Abnormal            Final result                 Please view results for these tests on the individual orders.   RAINBOW DRAW LAVENDER   RAINBOW DRAW LIGHT GREEN             CT abdomen pelvis1. Low-attenuation lesion which appears to be exophytic inferiorly off of the tail the pancreas measuring 3.2 x by 3.2 by 3.5 cm.      2. Heterogeneously enhancing lesions within the liver which are indeterminate and should be further characterized using MRI with [facility].      3. Diverticulosis without evidence of diverticulitis.      4. Uterine fibroids.   Independently reviewed by myself, no free air         MDM      Patient given IV fluids.  Patient with Zofran.  Patient declines pain medicines.  Patient eventually did request something for pain and did not want narcotic was given Toradol.  Patient had no significant change in her pain.  Patient eventually was given Dilaudid as well.  Patient CT did have a lesion of the pancreas with liver lesions as well and elevated liver enzymes.  I did speak with GI Dr. ruiz who recommended admission for further imaging and evaluation.  Patient is comfortable with this plan.  I did consider pancreas mass with liver lesions, electrolyte abnormalities,  diverticulitis, bowel obstruction.  [facility]  Patient states her  shunt needs to be reset within 4 hours if she does have an MRI.  Patient's neurosurgeon is Dr. cordova                                 Medical Decision Making      Disposition and Plan     Clinical Impression:  1. Abdominal pain of unknown etiology    2. Pancreatic mass (HCC)    3. Liver masses    4. Elevated liver enzymes         Disposition:  There is no disposition on file for this visit.  There is no disposition time on file for this visit.    Follow-up:  No follow-up provider specified.        Medications Prescribed:  Current Discharge Medication List

## 2024-07-05 NOTE — H&P
[facility]     Honey Macias Patient Status:  Emergency    1985 MRN [id number]   [facility] EMERGENCY DEPARTMENT Attending Rainer Clemons MD   Hosp Day # 0 PCP Santos Espinosa MD     Chief Complaint: Abdominal pain    Subjective:    History of Present Illness:   Honey Macias is a 39 year old female with PMHx anxiety, depression, migraines, hypertension, DELLA, morbid obesity, CSF leak s/p patch and then s/p  shunt who presents to the hospital for evaluation of abdominal pain. She has had intermittent abdominal pain for the past 3 months but has been more severe and constant since last night. She describes it as a stabbing sensation in the todd-umbilical region. She has associated nausea and vomiting. She denies any melena or hematochezia. No fever, chills, chest pain, SOB or dysuria. BM change between constipation and diarrhea. No weight loss or night sweats.  In the ER, CT a/p shows pancreatic tail mass with liver lesions. Her LFTs are elevated with normal bilirubin.     History/Other:    Past Medical History:  Past Medical History:    Anxiety    Asthma (HCC)    Back problem    Depression    Diastolic dysfunction    Extrinsic asthma, unspecified    High blood pressure    Lymphadenitis    Migraines    Obesity    Pituitary tumor    Pulmonary hypertension (HCC)    Seizure disorder (HCC)    Shortness of breath    Sleep apnea     Past Surgical History:   Past Surgical History:   Procedure Laterality Date    Adenoidectomy      Other surgical history  13 [facility]     Incision and Drainage of Deep Left Axillary Abscess    Other surgical history  2023    ENDOSCOPIC ENDONASAL CEREBROSPINAL FLUID LEAK REPAIR WITH ABDOMINAL FAT GRAFT    Other surgical history      Other surgical history  02/10/2023    RIGHT OCCIPITAL VENTRICULOPERITONEAL SHUNT INSERTION    Tonsillectomy        Family History:   Family History   Problem Relation Age of Onset    Heart Disorder  Father     Hypertension Mother      Social History:    reports that she has never smoked. She has never been exposed to tobacco smoke. She has never used smokeless tobacco. She reports current alcohol use. She reports that she does not use drugs.     Allergies:   Allergies   Allergen Reactions    Bee Venom ANAPHYLAXIS    Triptans PALPITATIONS    Amoxicillin NAUSEA AND VOMITING and DIZZINESS    Penicillins NAUSEA AND VOMITING and DIZZINESS    Lisinopril Coughing and OTHER (SEE COMMENTS)     Medications:    No current facility-administered medications on file prior to encounter.     Current Outpatient Medications on File Prior to Encounter   Medication Sig Dispense Refill    topiramate 25 MG Oral Tab Take 3 tablets (75 mg total) by mouth 2 (two) times daily. 180 tablet 2    ubrogepant (UBRELVY) 50 MG Oral Tab Take one tablet at onset of migraine.  May take additional tablet in 2 hours if needed.  Do not exceed two tablets per 24 hour period. 10 tablet 5    amLODIPine 10 MG Oral Tab daily.      losartan 100 MG Oral Tab Take 1 tablet (100 mg total) by mouth daily.      hydroCHLOROthiazide 25 MG Oral Tab Take 1 tablet (25 mg total) by mouth daily.      metoprolol succinate ER 50 MG Oral Tablet 24 Hr Take 1 tablet (50 mg total) by mouth daily.       Review of Systems:   A comprehensive review of systems was completed.    Pertinent positives and negatives noted in the HPI.    Objective:   Physical Exam:    /72   Pulse 61   Temp 98.2 °F (36.8 °C) (Temporal)   Resp 18   Ht 5' 7\" (1.702 m)   Wt (!) 352 lb (159.7 kg)   LMP 06/23/2024 (Approximate)   SpO2 99%   BMI 55.13 kg/m²   General: No acute distress, awake and alert, obese  Respiratory: No rhonchi, no wheezes  Cardiovascular: S1, S2. Regular rate and rhythm  Abdomen: Soft, todd-umbilical, non-distended, positive bowel sounds  Neuro: COBB x 3  Extremities: No pitting edema    Results:    Labs:      Labs Last 24 Hours:  Recent Labs   Lab 07/05/24  1221   RBC  4.60   HGB 10.3*   HCT 33.9*   MCV 73.7*   MCH 22.4*   MCHC 30.4*   RDW 15.6   NEPRELIM 3.02   WBC 4.3   .0     Recent Labs   Lab 07/05/24  1221   *   BUN 18   CREATSERUM 1.01   EGFRCR 74   CA 8.8   ALB 3.6      K 3.9      CO2 23.0   ALKPHO 233*   *   *   BILT 0.9   TP 8.2     Lab Results   Component Value Date    INR 1.16 01/30/2023     No results for input(s): \"TROP\", \"TROPHS\", \"CK\" in the last 168 hours.    No results for input(s): \"TROP\", \"PBNP\" in the last 168 hours.    No results for input(s): \"PCT\" in the last 168 hours.    Imaging: Imaging data reviewed in Epic.    Assessment & Plan:      #Abdominal pain due to pancreatic tail mass with liver lesions  -Check AFP and CA 19-9  -GI consult  -Pain control, PRN antiemetics  -MRI Liver with Eovist    #Elevated LFTs due to above  -Trend   -Work up sent by GI    #Hx of CSF leak s/p patch and then s/p  shunt  -States  shunt needs to be reset within 4 hours if she has MRI  -NSGY consult, sees Dr. Yanez    #Morbid obesity  -BMI is 55.13    #Hypertension  -Resume home meds    #Migraines  -Sees Dr. Santos  -On Ubrelvy PRN (ok to bring home med)  -Topamax    #DELLA  -DELLA protocol    #Microcytic anemia  -Check iron studies    #Anxiety and depression    Plan of care discussed with patient, RN.    Eleazar Salmeron, DO    Supplementary Documentation:     The 21st Century Cures Act makes medical notes like these available to patients in the interest of transparency. Please be advised this is a medical document. Medical documents are intended to carry relevant information, facts as evident, and the clinical opinion of the practitioner. The medical note is intended as peer to peer communication and may appear blunt or direct. It is written in medical language and may contain abbreviations or verbiage that are unfamiliar.

## 2024-07-05 NOTE — CONSULTS
[facility]                       Gastroenterology Consultation-[facility]    Honey Macias Patient Status:  Emergency    1985 MRN [id number]   Location [facility] EMERGENCY DEPARTMENT Attending Rainer Clemons MD   Hosp Day # 0 PCP Santos Espinosa MD         Reason for consultation: abdominal pain, abnormal imaging  HPI: Ms Macias is a 38 yo F who presents with upper abdominal pain which started 3 months ago. The pain is constant but changes in severity. She reports a few episode of intense pain. She has had nausea, vomiting. She reports that her BMs alternate between constipation and diarrhea which is chronic. She denies unintentional weight loss. She denies fevers. She denies melena or hematochezia. She denies herbal medications.   PMHx:   Past Medical History:    Anxiety    Asthma (HCC)    Back problem    Depression    Diastolic dysfunction    Extrinsic asthma, unspecified    High blood pressure    Lymphadenitis    Migraines    Obesity    Pituitary tumor    Pulmonary hypertension (HCC)    Seizure disorder (HCC)    Shortness of breath    Sleep apnea     PSHx:   Past Surgical History:   Procedure Laterality Date    Adenoidectomy      Other surgical history  13 [facility]     Incision and Drainage of Deep Left Axillary Abscess    Other surgical history  2023    ENDOSCOPIC ENDONASAL CEREBROSPINAL FLUID LEAK REPAIR WITH ABDOMINAL FAT GRAFT    Other surgical history      Other surgical history  02/10/2023    RIGHT OCCIPITAL VENTRICULOPERITONEAL SHUNT INSERTION    Tonsillectomy       Medications:    [COMPLETED] ondansetron (Zofran) 4 MG/2ML injection 4 mg  4 mg Intravenous Once    sodium chloride 0.9% infusion  125 mL/hr Intravenous Continuous    [COMPLETED] ketorolac (Toradol) 15 MG/ML injection 15 mg  15 mg Intravenous Once    [COMPLETED] iopamidol 76% (ISOVUE-370) injection for power injector  100 mL Intravenous ONCE PRN    HYDROmorphone (Dilaudid) 1 MG/ML  injection 0.5 mg  0.5 mg Intravenous Q30 Min PRN     Allergies:   Allergies   Allergen Reactions    Bee Venom ANAPHYLAXIS    Triptans PALPITATIONS    Amoxicillin NAUSEA AND VOMITING and DIZZINESS    Penicillins NAUSEA AND VOMITING and DIZZINESS    Lisinopril Coughing and OTHER (SEE COMMENTS)     SocHx:  No history of smoking;  rare Etoh; The patient has no history of IV drug use or other illicit substances.  FamHx: The patient has no family history of colon cancer or other gastrointestinal malignancies;  aunt with pancreatic cancer  ROS:  In addition to the pertinent positives described above:            Infectious Disease:  No chronic infections or recent fevers prior to the acute illness            Cardiovascular: No history of CAD, prior MI, chest pain, or palpitations            Respiratory: asthma            Hematologic: + anemia            Dermatologic: The patient reports no recent rashes or chronic skin disorders            Rheumatologic: The patient reports no history of chronic arthritis, myalgias, arthralgias            Genitourinary:  The patient reports no history of recurrent urinary tract infections, hematuria, dysuria, or nephrolithiasis           Psychiatric: The patient reports no history of depression, anxiety, suicidal ideation, or homicidal ideation           Oncologic: The patient reports on history of prior solid tumor or hematologic malignancy           ENT: The patient reports no hoarseness of voice, hearing loss, sinus congestion, tinnitus           Neurologic: seizures (last one was 6 years ago)    PE: /66   Pulse 59   Temp 98.2 °F (36.8 °C) (Temporal)   Resp 18   Ht 5' 7\" (1.702 m)   Wt (!) 352 lb (159.7 kg)   LMP 06/23/2024 (Approximate)   SpO2 99%   BMI 55.13 kg/m²   Gen: AAO x 3, able to speak in complete sentences  HENT: NCAT, EOMI, PERRL, oropharynx is clear with moist mucosal membranes  Eyes: Sclerae are anicteric  Neck:  Supple without nuchal rigidity; No  lymphadenopathy  CV: Regular rate and rhythm, with normal S1 and S2; No S3; No murmurs  Resp: Clear to auscultation bilaterally without wheezes; rubs, rhonchi, or rales  Abdomen: Soft, +epigastric tenderness, non-distended with the presence of bowel sounds; No hepatosplenomegaly; no rebound or guarding; No ascites is clinically apparent; no tympany to percussion  Ext: No peripheral edema or cyanosis  Skin: Warm and dry  Psychiatric: Appropriate mood and congruent affect without obvious depression or anxiety  Labs:   Lab Results   Component Value Date    WBC 4.3 07/05/2024    HGB 10.3 07/05/2024    HCT 33.9 07/05/2024    .0 07/05/2024    CREATSERUM 1.01 07/05/2024    BUN 18 07/05/2024     07/05/2024    K 3.9 07/05/2024     07/05/2024    CO2 23.0 07/05/2024     07/05/2024    CA 8.8 07/05/2024    ALB 3.6 07/05/2024    ALKPHO 233 07/05/2024    BILT 0.9 07/05/2024     07/05/2024     07/05/2024    LIP 36 07/05/2024     Recent Labs   Lab 07/05/24  1221   *   BUN 18   CREATSERUM 1.01   CA 8.8      K 3.9      CO2 23.0     Recent Labs   Lab 07/05/24  1221   RBC 4.60   HGB 10.3*   HCT 33.9*   MCV 73.7*   MCH 22.4*   MCHC 30.4*   RDW 15.6   NEPRELIM 3.02   WBC 4.3   .0       Recent Labs   Lab 07/05/24  1221   *   *       Imaging:   CT A/p:  Impression   CONCLUSION:       1. Low-attenuation lesion which appears to be exophytic inferiorly off of the tail the pancreas measuring 3.2 x by 3.2 by 3.5 cm.     2. Heterogeneously enhancing lesions within the liver which are indeterminate and should be further characterized using MRI with Eovist.     3. Diverticulosis without evidence of diverticulitis.     4. Uterine fibroids.     Impression:   Epigastric pain: DDx includes secondary to pancreatic lesion, PUD, IBS, etc  Liver lesion and elevated LFTs: obtain laboratory work up and MRI liver  Microcytic Anemia    Recommendations:   MRI Liver with Eovist  PPI  BID  Obtain laboratory work up for elevated LFTs  Pancreatic elastase, fecal fat  Iron studies  Await above; if she has metastatic disease to liver, would obtain bx of liver lesion vs EUS with FNA of pancreatic lesion          Thank you for the consultation, we will follow the patient with you.    Winsome Guardado,   3:42 PM  7/5/2024  [facility]  [phone]

## 2024-07-05 NOTE — ED QUICK NOTES
Pt c/o generalized abdominal pain that started in the middle of the night. Pt denies any vomiting or diarrhea. Pt endorses nausea. Pain increases with drinking water and unable to eat. Tears came to pts eyes with palpation of abdomen.

## 2024-07-05 NOTE — PLAN OF CARE
Received pt from ER at 1715.  Abdo pain mild.  IVF's as ordered.  MRI screening done.  Seizure precautions, tele monitoring.  NPO til after MRI.  Pt verbalized understanding of POC.

## 2024-07-05 NOTE — ED INITIAL ASSESSMENT (HPI)
PT PRESENTS TO ED WITH ABDOMINAL PAIN THAT STARTED LAST NIGHT.  PT STATES IT KEEPS HAPPENING, STATES SHE KEEPS GETTING INTENSE STOMACH PAIN, WAKES HER UP OUT OF HER SLEEP, HURTS TO DRINK.  PAIN IS IN THE MIDDLE OF ABDOMEN.  FEELS LIKE IT IS STABBING AND THROBBING.  STATES SYMPTOMS HAVE BEEN INTERMITTENT FOR 3 MONTHS.  PT STATES SHE HAS INTERMITTENT N/V/D, AMBULATORY, A&OX4  
Yes

## 2024-07-06 LAB
ACTIN SMOOTH MUSCLE AB: 20 UNITS
ALBUMIN SERPL-MCNC: 3.2 G/DL (ref 3.4–5)
ALBUMIN/GLOB SERPL: 0.8 {RATIO} (ref 1–2)
ALP LIVER SERPL-CCNC: 187 U/L
ALT SERPL-CCNC: 231 U/L
ANION GAP SERPL CALC-SCNC: 4 MMOL/L (ref 0–18)
AST SERPL-CCNC: 105 U/L (ref 15–37)
BASOPHILS # BLD AUTO: 0.02 X10(3) UL (ref 0–0.2)
BASOPHILS NFR BLD AUTO: 0.4 %
BILIRUB SERPL-MCNC: 0.5 MG/DL (ref 0.1–2)
BUN BLD-MCNC: 16 MG/DL (ref 9–23)
CALCIUM BLD-MCNC: 8.5 MG/DL (ref 8.5–10.1)
CHLORIDE SERPL-SCNC: 109 MMOL/L (ref 98–112)
CO2 SERPL-SCNC: 26 MMOL/L (ref 21–32)
CREAT BLD-MCNC: 1.18 MG/DL
EGFRCR SERPLBLD CKD-EPI 2021: 61 ML/MIN/1.73M2 (ref 60–?)
EOSINOPHIL # BLD AUTO: 0.03 X10(3) UL (ref 0–0.7)
EOSINOPHIL NFR BLD AUTO: 0.6 %
ERYTHROCYTE [DISTWIDTH] IN BLOOD BY AUTOMATED COUNT: 15.9 %
GLOBULIN PLAS-MCNC: 3.9 G/DL (ref 2.8–4.4)
GLUCOSE BLD-MCNC: 111 MG/DL (ref 70–99)
HCT VFR BLD AUTO: 31.8 %
HGB BLD-MCNC: 9.2 G/DL
IMM GRANULOCYTES # BLD AUTO: 0.01 X10(3) UL (ref 0–1)
IMM GRANULOCYTES NFR BLD: 0.2 %
INR BLD: 1.11 (ref 0.8–1.2)
LYMPHOCYTES # BLD AUTO: 1.4 X10(3) UL (ref 1–4)
LYMPHOCYTES NFR BLD AUTO: 29.7 %
M2 MITOCHONDRIAL AB: <20 UNITS
MCH RBC QN AUTO: 22.1 PG (ref 26–34)
MCHC RBC AUTO-ENTMCNC: 28.9 G/DL (ref 31–37)
MCV RBC AUTO: 76.3 FL
MONOCYTES # BLD AUTO: 0.36 X10(3) UL (ref 0.1–1)
MONOCYTES NFR BLD AUTO: 7.6 %
NEUTROPHILS # BLD AUTO: 2.89 X10 (3) UL (ref 1.5–7.7)
NEUTROPHILS # BLD AUTO: 2.89 X10(3) UL (ref 1.5–7.7)
NEUTROPHILS NFR BLD AUTO: 61.5 %
OSMOLALITY SERPL CALC.SUM OF ELEC: 290 MOSM/KG (ref 275–295)
PLATELET # BLD AUTO: 361 10(3)UL (ref 150–450)
POTASSIUM SERPL-SCNC: 3.9 MMOL/L (ref 3.5–5.1)
PROT SERPL-MCNC: 7.1 G/DL (ref 6.4–8.2)
PROTHROMBIN TIME: 14.3 SECONDS (ref 11.6–14.8)
RBC # BLD AUTO: 4.17 X10(6)UL
SODIUM SERPL-SCNC: 139 MMOL/L (ref 136–145)
UFH PPP CHRO-ACNC: <0.1 IU/ML
WBC # BLD AUTO: 4.7 X10(3) UL (ref 4–11)

## 2024-07-06 PROCEDURE — 99233 SBSQ HOSP IP/OBS HIGH 50: CPT | Performed by: HOSPITALIST

## 2024-07-06 RX ORDER — HEPARIN SODIUM 5000 [USP'U]/ML
10000 INJECTION, SOLUTION INTRAVENOUS; SUBCUTANEOUS EVERY 8 HOURS SCHEDULED
Status: DISCONTINUED | OUTPATIENT
Start: 2024-07-06 | End: 2024-07-11

## 2024-07-06 NOTE — PLAN OF CARE
AOX4, VSS on RA, , tele NSR, NPO pending MRI/MRCP. PRN pain and antiemetics given as ordered. Seizure precautions in place.

## 2024-07-06 NOTE — PROGRESS NOTES
[facility]   part of [facility]     Hospitalist Progress Note     Honey Macias Patient Status:  Inpatient    1985 MRN [id number]   Location [facility] 3SW-A Attending Liv Beltre MD   Hosp Day # 1 PCP Santos Espinosa MD     Chief Complaint: Abdominal pain    Subjective:     Patient states abdominal pain about the same, denies nausea, vomiting.    Objective:    Review of Systems:   A comprehensive review of systems was completed; pertinent positive and negatives stated in subjective.    Vital signs:  Temp:  [98 °F (36.7 °C)-98.2 °F (36.8 °C)] 98 °F (36.7 °C)  Pulse:  [57-82] 60  Resp:  [16-18] 17  BP: (100-143)/(60-85) 135/62  SpO2:  [96 %-100 %] 97 %    Physical Exam:    General: No acute distress  Respiratory: No wheezes, no rhonchi  Cardiovascular: S1, S2, regular rate and rhythm  Abdomen: Soft, Non-tender, non-distended, positive bowel sounds  Neuro: No new focal deficits.   Extremities: No edema      Diagnostic Data:    Labs:  Recent Labs   Lab 24  1221 24  0530 24  0531   WBC 4.3 4.7  --    HGB 10.3* 9.2*  --    MCV 73.7* 76.3*  --    .0 361.0  --    INR  --   --  1.11       Recent Labs   Lab 24  1221 24  0530   * 111*   BUN 18 16   CREATSERUM 1.01 1.18*   CA 8.8 8.5   ALB 3.6 3.2*    139   K 3.9 3.9    109   CO2 23.0 26.0   ALKPHO 233* 187*   * 105*   * 231*   BILT 0.9 0.5   TP 8.2 7.1       Estimated Creatinine Clearance: 63.5 mL/min (A) (based on SCr of 1.18 mg/dL (H)).    No results for input(s): \"TROP\", \"TROPHS\", \"CK\" in the last 168 hours.    Recent Labs   Lab 24  0531   PTP 14.3   INR 1.11                  Microbiology    No results found for this visit on 24.      Imaging: Reviewed in Epic.    Medications:    pantoprazole  40 mg Intravenous Q12H    amLODIPine  10 mg Oral Nightly    hydroCHLOROthiazide  25 mg Oral Nightly    losartan  100 mg Oral Nightly    metoprolol succinate ER  50 mg Oral  Nightly    topiramate  75 mg Oral BID    heparin  7,500 Units Subcutaneous Q8H REX    sodium ferric gluconate  125 mg Intravenous Daily       Assessment & Plan:      #Abdominal pain due to pancreatic tail mass with liver lesions  -AFP and CA 19-9 wnl  -GI consult  -Pain control, PRN antiemetics  -MRI Liver with Eovist pending     #Elevated LFTs due to above  -Trend, lower today  -Work up sent by GI     #Hx of CSF leak s/p patch and then s/p  shunt  -States  shunt needs to be reset within 4 hours if she has MRI  -NSGY consult, sees Dr. Yanez     #Morbid obesity  -BMI is 55.13     #Hypertension  -Resume home meds  -controlled     #Migraines  -Sees Dr. Santos  -On Ubrelvy PRN (ok to bring home med)  -Topamax     #DELLA  -DELLA protocol     #Microcytic anemia  -Check iron studies    #Anxiety and depression         Liv Beltre MD    Supplementary Documentation:     Quality:  DVT Mechanical Prophylaxis:   SCDs,    DVT Pharmacologic Prophylaxis   Medication    heparin (Porcine) 5000 UNIT/ML injection 7,500 Units                Code Status: Full Code  Marrufo: No urinary catheter in place  Marrufo Duration (in days):   Central line:    LAURA:     Discharge is dependent on: progress  At this point Ms. Macias is expected to be discharge to: home    The 21st Century Cures Act makes medical notes like these available to patients in the interest of transparency. Please be advised this is a medical document. Medical documents are intended to carry relevant information, facts as evident, and the clinical opinion of the practitioner. The medical note is intended as peer to peer communication and may appear blunt or direct. It is written in medical language and may contain abbreviations or verbiage that are unfamiliar.

## 2024-07-06 NOTE — PROGRESS NOTES
Spoke to MRI dept. Pt will not have imaging until Monday. Will need to be NPO Sun/Mon at [facility].    Updated Dr Miramontes. Ok for pt to have CLD now. Will be NPO 7/8/24 @ 0000.

## 2024-07-06 NOTE — PROGRESS NOTES
[facility] Progress Note    CC: abd pain, abnormal imaging  S: Patient with improved abdominal pain at this time.  O: /62 (BP Location: Right arm)   Pulse 60   Temp 98 °F (36.7 °C) (Oral)   Resp 17   Ht 5' 7\" (1.702 m)   Wt (!) 352 lb (159.7 kg)   LMP 06/23/2024 (Approximate)   SpO2 97%   BMI 55.13 kg/m²     Gen: NAD  Abd: (+)BS, soft, non-tender, non-distended; no rebound or guarding    Laboratory/Imaging Data:     No results for input(s): \"PGLU\" in the last 168 hours.  Recent Labs   Lab 07/06/24  0531   INR 1.11         Recent Labs   Lab 07/05/24  1221 07/06/24  0530   WBC 4.3 4.7   HGB 10.3* 9.2*   .0 361.0       Recent Labs   Lab 07/05/24  1221 07/06/24  0530    139   K 3.9 3.9    109   CO2 23.0 26.0   BUN 18 16   CREATSERUM 1.01 1.18*       Recent Labs   Lab 07/05/24  1221 07/06/24  0530   * 231*   * 105*       Assessment/Plan: 40 yo F who presents with upper abdominal pain.  CT abdomen pelvis with low-attenuation lesion of the pancreatic tail along with multiple enhancing lesions of the liver.  Patient also with elevated LFTs which are downtrending at this time.  Patient also with iron deficiency anemia with hemoglobin 9.2 with no active bleeding.    -MRCP/MR liver pending  -Chronic liver disease workup negative/pending  -Can consider bidirectional endoscopy pending clinical progression and MRI findings  -Daily LFTs    Chase Miramontes MD, 07/06/24, 8:31 AM  [facility]

## 2024-07-06 NOTE — PLAN OF CARE
Pt A&O. On room air. Tele and  monitoring maintained. Seizure precautions maintained. Last seizure 5 yrs ago per pt report. Scds to BLE. Tolerating CLD. Last BM 7/3/24. Awaiting stool sample. Voiding w/o difficulty. Denies need for pain medications at this time. Pt reminded to \"call; don't fall\". Up independently in room. IVFs infusing. Awaiting MRI/MRCP. Per MRI dept, won't be done until Monday. NS still to see. Pt plans to be dc'd home when cleared. Pt's significant other, Memo, at bedside earlier today and updated with plan of care.

## 2024-07-07 LAB
ALBUMIN SERPL-MCNC: 3.2 G/DL (ref 3.4–5)
ALBUMIN/GLOB SERPL: 0.8 {RATIO} (ref 1–2)
ALP LIVER SERPL-CCNC: 165 U/L
ALT SERPL-CCNC: 143 U/L
ANION GAP SERPL CALC-SCNC: 4 MMOL/L (ref 0–18)
AST SERPL-CCNC: 31 U/L (ref 15–37)
BILIRUB SERPL-MCNC: 0.4 MG/DL (ref 0.1–2)
BUN BLD-MCNC: 9 MG/DL (ref 9–23)
CALCIUM BLD-MCNC: 8.9 MG/DL (ref 8.5–10.1)
CHLORIDE SERPL-SCNC: 108 MMOL/L (ref 98–112)
CO2 SERPL-SCNC: 26 MMOL/L (ref 21–32)
CREAT BLD-MCNC: 0.94 MG/DL
EGFRCR SERPLBLD CKD-EPI 2021: 80 ML/MIN/1.73M2 (ref 60–?)
ERYTHROCYTE [DISTWIDTH] IN BLOOD BY AUTOMATED COUNT: 15.9 %
GLOBULIN PLAS-MCNC: 3.9 G/DL (ref 2.8–4.4)
GLUCOSE BLD-MCNC: 78 MG/DL (ref 70–99)
HCT VFR BLD AUTO: 31.7 %
HGB BLD-MCNC: 9.4 G/DL
MCH RBC QN AUTO: 22.2 PG (ref 26–34)
MCHC RBC AUTO-ENTMCNC: 29.7 G/DL (ref 31–37)
MCV RBC AUTO: 74.8 FL
OSMOLALITY SERPL CALC.SUM OF ELEC: 284 MOSM/KG (ref 275–295)
PLATELET # BLD AUTO: 343 10(3)UL (ref 150–450)
POTASSIUM SERPL-SCNC: 3.6 MMOL/L (ref 3.5–5.1)
PROT SERPL-MCNC: 7.1 G/DL (ref 6.4–8.2)
RBC # BLD AUTO: 4.24 X10(6)UL
SODIUM SERPL-SCNC: 138 MMOL/L (ref 136–145)
UFH PPP CHRO-ACNC: <0.1 IU/ML
UFH PPP CHRO-ACNC: <0.1 IU/ML
WBC # BLD AUTO: 4.8 X10(3) UL (ref 4–11)

## 2024-07-07 PROCEDURE — 99232 SBSQ HOSP IP/OBS MODERATE 35: CPT | Performed by: HOSPITALIST

## 2024-07-07 NOTE — PROGRESS NOTES
[facility]   part of [facility]     Hospitalist Progress Note     Honey Macias Patient Status:  Inpatient    1985 MRN [id number]   Location [facility] 3SW-A Attending Liv Beltre MD   Hosp Day # 2 PCP Santos Espinosa MD     Chief Complaint: Abdominal pain    Subjective:     Patient states abdominal pain improved, denies nausea, vomiting.    Objective:    Review of Systems:   A comprehensive review of systems was completed; pertinent positive and negatives stated in subjective.    Vital signs:  Temp:  [97.9 °F (36.6 °C)-99 °F (37.2 °C)] 98.8 °F (37.1 °C)  Pulse:  [60-79] 62  Resp:  [16-18] 17  BP: ()/(52-78) 120/70  SpO2:  [94 %-100 %] 94 %    Physical Exam:    General: No acute distress  Respiratory: No wheezes, no rhonchi  Cardiovascular: S1, S2, regular rate and rhythm  Abdomen: Soft, Non-tender, non-distended, positive bowel sounds  Neuro: No new focal deficits.   Extremities: No edema      Diagnostic Data:    Labs:  Recent Labs   Lab 24  1221 24  0530 24  0531   WBC 4.3 4.7  --    HGB 10.3* 9.2*  --    MCV 73.7* 76.3*  --    .0 361.0  --    INR  --   --  1.11       Recent Labs   Lab 24  1221 24  0530   * 111*   BUN 18 16   CREATSERUM 1.01 1.18*   CA 8.8 8.5   ALB 3.6 3.2*    139   K 3.9 3.9    109   CO2 23.0 26.0   ALKPHO 233* 187*   * 105*   * 231*   BILT 0.9 0.5   TP 8.2 7.1       Estimated Creatinine Clearance: 63.5 mL/min (A) (based on SCr of 1.18 mg/dL (H)).    No results for input(s): \"TROP\", \"TROPHS\", \"CK\" in the last 168 hours.    Recent Labs   Lab 24  0531   PTP 14.3   INR 1.11                  Microbiology    No results found for this visit on 24.      Imaging: Reviewed in Epic.    Medications:    heparin  10,000 Units Subcutaneous Q8H REX    pantoprazole  40 mg Intravenous Q12H    amLODIPine  10 mg Oral Nightly    hydroCHLOROthiazide  25 mg Oral Nightly    losartan  100 mg Oral Nightly     metoprolol succinate ER  50 mg Oral Nightly    topiramate  75 mg Oral BID    sodium ferric gluconate  125 mg Intravenous Daily       Assessment & Plan:      #Abdominal pain due to pancreatic tail mass with liver lesions  -AFP and CA 19-9 wnl  -GI consult, PPi BID, pancreatic elastase, fecal fat  -Pain control, PRN antiemetics  -MRI Liver with Eovist pending     #Elevated LFTs due to above  -Trend, lower yesterday, repeat pending today  -Work up sent by GI     #Hx of CSF leak s/p patch and then s/p  shunt  -States  shunt needs to be reset within 4 hours if she has MRI  -NSGY consult, sees Dr. Yanez     #Morbid obesity  -BMI is 55.13     #Hypertension  -Resume home meds  -controlled     #Migraines  -Sees Dr. Santos  -On Ubrelvy PRN (ok to bring home med)  -Topamax     #DELLA  -DELLA protocol     #Microcytic anemia  -c/w DARLING    #Anxiety and depression         Liv Belrte MD    Supplementary Documentation:     Quality:  DVT Mechanical Prophylaxis:   SCDs,    DVT Pharmacologic Prophylaxis   Medication    heparin (Porcine) 5000 UNIT/ML injection 10,000 Units                Code Status: Full Code  Marrufo: No urinary catheter in place  Marurfo Duration (in days):   Central line:    LAURA:     Discharge is dependent on: progress  At this point Ms. Macias is expected to be discharge to: home    The 21st Century Cures Act makes medical notes like these available to patients in the interest of transparency. Please be advised this is a medical document. Medical documents are intended to carry relevant information, facts as evident, and the clinical opinion of the practitioner. The medical note is intended as peer to peer communication and may appear blunt or direct. It is written in medical language and may contain abbreviations or verbiage that are unfamiliar.

## 2024-07-07 NOTE — PROGRESS NOTES
Pharmacy Progress Note:  Anticoagulation Dose Adjustment     Honey Macias is a 39 year old female on heparin for VTE prophylaxis.  Anti-factor Xa levels are being monitored due to the patient's high risk status of obesity.    Relevant labs:    Body mass index is 55.13 kg/m².    Wt Readings from Last 1 Encounters:   07/05/24 (!) 159.7 kg (352 lb)       Lab Results   Component Value Date    CREATSERUM 1.18 (H) 07/06/2024       Heparin Anti Xa Assay   Date Value Ref Range Status   07/06/2024 <0.10 IU/mL Final       Anti Xa level being assessed:  < 0.10 units/mL (Peak drawn 4 hours after dose).  Goal Peak Anti-Xa level:  (Heparin prophylaxis: 0.1-0.3 unit/mL).  Goal Trough Anti-Xa level: </=0.5 unit/mL  (when applicable)    Based on the above, heparin dose will be adjusted to 10,000 units Q8h, to begin at 2200 on 7/6 . Next Anti-factor Xa Peak drawn 4 hours after dose will be ordered on 7/7 @1000 .    Thank you,  Reyna Bloom, PharmD  7/6/2024 7:34 PM

## 2024-07-07 NOTE — PROGRESS NOTES
[facility] Gastroenterology Progress Note    CC: abd pain, abnormal imaging  S: Patient with stable abdominal pain at this time.  O: /70 (BP Location: Right arm)   Pulse 62   Temp 98.8 °F (37.1 °C) (Axillary)   Resp 17   Ht 5' 7\" (1.702 m)   Wt (!) 352 lb (159.7 kg)   LMP 06/23/2024 (Approximate)   SpO2 94%   BMI 55.13 kg/m²     Gen: NAD  Abd: (+)BS, soft, non-tender, non-distended; no rebound or guarding    Laboratory/Imaging Data:     No results for input(s): \"PGLU\" in the last 168 hours.  Recent Labs   Lab 07/06/24  0531   INR 1.11         Recent Labs   Lab 07/05/24  1221 07/06/24  0530   WBC 4.3 4.7   HGB 10.3* 9.2*   .0 361.0       Recent Labs   Lab 07/05/24  1221 07/06/24  0530    139   K 3.9 3.9    109   CO2 23.0 26.0   BUN 18 16   CREATSERUM 1.01 1.18*       Recent Labs   Lab 07/05/24  1221 07/06/24  0530   * 231*   * 105*       Assessment/Plan: 40 yo F who presents with upper abdominal pain.  CT abdomen pelvis with low-attenuation lesion of the pancreatic tail along with multiple enhancing lesions of the liver.  Patient also with elevated LFTs which are downtrending at this time.  Patient also with iron deficiency anemia with hemoglobin 9.4 with no active bleeding.    -MRCP/MR liver pending  -Chronic liver disease workup negative/pending  -Can consider bidirectional endoscopy pending clinical progression and MRI findings  -Daily LFTs    Chase Miramontes MD, 07/07/24, 8:55 AM  [facility]

## 2024-07-07 NOTE — PLAN OF CARE
Alert and Oriented x4. On RA, hx of DELLA with no CPAP. VSS. Tele-NS. Denies pain at this time. Denies N/T. Voiding freely. Tolerating diet. Denies N/V. Call light within reach at this time.    Plan: MRI/MRCP abdomen to be done Monday

## 2024-07-07 NOTE — PLAN OF CARE
Pt A&O. On room air. Tele and  monitoring maintained. Seizure precautions maintained. Last seizure 5 yrs ago per pt report. Scds to BLE. Tolerating CLD. Had BM today. Stool specimen sent.. Voiding w/o difficulty. Denies need for pain medications at this time. Pt reminded to \"call; don't fall\". Up independently in room. IVFs infusing. Awaiting MRI/MRCP. Per MRI dept, won't be done until Monday. NS still to see. Pt plans to be dc'd home when cleared. Pt's significant other, Memo, at bedside earlier today and updated with plan of care.

## 2024-07-08 ENCOUNTER — APPOINTMENT (OUTPATIENT)
Dept: MRI IMAGING | Facility: HOSPITAL | Age: 39
End: 2024-07-08
Attending: INTERNAL MEDICINE
Payer: MEDICAID

## 2024-07-08 LAB
ALBUMIN SERPL-MCNC: 3.4 G/DL (ref 3.4–5)
ALBUMIN/GLOB SERPL: 0.8 {RATIO} (ref 1–2)
ALP LIVER SERPL-CCNC: 153 U/L
ALT SERPL-CCNC: 99 U/L
ANION GAP SERPL CALC-SCNC: 8 MMOL/L (ref 0–18)
AST SERPL-CCNC: 14 U/L (ref 15–37)
BILIRUB DIRECT SERPL-MCNC: 0.2 MG/DL (ref 0–0.2)
BILIRUB SERPL-MCNC: 0.4 MG/DL (ref 0.1–2)
BUN BLD-MCNC: 8 MG/DL (ref 9–23)
CALCIUM BLD-MCNC: 8.8 MG/DL (ref 8.5–10.1)
CHLORIDE SERPL-SCNC: 107 MMOL/L (ref 98–112)
CO2 SERPL-SCNC: 25 MMOL/L (ref 21–32)
CREAT BLD-MCNC: 1.03 MG/DL
EGFRCR SERPLBLD CKD-EPI 2021: 72 ML/MIN/1.73M2 (ref 60–?)
GLOBULIN PLAS-MCNC: 4.1 G/DL (ref 2.8–4.4)
GLUCOSE BLD-MCNC: 91 MG/DL (ref 70–99)
OSMOLALITY SERPL CALC.SUM OF ELEC: 288 MOSM/KG (ref 275–295)
POTASSIUM SERPL-SCNC: 3.6 MMOL/L (ref 3.5–5.1)
PROT SERPL-MCNC: 7.5 G/DL (ref 6.4–8.2)
SODIUM SERPL-SCNC: 140 MMOL/L (ref 136–145)

## 2024-07-08 PROCEDURE — 74183 MRI ABD W/O CNTR FLWD CNTR: CPT | Performed by: INTERNAL MEDICINE

## 2024-07-08 PROCEDURE — 76376 3D RENDER W/INTRP POSTPROCES: CPT | Performed by: INTERNAL MEDICINE

## 2024-07-08 PROCEDURE — 99232 SBSQ HOSP IP/OBS MODERATE 35: CPT | Performed by: HOSPITALIST

## 2024-07-08 NOTE — PROGRESS NOTES
1630: Pt arrived back to floor from MRI.   1639: Kirstie GARAY with neurosurgery paged that patient arrived back to floor from MRI, pt to have  shunt restarted.  1719: Dr. Yanez at bedside to restart  shunt.

## 2024-07-08 NOTE — PLAN OF CARE
Alert and Oriented x4. On RA, hx of DELLA with no CPAP. VSS. Tele-NS. Denies pain at this time. Denies N/T. Voiding freely. NPO. Denies N/V. Call light within reach at this time.     Plan: NPO now for MRI/MRCP abdomen to be done in AM

## 2024-07-08 NOTE — PROGRESS NOTES
Inpatient Follow up Note    Honey Macias Patient Status:  Inpatient    1985 MRN [id number]   Location [facility] 3SW-A Attending Liv Beltre MD   Hosp Day # 3 PCP Santos Espinosa MD     Reason for Consultation   Abdominal pain, abnormal imaging     Subjective   Feels better today compared to yesterday, less abdominal pain. No vomiting, GI bleeding. Reports getting heavy periods every month.             Objective:     /51 (BP Location: Right arm)   Pulse 60   Temp 98.4 °F (36.9 °C) (Oral)   Resp 14   Ht 5' 7\" (1.702 m)   Wt (!) 352 lb (159.7 kg)   LMP 2024 (Approximate)   SpO2 94%   BMI 55.13 kg/m²   Gen: AAOx3  CV: RRR with normal S1 / S2  Resp: CTA bilaterally  Abd: (+)BS, soft, non-tender, non-distended; no rebound or guarding  Ext: No edema or cyanosis  Skin: Warm and dry     Labs/Imaging     LABRCNTIP[PGLU:5@  Recent Labs   Lab 24  0531   INR 1.11         Recent Labs   Lab 24  1221 24  0530 24  1311   WBC 4.3 4.7 4.8   HGB 10.3* 9.2* 9.4*   .0 361.0 343.0       Recent Labs   Lab 24  1221 24  0530 24  1311    139 138   K 3.9 3.9 3.6    109 108   CO2 23.0 26.0 26.0   BUN 18 16 9   CREATSERUM 1.01 1.18* 0.94       Recent Labs   Lab 24  1221 24  0530 24  1311   * 231* 143*   * 105* 31     CT ABDOMEN+PELVIS(CONTRAST ONLY)(CPT=74177)    Result Date: 2024  CONCLUSION:   1. Low-attenuation lesion which appears to be exophytic inferiorly off of the tail the pancreas measuring 3.2 x by 3.2 by 3.5 cm.  2. Heterogeneously enhancing lesions within the liver which are indeterminate and should be further characterized using MRI with Eovist.  3. Diverticulosis without evidence of diverticulitis.  4. Uterine fibroids.  Findings were discussed with Dr. Clemons by phone at 1500 hours on 2024.   LOCATION:  [place]   Dictated by (CST): Salvador Urrutia MD on 2024 at 2:50 PM     Finalized  by (CST): Salvador Urrutia MD on 7/05/2024 at 3:03 PM      AST   Date/Time Value Ref Range Status   07/07/2024 01:11 PM 31 15 - 37 U/L Final   09/04/2013 03:11 PM 7 (L) 15 - 41 U/L Final     ALT   Date/Time Value Ref Range Status   07/07/2024 01:11  (H) 13 - 56 U/L Final   09/04/2013 03:11 PM 22 14 - 54 U/L Final     BUN   Date/Time Value Ref Range Status   07/07/2024 01:11 PM 9 9 - 23 mg/dL Final   09/07/2013 05:56 AM 12 8 - 20 mg/dL Final              Assessment   38 yo F who presents with upper abdominal pain. CT abdomen pelvis with low-attenuation lesion of the pancreatic tail along with multiple enhancing lesions of the liver.  Patient also with elevated LFTs which are downtrending at this time. Patient also with iron deficiency anemia with hemoglobin 9.4 with no active bleeding.         Plan   -MRCP/MR liver ordered  -Chronic liver disease workup negative/pending  -Can consider bidirectional endoscopy pending clinical progression and MRI findings  -Daily LFTs       Nicko Mata MD  10:09 AM  7/8/2024  [facility] Gastroenterology  [phone]

## 2024-07-08 NOTE — PROGRESS NOTES
Notified of Neurosurgery consult.   Will wait for completion of MRI to re program VPS.   She is known to our practice and her Medtronic VPS was previously set at 1.5.    Full consult to follow.    Kirstie Braxton, JARROD

## 2024-07-08 NOTE — PROCEDURES
The patient's shunt was reprogrammed to 1.5 following MRI.    She reports stable headaches and vision, and no recurrent leak.

## 2024-07-08 NOTE — PROGRESS NOTES
[facility]   part of [facility]     Hospitalist Progress Note     Honey Macias Patient Status:  Inpatient    1985 MRN [id number]   [facility] 3SW-A Attending Liv Beltre MD   Hosp Day # 3 PCP Santos Espinosa MD     Chief Complaint: Abdominal pain    Subjective:     Patient states abdominal pain improved, denies nausea, vomiting, awaiting MRI.    Objective:    Review of Systems:   A comprehensive review of systems was completed; pertinent positive and negatives stated in subjective.    Vital signs:  Temp:  [98 °F (36.7 °C)-98.9 °F (37.2 °C)] 98.4 °F (36.9 °C)  Pulse:  [60-71] 60  Resp:  [14-20] 14  BP: (104-114)/(36-61) 114/51  SpO2:  [94 %-100 %] 94 %    Physical Exam:    General: No acute distress  Respiratory: No wheezes, no rhonchi  Cardiovascular: S1, S2, regular rate and rhythm  Abdomen: Soft, Non-tender, non-distended, positive bowel sounds  Neuro: No new focal deficits.   Extremities: No edema      Diagnostic Data:    Labs:  Recent Labs   Lab 24  1221 24  0530 24  0531 24  1311   WBC 4.3 4.7  --  4.8   HGB 10.3* 9.2*  --  9.4*   MCV 73.7* 76.3*  --  74.8*   .0 361.0  --  343.0   INR  --   --  1.11  --        Recent Labs   Lab 24  1221 24  0530 24  1311   * 111* 78   BUN 18 16 9   CREATSERUM 1.01 1.18* 0.94   CA 8.8 8.5 8.9   ALB 3.6 3.2* 3.2*    139 138   K 3.9 3.9 3.6    109 108   CO2 23.0 26.0 26.0   ALKPHO 233* 187* 165*   * 105* 31   * 231* 143*   BILT 0.9 0.5 0.4   TP 8.2 7.1 7.1       Estimated Creatinine Clearance: 79.7 mL/min (based on SCr of 0.94 mg/dL).    No results for input(s): \"TROP\", \"TROPHS\", \"CK\" in the last 168 hours.    Recent Labs   Lab 24  0531   PTP 14.3   INR 1.11                  Microbiology    No results found for this visit on 24.      Imaging: Reviewed in Epic.    Medications:    heparin  10,000 Units Subcutaneous Q8H REX    pantoprazole  40 mg  Intravenous Q12H    amLODIPine  10 mg Oral Nightly    hydroCHLOROthiazide  25 mg Oral Nightly    losartan  100 mg Oral Nightly    metoprolol succinate ER  50 mg Oral Nightly    topiramate  75 mg Oral BID    sodium ferric gluconate  125 mg Intravenous Daily       Assessment & Plan:      #Abdominal pain due to pancreatic tail mass with liver lesions  -AFP and CA 19-9 wnl  -GI consult, PPi BID, pancreatic elastase, fecal fat  -Pain control, PRN antiemetics  -MRI Liver with Eovist pending     #Elevated LFTs due to above  -Trend, lower yesterday, repeat pending today  -Work up sent by GI     #Hx of CSF leak s/p patch and then s/p  shunt  -States  shunt needs to be reset within 4 hours if she has MRI  -NSGY consult, sees Dr. Yanez     #Morbid obesity  -BMI is 55.13     #Hypertension  -Resume home meds  -controlled     #Migraines  -Sees Dr. Santos  -On Ubrelvy PRN (ok to bring home med)  -Topamax     #DELLA  -DELLA protocol     #Microcytic anemia  -c/w DARLING    #Anxiety and depression         Liv Beltre MD    Supplementary Documentation:     Quality:  DVT Mechanical Prophylaxis:   SCDs,    DVT Pharmacologic Prophylaxis   Medication    heparin (Porcine) 5000 UNIT/ML injection 10,000 Units                Code Status: Full Code  Marrufo: No urinary catheter in place  Marrufo Duration (in days):   Central line:    LAURA:     Discharge is dependent on: progress  At this point Ms. Macias is expected to be discharge to: home    The 21st Century Cures Act makes medical notes like these available to patients in the interest of transparency. Please be advised this is a medical document. Medical documents are intended to carry relevant information, facts as evident, and the clinical opinion of the practitioner. The medical note is intended as peer to peer communication and may appear blunt or direct. It is written in medical language and may contain abbreviations or verbiage that are unfamiliar.

## 2024-07-08 NOTE — CONSULTS
[facility]  Neurosurgery Consult    Honey Macias Patient Status:  Inpatient    1985 MRN [id number]   Location [facility] 3SW-A Attending Liv Beltre MD   Hosp Day # 3 PCP Santos Espinosa MD     REASON FOR CONSULTATION:  VPS reprogramming    HISTORY OF PRESENT ILLNESS:  Honey Macias is a(n) 39 year old female with PMH of CSF leak repair with fat graft and LD placement on 2/3/23 with Dr. Quiñonez, and right  Shunt placement on 2/10/23, anxiety, depression, migraines, DELLA, obesity who presented to the hospital on 24 with c/o severe abdominal pain. As part of her work up, she is planned to have an MRI and Neurosurgery was consulted to re-program shunt following imaging.     At her last office visit, her shunt was re-programmed to 1.5.    PAST MEDICAL HISTORY:  Past Medical History:    Anxiety    Asthma (HCC)    Back problem    Depression    Diastolic dysfunction    Extrinsic asthma, unspecified    High blood pressure    Lymphadenitis    Migraines    Obesity    Pituitary tumor    Pulmonary hypertension (HCC)    Seizure disorder (HCC)    Shortness of breath    Sleep apnea       PAST SURGICAL HISTORY:  Past Surgical History:   Procedure Laterality Date    Adenoidectomy      Other surgical history  13 [facility]     Incision and Drainage of Deep Left Axillary Abscess    Other surgical history  2023    ENDOSCOPIC ENDONASAL CEREBROSPINAL FLUID LEAK REPAIR WITH ABDOMINAL FAT GRAFT    Other surgical history      Other surgical history  02/10/2023    RIGHT OCCIPITAL VENTRICULOPERITONEAL SHUNT INSERTION    Tonsillectomy         FAMILY HISTORY:  family history includes Heart Disorder in her father; Hypertension in her mother.    SOCIAL HISTORY:   reports that she has never smoked. She has never been exposed to tobacco smoke. She has never used smokeless tobacco. She reports current alcohol use. She reports that she does not use drugs.    ALLERGIES:  Allergies   Allergen Reactions    Bee  Venom ANAPHYLAXIS    Triptans PALPITATIONS    Amoxicillin NAUSEA AND VOMITING and DIZZINESS    Penicillins NAUSEA AND VOMITING and DIZZINESS    Lisinopril Coughing and OTHER (SEE COMMENTS)       MEDICATIONS:  Medications Prior to Admission   Medication Sig Dispense Refill Last Dose    topiramate 25 MG Oral Tab Take 3 tablets (75 mg total) by mouth 2 (two) times daily. 180 tablet 2 7/4/2024 at 2100    ubrogepant (UBRELVY) 50 MG Oral Tab Take one tablet at onset of migraine.  May take additional tablet in 2 hours if needed.  Do not exceed two tablets per 24 hour period. 10 tablet 5 Past Week    amLODIPine 10 MG Oral Tab daily.   7/4/2024 at 2100    losartan 100 MG Oral Tab Take 1 tablet (100 mg total) by mouth daily.   7/4/2024 at 2100    hydroCHLOROthiazide 25 MG Oral Tab Take 1 tablet (25 mg total) by mouth daily.   7/4/2024 at 2100    metoprolol succinate ER 50 MG Oral Tablet 24 Hr Take 1 tablet (50 mg total) by mouth daily.   7/4/2024 at 2100     Current Facility-Administered Medications   Medication Dose Route Frequency    ubrogepant (Ubrelvy) TABS 50 mg (Patient Supplied)  50 mg Oral BID PRN    heparin (Porcine) 5000 UNIT/ML injection 10,000 Units  10,000 Units Subcutaneous Q8H REX    pantoprazole (Protonix) 40 mg in sodium chloride 0.9% PF 10 mL IV push  40 mg Intravenous Q12H    amLODIPine (Norvasc) tab 10 mg  10 mg Oral Nightly    hydroCHLOROthiazide tab 25 mg  25 mg Oral Nightly    losartan (Cozaar) tab 100 mg  100 mg Oral Nightly    metoprolol succinate ER (Toprol XL) 24 hr tab 50 mg  50 mg Oral Nightly    topiramate (TopaMAX) tab 75 mg  75 mg Oral BID    HYDROmorphone (Dilaudid) 1 MG/ML injection 0.2 mg  0.2 mg Intravenous Q2H PRN    Or    HYDROmorphone (Dilaudid) 1 MG/ML injection 0.4 mg  0.4 mg Intravenous Q2H PRN    Or    HYDROmorphone (Dilaudid) 1 MG/ML injection 0.8 mg  0.8 mg Intravenous Q2H PRN    acetaminophen (Tylenol Extra Strength) tab 500 mg  500 mg Oral Q4H PRN    melatonin tab 3 mg  3 mg Oral  Nightly PRN    polyethylene glycol (PEG 3350) (Miralax) 17 g oral packet 17 g  17 g Oral Daily PRN    sennosides (Senokot) tab 17.2 mg  17.2 mg Oral Nightly PRN    bisacodyl (Dulcolax) 10 MG rectal suppository 10 mg  10 mg Rectal Daily PRN    fleet enema (Fleet) 7-19 GM/118ML rectal enema 133 mL  1 enema Rectal Once PRN    ondansetron (Zofran) 4 MG/2ML injection 4 mg  4 mg Intravenous Q6H PRN    prochlorperazine (Compazine) 10 MG/2ML injection 5 mg  5 mg Intravenous Q8H PRN    traMADol (Ultram) tab 50 mg  50 mg Oral Q6H PRN    benzonatate (Tessalon) cap 200 mg  200 mg Oral TID PRN    guaiFENesin (Robitussin) 100 MG/5 ML oral liquid 200 mg  200 mg Oral Q4H PRN    glycerin-hypromellose- (Artificial Tears) 0.2-0.2-1 % ophthalmic solution 1 drop  1 drop Both Eyes QID PRN    sodium chloride (Saline Mist) 0.65 % nasal solution 1 spray  1 spray Each Nare Q3H PRN    sodium ferric gluconate (Ferrlecit) 125 mg in sodium chloride 0.9% 100mL IVPB premix  125 mg Intravenous Daily       REVIEW OF SYSTEMS:  A 10-point system was reviewed.  Pertinent positives and negatives are noted in HPI.      PHYSICAL EXAMINATION:  VITAL SIGNS: /51 (BP Location: Right arm)   Pulse 60   Temp 98.4 °F (36.9 °C) (Oral)   Resp 14   Ht 67\"   Wt (!) 352 lb (159.7 kg)   LMP 06/23/2024 (Approximate)   SpO2 94%   BMI 55.13 kg/m²   GENERAL:  Patient is a 39 year old female in no acute distress.  HEENT:  Normocephalic, atraumatic.  NEUROLOGICAL:  This patient is alert and orientated x 3.  Speech fluent. Comprehension intact.   PERRLA +3 brisk,  EOMI.  Face is symmetrical. CN's GI.  Sensation to light touch is intact bilaterally.  No Pronator Drift.  Strengths 5/5 bilaterally. Finger-to-nose coordination is intact.  Gait deferred.      DIAGNOSTIC DATA:   Lab Results   Component Value Date    WBC 4.8 07/07/2024    HGB 9.4 07/07/2024    HCT 31.7 07/07/2024    .0 07/07/2024    CREATSERUM 0.94 07/07/2024    BUN 9 07/07/2024      07/07/2024    K 3.6 07/07/2024     07/07/2024    CO2 26.0 07/07/2024    GLU 78 07/07/2024    CA 8.9 07/07/2024    ALB 3.2 07/07/2024    ALKPHO 165 07/07/2024    BILT 0.4 07/07/2024    TP 7.1 07/07/2024    AST 31 07/07/2024     07/07/2024       IMAGING:  CT ABDOMEN+PELVIS(CONTRAST ONLY)(CPT=74177)    Result Date: 7/5/2024  CONCLUSION:   1. Low-attenuation lesion which appears to be exophytic inferiorly off of the tail the pancreas measuring 3.2 x by 3.2 by 3.5 cm.  2. Heterogeneously enhancing lesions within the liver which are indeterminate and should be further characterized using MRI with Eovist.  3. Diverticulosis without evidence of diverticulitis.  4. Uterine fibroids.  Findings were discussed with Dr. Clemons by phone at 1500 hours on 7/5/2024.   LOCATION:  [place]   Dictated by (CST): Salvador Urrutia MD on 7/05/2024 at 2:50 PM     Finalized by (CST): Salvador Urrutia MD on 7/05/2024 at 3:03 PM         ASSESSMENT:  38 yo female with VPS currently admitted with abdominal pain.    Plan:  Medtronic VPS re-programmed to 1.5 following MRI  Will sign off    JARROD Rodas  [facility]  7/8/2024, 9:15 AM   Spectre [id number]      A total of 60 minutes of visit time (exclusible of billable procedures) was administered.  > 50 % of time spent counseling/coordinating care     Is this a shared or split note between Advanced Practice Provider and Physician? Yes

## 2024-07-08 NOTE — PLAN OF CARE
Patient alert and oriented x4. Seizure precautions. VSS on RA. Tele in NSR. Pain controlled with PO PRN pain meds. SCDs in place, ankle pumps encouraged. Up ad miesha. Voiding freely in bathroom. NPO, sips with meds. Denies n/v.     Plan: MRI/MRCP to be done, neurosurgery to see

## 2024-07-09 LAB
ALBUMIN SERPL-MCNC: 3.2 G/DL (ref 3.4–5)
ALP LIVER SERPL-CCNC: 140 U/L
ALT SERPL-CCNC: 83 U/L
AST SERPL-CCNC: 11 U/L (ref 15–37)
BILIRUB DIRECT SERPL-MCNC: 0.1 MG/DL (ref 0–0.2)
BILIRUB SERPL-MCNC: 0.3 MG/DL (ref 0.1–2)
PROT SERPL-MCNC: 7.1 G/DL (ref 6.4–8.2)

## 2024-07-09 PROCEDURE — 99221 1ST HOSP IP/OBS SF/LOW 40: CPT | Performed by: NEUROLOGICAL SURGERY

## 2024-07-09 PROCEDURE — 99233 SBSQ HOSP IP/OBS HIGH 50: CPT | Performed by: HOSPITALIST

## 2024-07-09 RX ORDER — PANTOPRAZOLE SODIUM 40 MG/1
40 TABLET, DELAYED RELEASE ORAL
Status: DISCONTINUED | OUTPATIENT
Start: 2024-07-10 | End: 2024-07-11

## 2024-07-09 RX ORDER — DICYCLOMINE HYDROCHLORIDE 10 MG/1
10 CAPSULE ORAL 3 TIMES DAILY PRN
Status: DISCONTINUED | OUTPATIENT
Start: 2024-07-09 | End: 2024-07-11

## 2024-07-09 NOTE — PROGRESS NOTES
[facility]   part of [facility]     Hospitalist Progress Note     Honey Macias Patient Status:  Inpatient    1985 MRN [id number]   [facility] 3SW-A Attending Liv Beltre MD   Hosp Day # 4 PCP Santos Espinosa MD     Chief Complaint: Abdominal pain    Subjective:     Patient with intermittent abdominal pain.    Objective:    Review of Systems:   A comprehensive review of systems was completed; pertinent positive and negatives stated in subjective.    Vital signs:  Temp:  [97.9 °F (36.6 °C)-98.9 °F (37.2 °C)] 98.7 °F (37.1 °C)  Pulse:  [67-81] 73  Resp:  [16-20] 18  BP: (113-125)/(61-72) 113/69  SpO2:  [93 %-100 %] 97 %    Physical Exam:    General: No acute distress  Respiratory: No wheezes, no rhonchi  Cardiovascular: S1, S2, regular rate and rhythm  Abdomen: Soft, Non-tender, non-distended, positive bowel sounds  Neuro: No new focal deficits.   Extremities: No edema      Diagnostic Data:    Labs:  Recent Labs   Lab 24  1221 24  0530 24  0531 24  1311   WBC 4.3 4.7  --  4.8   HGB 10.3* 9.2*  --  9.4*   MCV 73.7* 76.3*  --  74.8*   .0 361.0  --  343.0   INR  --   --  1.11  --        Recent Labs   Lab 24  0530 24  1311 24  1713 24  0520   * 78 91  --    BUN 16 9 8*  --    CREATSERUM 1.18* 0.94 1.03*  --    CA 8.5 8.9 8.8  --    ALB 3.2* 3.2* 3.4 3.2*    138 140  --    K 3.9 3.6 3.6  --     108 107  --    CO2 26.0 26.0 25.0  --    ALKPHO 187* 165* 153* 140*   * 31 14* 11*   * 143* 99* 83*   BILT 0.5 0.4 0.4 0.3   TP 7.1 7.1 7.5 7.1       Estimated Creatinine Clearance: 72.7 mL/min (A) (based on SCr of 1.03 mg/dL (H)).    No results for input(s): \"TROP\", \"TROPHS\", \"CK\" in the last 168 hours.    Recent Labs   Lab 24  0531   PTP 14.3   INR 1.11                  Microbiology    No results found for this visit on 24.      Imaging: Reviewed in Epic.    Medications:    polyethylene  glycol-electrolyte  4,000 mL Oral Once    [START ON 7/10/2024] pantoprazole  40 mg Oral QAM AC    heparin  10,000 Units Subcutaneous Q8H REX    amLODIPine  10 mg Oral Nightly    hydroCHLOROthiazide  25 mg Oral Nightly    losartan  100 mg Oral Nightly    metoprolol succinate ER  50 mg Oral Nightly    topiramate  75 mg Oral BID    sodium ferric gluconate  125 mg Intravenous Daily       Assessment & Plan:      #Abdominal pain due to pancreatic tail mass with liver lesions  -AFP and CA 19-9 wnl  -GI consult, PPi daily  -LFTs improving  -Pain control, PRN antiemetics  -MRI Liver with Eovist reviewed, c/w liver hemangiomas, cysts, pancreatic cystic lesion vs adrenal lesion, recc repeat f/u to ensure stability  -colonoscopy, EUS, EGD per GI  -started on Bentyl     #Elevated LFTs due to above  -improved   -cont to monitor     #Hx of CSF leak s/p patch and then s/p  shunt  -States  shunt needs to be reset within 4 hours if she has MRI  -NSGY consult, sees Dr. Yanez     #Morbid obesity  -BMI is 55.13     #Hypertension  -Resume home meds  -controlled     #Migraines  -Sees Dr. Santos  -On Ubrelvy PRN (ok to bring home med)  -Topamax     #DELLA  -DELLA protocol     #Microcytic anemia  -c/w DARLING    #Anxiety and depression         Liv Beltre MD    Supplementary Documentation:     Quality:  DVT Mechanical Prophylaxis:   SCDs,    DVT Pharmacologic Prophylaxis   Medication    heparin (Porcine) 5000 UNIT/ML injection 10,000 Units                Code Status: Full Code  Marrufo: No urinary catheter in place  Marrufo Duration (in days):   Central line:    LAURA:     Discharge is dependent on: progress  At this point Ms. Macias is expected to be discharge to: home    The 21st Century Cures Act makes medical notes like these available to patients in the interest of transparency. Please be advised this is a medical document. Medical documents are intended to carry relevant information, facts as evident, and the clinical opinion of the  practitioner. The medical note is intended as peer to peer communication and may appear blunt or direct. It is written in medical language and may contain abbreviations or verbiage that are unfamiliar.

## 2024-07-09 NOTE — PLAN OF CARE
Pt NPO now for possible procedure today. VSS, on RA, NSR on tele monitoring. Abdominal discomfort better, tolerated dinner last night. Pt ambulating independently, aware to call staff when assistance needed. Pt aware of plan of care.

## 2024-07-09 NOTE — PLAN OF CARE
A&oriented x4 . VSS. . IS encouraged. Heparin for VTE proph. Ankle pumps encouraged. Tolerating CL diet.  Voiding. Ambulating with  assist. Plan is bowel prep tonight, NPO at midnight for EGD Colonoscopy tomorrow . Patient updated and in agreement with plan of care. Safety precautions in place. Instructed patient to call for assistance, call light within reach.

## 2024-07-09 NOTE — PROGRESS NOTES
Inpatient Follow up Note    Honey Maicas Patient Status:  Inpatient    1985 MRN [id number]   Location [facility] 3SW-A Attending Liv Beltre MD   Hosp Day # 4 PCP Santos Espinosa MD     Reason for Consultation   Abdominal pain, abnormal imaging     Subjective   Feels better today, still having some mild-moderate diffuse intermittent abdominal pain. Endorses chronic intermittent abdominal pain, and alternating constipation/diarrhea, and a past diagnosis of IBS. Has never had EGD/colonoscopy. MRI shows possible pancreatic cyst vs adrenal lesion.             Objective:     /70 (BP Location: Right arm)   Pulse 67   Temp 98.1 °F (36.7 °C) (Oral)   Resp 18   Ht 5' 7\" (1.702 m)   Wt (!) 352 lb (159.7 kg)   LMP 2024 (Approximate)   SpO2 96%   BMI 55.13 kg/m²   Gen: AAOx3  CV: RRR with normal S1 / S2  Resp: CTA bilaterally  Abd: (+)BS, soft, non-tender, non-distended; no rebound or guarding  Ext: No edema or cyanosis  Skin: Warm and dry     Labs/Imaging     LABRCNTIP[PGLU:5@  Recent Labs   Lab 24  0531   INR 1.11         Recent Labs   Lab 24  12224  0530 24  1311   WBC 4.3 4.7 4.8   HGB 10.3* 9.2* 9.4*   .0 361.0 343.0       Recent Labs   Lab 24  12224  0530 24  1311 24  1713    139 138 140   K 3.9 3.9 3.6 3.6    109 108 107   CO2 23.0 26.0 26.0 25.0   BUN 18 16 9 8*   CREATSERUM 1.01 1.18* 0.94 1.03*       Recent Labs   Lab 24  12224  0530 24  1311 24  1713 24  0520   * 231* 143* 99* 83*   * 105* 31 14* 11*     MRI ABDOMEN AND MRCP W/3D (W+W0)(CPT=74183/31827)    Result Date: 2024  CONCLUSION:   1. There is an indeterminate mixed signal intensity lesion that is positioned along the lateral limb of the left adrenal gland, abuts the anterior left renal cortex, abuts some adjacent jejunal loops, and is positioned just inferior to the pancreatic tail.  This  lesion has a reference measurement of approximately 3.4 x 3.8 cm.  The lesion demonstrates signal dropout on in and out of phase imaging, mixed T2 signal, possible trace peripheral enhancement.  Differential considerations would include a lipid rich adrenal adenoma, with a cystic pancreatic neoplasm, small bowel diverticulum, or other etiologies not entirely excluded.  Clinical correlation recommended.  Continued CT follow-up is suggested to confirm stability.  2. Multiple hepatic hemangiomas as above.  Additional right hepatic cyst.  3. Splenomegaly.  Please see above for further details.  LOCATION:  [facility]    Dictated by (CST): Farhan Osuna MD on 7/08/2024 at 5:39 PM     Finalized by (CST): Farhan Osuna MD on 7/08/2024 at 5:53 PM      AST   Date/Time Value Ref Range Status   07/09/2024 05:20 AM 11 (L) 15 - 37 U/L Final   09/04/2013 03:11 PM 7 (L) 15 - 41 U/L Final     ALT   Date/Time Value Ref Range Status   07/09/2024 05:20 AM 83 (H) 13 - 56 U/L Final   09/04/2013 03:11 PM 22 14 - 54 U/L Final     BUN   Date/Time Value Ref Range Status   07/08/2024 05:13 PM 8 (L) 9 - 23 mg/dL Final   09/07/2013 05:56 AM 12 8 - 20 mg/dL Final              Assessment   Ms Macias is a 38 yo F who presents with acute on chronic abdominal pain. CT abdomen pelvis with low-attenuation lesion of the pancreatic tail along with multiple enhancing lesions of the liver. MRI shows liver hemangiomas and a pancreatic cystic lesion vs adrenal lesion. Patient also with elevated LFTs which are downtrending at this time. Patient also with iron deficiency anemia with hemoglobin 9.4 with no active bleeding. Her abdominal pain symptoms are most likely secondary to IBS-mixed, but Ddx includes IBD, celiac disease, PUD, or pancreatic lesion.       Plan   -continue to trend LFTs  -clear liquid diet today; Colonoscopy, EUS, and Upper Endoscopy with the assistance of MAC anesthesia on 7/10 for further evaluation-the risks, benefits and alternatives  were discussed, including the risk of the preparation, anesthesia and the risk of perforation and bleeding with the procedure itself.  The risks of not proceeding were also discussed, including the risks of missing lesions including polyps or masses. The patient expresses an understanding and agrees to proceed as planned  -bentyl 10mg tid prn for abdominal pain  -pantoprazole 40mg po every day        Nicko Mata MD  8:40 AM  7/9/2024  [facility]  [phone]

## 2024-07-10 ENCOUNTER — ANESTHESIA (OUTPATIENT)
Dept: ENDOSCOPY | Facility: HOSPITAL | Age: 39
End: 2024-07-10
Payer: MEDICAID

## 2024-07-10 ENCOUNTER — ANESTHESIA EVENT (OUTPATIENT)
Dept: ENDOSCOPY | Facility: HOSPITAL | Age: 39
End: 2024-07-10
Payer: MEDICAID

## 2024-07-10 LAB
ALBUMIN SERPL-MCNC: 3.3 G/DL (ref 3.4–5)
ALBUMIN/GLOB SERPL: 0.8 {RATIO} (ref 1–2)
ALP LIVER SERPL-CCNC: 144 U/L
ALT SERPL-CCNC: 78 U/L
ANION GAP SERPL CALC-SCNC: 5 MMOL/L (ref 0–18)
AST SERPL-CCNC: 35 U/L (ref 15–37)
BASOPHILS # BLD AUTO: 0.02 X10(3) UL (ref 0–0.2)
BASOPHILS NFR BLD AUTO: 0.4 %
BILIRUB DIRECT SERPL-MCNC: 0.2 MG/DL (ref 0–0.2)
BILIRUB SERPL-MCNC: 0.6 MG/DL (ref 0.1–2)
BUN BLD-MCNC: 7 MG/DL (ref 9–23)
CALCIUM BLD-MCNC: 9 MG/DL (ref 8.5–10.1)
CHLORIDE SERPL-SCNC: 107 MMOL/L (ref 98–112)
CO2 SERPL-SCNC: 26 MMOL/L (ref 21–32)
CREAT BLD-MCNC: 1 MG/DL
EGFRCR SERPLBLD CKD-EPI 2021: 74 ML/MIN/1.73M2 (ref 60–?)
EOSINOPHIL # BLD AUTO: 0.04 X10(3) UL (ref 0–0.7)
EOSINOPHIL NFR BLD AUTO: 0.7 %
ERYTHROCYTE [DISTWIDTH] IN BLOOD BY AUTOMATED COUNT: 17.7 %
GLOBULIN PLAS-MCNC: 4.4 G/DL (ref 2.8–4.4)
GLUCOSE BLD-MCNC: 106 MG/DL (ref 70–99)
HCT VFR BLD AUTO: 32.3 %
HGB BLD-MCNC: 9.9 G/DL
IMM GRANULOCYTES # BLD AUTO: 0.03 X10(3) UL (ref 0–1)
IMM GRANULOCYTES NFR BLD: 0.5 %
LYMPHOCYTES # BLD AUTO: 1.41 X10(3) UL (ref 1–4)
LYMPHOCYTES NFR BLD AUTO: 25.6 %
MAGNESIUM SERPL-MCNC: 2 MG/DL (ref 1.6–2.6)
MCH RBC QN AUTO: 22.8 PG (ref 26–34)
MCHC RBC AUTO-ENTMCNC: 30.7 G/DL (ref 31–37)
MCV RBC AUTO: 74.3 FL
MONOCYTES # BLD AUTO: 0.35 X10(3) UL (ref 0.1–1)
MONOCYTES NFR BLD AUTO: 6.4 %
NEUTROPHILS # BLD AUTO: 3.65 X10 (3) UL (ref 1.5–7.7)
NEUTROPHILS # BLD AUTO: 3.65 X10(3) UL (ref 1.5–7.7)
NEUTROPHILS NFR BLD AUTO: 66.4 %
OSMOLALITY SERPL CALC.SUM OF ELEC: 284 MOSM/KG (ref 275–295)
PLATELET # BLD AUTO: 326 10(3)UL (ref 150–450)
POTASSIUM SERPL-SCNC: 3.8 MMOL/L (ref 3.5–5.1)
PROT SERPL-MCNC: 7.7 G/DL (ref 6.4–8.2)
RBC # BLD AUTO: 4.35 X10(6)UL
SODIUM SERPL-SCNC: 138 MMOL/L (ref 136–145)
WBC # BLD AUTO: 5.5 X10(3) UL (ref 4–11)

## 2024-07-10 PROCEDURE — 0DBE8ZX EXCISION OF LARGE INTESTINE, VIA NATURAL OR ARTIFICIAL OPENING ENDOSCOPIC, DIAGNOSTIC: ICD-10-PCS | Performed by: INTERNAL MEDICINE

## 2024-07-10 PROCEDURE — 99232 SBSQ HOSP IP/OBS MODERATE 35: CPT | Performed by: INTERNAL MEDICINE

## 2024-07-10 PROCEDURE — 0DB98ZX EXCISION OF DUODENUM, VIA NATURAL OR ARTIFICIAL OPENING ENDOSCOPIC, DIAGNOSTIC: ICD-10-PCS | Performed by: INTERNAL MEDICINE

## 2024-07-10 PROCEDURE — 0WBF4ZX EXCISION OF ABDOMINAL WALL, PERCUTANEOUS ENDOSCOPIC APPROACH, DIAGNOSTIC: ICD-10-PCS | Performed by: INTERNAL MEDICINE

## 2024-07-10 PROCEDURE — 0DBP8ZX EXCISION OF RECTUM, VIA NATURAL OR ARTIFICIAL OPENING ENDOSCOPIC, DIAGNOSTIC: ICD-10-PCS | Performed by: INTERNAL MEDICINE

## 2024-07-10 PROCEDURE — 0DB78ZX EXCISION OF STOMACH, PYLORUS, VIA NATURAL OR ARTIFICIAL OPENING ENDOSCOPIC, DIAGNOSTIC: ICD-10-PCS | Performed by: INTERNAL MEDICINE

## 2024-07-10 PROCEDURE — 0DB58ZX EXCISION OF ESOPHAGUS, VIA NATURAL OR ARTIFICIAL OPENING ENDOSCOPIC, DIAGNOSTIC: ICD-10-PCS | Performed by: INTERNAL MEDICINE

## 2024-07-10 RX ORDER — SODIUM CHLORIDE, SODIUM LACTATE, POTASSIUM CHLORIDE, CALCIUM CHLORIDE 600; 310; 30; 20 MG/100ML; MG/100ML; MG/100ML; MG/100ML
INJECTION, SOLUTION INTRAVENOUS CONTINUOUS
Status: DISCONTINUED | OUTPATIENT
Start: 2024-07-10 | End: 2024-07-11

## 2024-07-10 RX ORDER — HYDROMORPHONE HYDROCHLORIDE 1 MG/ML
0.6 INJECTION, SOLUTION INTRAMUSCULAR; INTRAVENOUS; SUBCUTANEOUS EVERY 5 MIN PRN
Status: DISCONTINUED | OUTPATIENT
Start: 2024-07-10 | End: 2024-07-10 | Stop reason: HOSPADM

## 2024-07-10 RX ORDER — PROCHLORPERAZINE EDISYLATE 5 MG/ML
5 INJECTION INTRAMUSCULAR; INTRAVENOUS EVERY 8 HOURS PRN
Status: DISCONTINUED | OUTPATIENT
Start: 2024-07-10 | End: 2024-07-10 | Stop reason: HOSPADM

## 2024-07-10 RX ORDER — SODIUM CHLORIDE, SODIUM LACTATE, POTASSIUM CHLORIDE, CALCIUM CHLORIDE 600; 310; 30; 20 MG/100ML; MG/100ML; MG/100ML; MG/100ML
INJECTION, SOLUTION INTRAVENOUS CONTINUOUS PRN
Status: DISCONTINUED | OUTPATIENT
Start: 2024-07-10 | End: 2024-07-10 | Stop reason: SURG

## 2024-07-10 RX ORDER — HYDROMORPHONE HYDROCHLORIDE 1 MG/ML
0.4 INJECTION, SOLUTION INTRAMUSCULAR; INTRAVENOUS; SUBCUTANEOUS EVERY 5 MIN PRN
Status: DISCONTINUED | OUTPATIENT
Start: 2024-07-10 | End: 2024-07-10 | Stop reason: HOSPADM

## 2024-07-10 RX ORDER — NALOXONE HYDROCHLORIDE 0.4 MG/ML
0.08 INJECTION, SOLUTION INTRAMUSCULAR; INTRAVENOUS; SUBCUTANEOUS AS NEEDED
Status: DISCONTINUED | OUTPATIENT
Start: 2024-07-10 | End: 2024-07-10 | Stop reason: HOSPADM

## 2024-07-10 RX ORDER — ONDANSETRON 2 MG/ML
4 INJECTION INTRAMUSCULAR; INTRAVENOUS EVERY 6 HOURS PRN
Status: DISCONTINUED | OUTPATIENT
Start: 2024-07-10 | End: 2024-07-10 | Stop reason: HOSPADM

## 2024-07-10 RX ORDER — LIDOCAINE HYDROCHLORIDE 10 MG/ML
INJECTION, SOLUTION EPIDURAL; INFILTRATION; INTRACAUDAL; PERINEURAL AS NEEDED
Status: DISCONTINUED | OUTPATIENT
Start: 2024-07-10 | End: 2024-07-10 | Stop reason: SURG

## 2024-07-10 RX ORDER — HYDROMORPHONE HYDROCHLORIDE 1 MG/ML
0.2 INJECTION, SOLUTION INTRAMUSCULAR; INTRAVENOUS; SUBCUTANEOUS EVERY 5 MIN PRN
Status: DISCONTINUED | OUTPATIENT
Start: 2024-07-10 | End: 2024-07-10 | Stop reason: HOSPADM

## 2024-07-10 RX ADMIN — SODIUM CHLORIDE, SODIUM LACTATE, POTASSIUM CHLORIDE, CALCIUM CHLORIDE: 600; 310; 30; 20 INJECTION, SOLUTION INTRAVENOUS at 12:17:00

## 2024-07-10 RX ADMIN — LIDOCAINE HYDROCHLORIDE 25 MG: 10 INJECTION, SOLUTION EPIDURAL; INFILTRATION; INTRACAUDAL; PERINEURAL at 12:21:00

## 2024-07-10 RX ADMIN — SODIUM CHLORIDE, SODIUM LACTATE, POTASSIUM CHLORIDE, CALCIUM CHLORIDE: 600; 310; 30; 20 INJECTION, SOLUTION INTRAVENOUS at 13:10:00

## 2024-07-10 NOTE — ANESTHESIA POSTPROCEDURE EVALUATION
[facility]    Honey Macias Patient Status:  Inpatient   Age/Gender 39 year old female MRN [id number]   Location [facility] Attending Maria L Kline MD   Hosp Day # 5 PCP Santos Espinosa MD       Anesthesia Post-op Note    ESOPHAGOGASTRODUODENOSCOPY (EGD) WITH BIOPSY, ENDOSCOPIC ULTRASOUND (EUS) WITH FINE NEEDLE BIOPSY, COLONOSCOPY WITH BIOPSY    Procedure Summary       Date: 07/10/24 Room / Location:  ENDOSCOPY 02 /  ENDOSCOPY    Anesthesia Start: 1221 Anesthesia Stop: 1312    Procedures:       ESOPHAGOGASTRODUODENOSCOPY (EGD) WITH BIOPSY, ENDOSCOPIC ULTRASOUND (EUS) WITH FINE NEEDLE BIOPSY, COLONOSCOPY WITH BIOPSY      COLONOSCOPY      ENDOSCOPIC ULTRASOUND (EUS) Diagnosis: (normal EGD, intra-abdominal lesion on EUS, diverticulosis, rectal polyp)    Surgeons: Sascha Gentile MD Anesthesiologist:     Anesthesia Type: MAC ASA Status: 3            Anesthesia Type: MAC    Vitals Value Taken Time   BP 95/69 07/10/24 1311   Temp 98.3 °F (36.8 °C) 07/10/24 1310   Pulse 95 07/10/24 1312   Resp 45 07/10/24 1312   SpO2 81 % 07/10/24 1312   Vitals shown include unfiled device data.    Patient Location: PACU    Anesthesia Type: general    Airway Patency: patent    Postop Pain Control: adequate    Mental Status: mildly sedated but able to meaningfully participate in the post-anesthesia evaluation    Nausea/Vomiting: none    Cardiopulmonary/Hydration status: stable euvolemic    Complications: no apparent anesthesia related complications    Postop vital signs: stable    Dental Exam: Unchanged from Preop

## 2024-07-10 NOTE — OPERATIVE REPORT
Honey Macias Patient Status:  Inpatient    1985 MRN [id number]   [facility] 3SW-A Attending Maria L Kline MD   Date 7/10/2024 PCP Santos Espinosa MD     PREOPERATIVE DIAGNOSIS/INDICATION: Change in bowel habits  POSTOPERTATIVE DIAGNOSIS: Rectal polyp  PROCEDURE PERFORMED: COLONOSCOPY with biopsy  SEDATION: MAC sedation provided by General Anesthesia    TIME OUT WAS PERFORMED    INFORMED CONSENT: Risks, benefits and alternatives to the procedure were explained to the patient including but not limited to bleeding, infection, perforation, adverse drug reactions, pancreatitis and the need for hospitalization and surgery if this occurs, the patient understands and agrees to procedure.  PROCEDURE DESCRIPTION: After careful digital rectal examination a pediatric colonoscope was introduced into the patients rectum, advanced pass the recto sigmoid junction, into the descending colon, splenic flexure, transverse colon, hepatic flexure, ascending colon, cecum and the last 5-10cm of the terminal ileum, confirmed by landmarks, including the appendiceal orifice and ileocecal valve. Careful examination of the above described areas was performed on withdrawal of the endoscope. Retroflexion was performed on the rectum. The patient tolerated the procedure well, there were no immediate complication immediately following the procedure, and the patient was transferred to recovery in stable condition.  QUALITY OF PREPARATION: [place] Bowel Preparation Scale:            -      Right colon 3, Transverse colon 3, Left colon 3   FINDINGS/THERAPEUTICS:  TERMINAL ILEUM: Normal  COLON: 2 mm rectal polyp s/p excisional biopsy with cold forceps, s/p random mucosal biopsies with cold forceps  RECOMMENDATIONS:   Post Colonoscopy precautions, watch for bleeding, infection, perforation, adverse drug reactions   Follow biopsies.  Repeat colonoscopy at age 47    Sascha Gentile MD  7/10/2024  4:33 PM

## 2024-07-10 NOTE — OPERATIVE REPORT
Honey Macias Patient Status:  Inpatient    1985 MRN [id number]   [facility] 3SW-A Attending Maria L Kline MD   Date 7/10/2024 PCP Santos Espinosa MD     PREOPERATIVE DIAGNOSIS/INDICATION: Abdominal pain. Abnormal CT: Low-attenuation lesion which appears to be exophytic inferiorly off of the tail the pancreas measuring 3.2 x by 3.2 by 3.5 cm   POSTOPERTATIVE DIAGNOSIS: Intra-abdominal mass  PROCEDURE PERFORMED: EUS/EGD biopsy and FNB  TIME OUT WAS PERFORMED    SEDATION: MAC sedation provided by General Anesthesia    INFORMED CONSENT: Risks, benefits and alternatives to the procedure were explained to the patient including but not limited to bleeding, infection, perforation, adverse drug reactions, pancreatitis and the need for hospitalization and surgery if this occurs, the patient understands and agrees to procedure.  PROCEDURE DESCRIPTION: The upper endoscope and later the therapeutic side viewing echoendoscope were introduced into the patient’s mouth, hypo pharynx, esophagus, stomach and the first and second portion of the duodenum, straightening of the endoscope was performed to obtain a direct view of the major ampulla, retroflexion was performed in the stomach with the EGD scope only. Careful but limited examination of the above described areas was performed on withdrawal of the endoscope. The patient tolerated the procedure well and there were no immediate complications noted during the procedure, the patient was transported to the recovery area in stable condition.  FINDINGS:  ESOPHAGUS: Normal  GEJ: Normal  STOMACH: Normal  DUODENUM: Normal  MAJOR AMPULLA: Normal  ECHO: Imaging was performed through the esophagus, stomach and duodenum. The celiac axis and the left adrenal gland appear wnl, the visualized portions of the left hepatic lobe showed coarse echotexture, and small hyperechoic lesions that ;per imaging suggest hemangiomas, the visualized pancreatic parenchyma showed  hyperechoic stranding and lobulation, the main PD appear wnl, the confluence of the portal vein, superior mesenteric vein and splenic vein appear wnl, there was a intra-abdominal mass between the left kidney and pancreas tail there was a 3.2 cm hyperechoic heterogeneous ovoid mass with small anechoic component and appears independent from surrounding organs   THERAPEUTICS: Cold forceps biopsies were perform ed from duodenum, antrum, esophagus, FNB, 22 G [employer]quire [employer] needle, 4 passes, multiple throws through gastric wall of intra abdominal mass  RECOMMENDATIONS:   Post EUS/FNB precautions, watch for bleeding, infection, perforation, adverse drug reactions and pancreatitis.  Follow biopsies.  Follow cytopathology results  Sascha Gentile MD  7/10/2024  4:35 PM

## 2024-07-10 NOTE — PROGRESS NOTES
[facility]   part of [facility]     Hospitalist Progress Note     Honey Macias Patient Status:  Inpatient    1985 MRN [id number]   Location [facility] 3SW-A Attending Liv Beltre MD   Hosp Day # 5 PCP Santos Espinosa MD     Chief Complaint: Abdominal pain    Subjective:     Patient with intermittent diffuse 4-5 out of 10 abdominal pain.  Awaiting colonoscopy today.    Objective:    Review of Systems:   A comprehensive review of systems was completed; pertinent positive and negatives stated in subjective.    Vital signs:  Temp:  [98.1 °F (36.7 °C)-98.9 °F (37.2 °C)] 98.1 °F (36.7 °C)  Pulse:  [67-94] 68  Resp:  [18] 18  BP: (113-146)/(66-81) 114/69  SpO2:  [97 %-99 %] 98 %    Physical Exam:   /70 (BP Location: Left arm)   Pulse 83   Temp 98 °F (36.7 °C) (Oral)   Resp 16   Ht 5' 7\" (1.702 m)   Wt (!) 352 lb (159.7 kg)   LMP 2024 (Approximate)   SpO2 95%   BMI 55.13 kg/m²      General: No acute distress  Respiratory: Clear to auscultation bilateral, no wheezes, no rhonchi  Cardiovascular: S1, S2, regular rate and rhythm  Abdomen: Soft, Non-tender, non-distended, positive bowel sounds  Neuro: No new focal deficits.   Extremities: No pedal edema      Diagnostic Data:    Labs:  Recent Labs   Lab 24  1221 24  0530 24  0531 24  1311 07/10/24  0508   WBC 4.3 4.7  --  4.8 5.5   HGB 10.3* 9.2*  --  9.4* 9.9*   MCV 73.7* 76.3*  --  74.8* 74.3*   .0 361.0  --  343.0 326.0   INR  --   --  1.11  --   --        Recent Labs   Lab 24  1311 24  1713 24  0520 07/10/24  0508   GLU 78 91  --  106*   BUN 9 8*  --  7*   CREATSERUM 0.94 1.03*  --  1.00   CA 8.9 8.8  --  9.0   ALB 3.2* 3.4 3.2* 3.3*    140  --  138   K 3.6 3.6  --  3.8    107  --  107   CO2 26.0 25.0  --  26.0   ALKPHO 165* 153* 140* 144*   AST 31 14* 11* 35   * 99* 83* 78*   BILT 0.4 0.4 0.3 0.6   TP 7.1 7.5 7.1 7.7       Estimated Creatinine Clearance: 74.9  mL/min (based on SCr of 1 mg/dL).    No results for input(s): \"TROP\", \"TROPHS\", \"CK\" in the last 168 hours.    Recent Labs   Lab 07/06/24  0531   PTP 14.3   INR 1.11                  Microbiology    No results found for this visit on 07/05/24.      Imaging: Reviewed in Epic.  MRI of the abdomen with MRCP showed  CONCLUSION:       1. There is an indeterminate mixed signal intensity lesion that is positioned along the lateral limb of the left adrenal gland, abuts the anterior left renal cortex, abuts some adjacent jejunal loops, and is positioned just inferior to the pancreatic   tail.  This lesion has a reference measurement of approximately 3.4 x 3.8 cm.  The lesion demonstrates signal dropout on in and out of phase imaging, mixed T2 signal, possible trace peripheral enhancement.  Differential considerations would include a   lipid rich adrenal adenoma, with a cystic pancreatic neoplasm, small bowel diverticulum, or other etiologies not entirely excluded.  Clinical correlation recommended.  Continued CT follow-up is suggested to confirm stability.      2. Multiple hepatic hemangiomas as above.  Additional right hepatic cyst.      3. Splenomegaly.     These results were discussed with the patient and advised follow-up with gastroenterologist  regarding the same    Medications:    pantoprazole  40 mg Oral QAM AC    heparin  10,000 Units Subcutaneous Q8H REX    amLODIPine  10 mg Oral Nightly    hydroCHLOROthiazide  25 mg Oral Nightly    losartan  100 mg Oral Nightly    metoprolol succinate ER  50 mg Oral Nightly    topiramate  75 mg Oral BID    sodium ferric gluconate  125 mg Intravenous Daily       Assessment & Plan:      #Abdominal pain due to pancreatic tail mass with liver lesions  -AFP and CA 19-9 wnl  -GI consult, PPi daily  -LFTs improving  -Pain control, PRN antiemetics  -MRI Liver with Eovist showed possible liver hemangiomas, cysts, pancreatic cystic lesion vs adrenal lesion, this was discussed with patient  in detail, advised follow-up with GI and his regular outpatient primary care physician as outpatient for follow-up on this, patient verbalized understanding  -colonoscopy, EUS, EGD planned by GI today  -on Bentyl     #Elevated LFTs due to above  -Liver function test improving since admission  -Seen by GI on consult  -cont to monitor     #Hx of CSF leak s/p patch and then s/p  shunt  -States  shunt needs to be reset within 4 hours if she has MRI  -NSGY consult, sees Dr. Yanez     #Morbid obesity  -BMI is 55.13     #Hypertension  -Resume home meds  -controlled     #Migraines  -Sees Dr. Santos  -On Ubrelvy PRN (ok to bring home med)  -Topamax     #DELLA  -DELLA protocol     #Microcytic anemia  -c/w DARLING    #Anxiety and depression     Maria L Kline MD  7/10/2024      Supplementary Documentation:     Quality:  DVT Mechanical Prophylaxis:   SCDs,    DVT Pharmacologic Prophylaxis   Medication    heparin (Porcine) 5000 UNIT/ML injection 10,000 Units                Code Status: Full Code  Marrufo: No urinary catheter in place  Marrufo Duration (in days):   Central line:    LAURA:     Discharge is dependent on: progress  At this point Ms. Macias is expected to be discharge to: home    The 21st Century Cures Act makes medical notes like these available to patients in the interest of transparency. Please be advised this is a medical document. Medical documents are intended to carry relevant information, facts as evident, and the clinical opinion of the practitioner. The medical note is intended as peer to peer communication and may appear blunt or direct. It is written in medical language and may contain abbreviations or verbiage that are unfamiliar.

## 2024-07-10 NOTE — ANESTHESIA PREPROCEDURE EVALUATION
PRE-OP EVALUATION    Patient Name: Honey Macias    Admit Diagnosis: Liver masses [R16.0]  Elevated liver enzymes [R74.8]  Pancreatic mass (HCC) [K86.89]  Abdominal pain of unknown etiology [R10.9]    Pre-op Diagnosis: INPATIENT    ESOPHAGOGASTRODUODENOSCOPY (EGD), ENDOSCOPIC ULTRASOUND (EUS), COLONOSCOPY    Anesthesia Procedure: ESOPHAGOGASTRODUODENOSCOPY (EGD), ENDOSCOPIC ULTRASOUND (EUS), COLONOSCOPY  COLONOSCOPY  ENDOSCOPIC ULTRASOUND (EUS)    Surgeons and Role:     * Sascha Gentile MD - Primary    Pre-op vitals reviewed.  Temp: 98.1 °F (36.7 °C)  Pulse: 68  Resp: 18  BP: 114/69  SpO2: 98 %  Body mass index is 55.13 kg/m².    Current medications reviewed.  Hospital Medications:   [COMPLETED] polyethylene glycol-electrolyte (Golytely) 236 g oral solution 4,000 mL  4,000 mL Oral Once    dicyclomine (Bentyl) cap 10 mg  10 mg Oral TID PRN    pantoprazole (Protonix) DR tab 40 mg  40 mg Oral QAM AC    [COMPLETED] Gadoxetate Disodium (EOVIST) 0.25 MOL/L injection 10 mL  10 mL Intravenous ONCE PRN    ubrogepant (Ubrelvy) TABS 50 mg (Patient Supplied)  50 mg Oral BID PRN    heparin (Porcine) 5000 UNIT/ML injection 10,000 Units  10,000 Units Subcutaneous Q8H REX    [COMPLETED] ondansetron (Zofran) 4 MG/2ML injection 4 mg  4 mg Intravenous Once    [COMPLETED] ketorolac (Toradol) 15 MG/ML injection 15 mg  15 mg Intravenous Once    [COMPLETED] iopamidol 76% (ISOVUE-370) injection for power injector  100 mL Intravenous ONCE PRN    amLODIPine (Norvasc) tab 10 mg  10 mg Oral Nightly    hydroCHLOROthiazide tab 25 mg  25 mg Oral Nightly    losartan (Cozaar) tab 100 mg  100 mg Oral Nightly    metoprolol succinate ER (Toprol XL) 24 hr tab 50 mg  50 mg Oral Nightly    topiramate (TopaMAX) tab 75 mg  75 mg Oral BID    [] sodium chloride 0.9% infusion   Intravenous Continuous    HYDROmorphone (Dilaudid) 1 MG/ML injection 0.2 mg  0.2 mg Intravenous Q2H PRN    Or    HYDROmorphone (Dilaudid) 1 MG/ML injection 0.4 mg  0.4 mg  Intravenous Q2H PRN    Or    HYDROmorphone (Dilaudid) 1 MG/ML injection 0.8 mg  0.8 mg Intravenous Q2H PRN    acetaminophen (Tylenol Extra Strength) tab 500 mg  500 mg Oral Q4H PRN    melatonin tab 3 mg  3 mg Oral Nightly PRN    polyethylene glycol (PEG 3350) (Miralax) 17 g oral packet 17 g  17 g Oral Daily PRN    sennosides (Senokot) tab 17.2 mg  17.2 mg Oral Nightly PRN    bisacodyl (Dulcolax) 10 MG rectal suppository 10 mg  10 mg Rectal Daily PRN    fleet enema (Fleet) 7-19 GM/118ML rectal enema 133 mL  1 enema Rectal Once PRN    [] ketorolac (Toradol) 15 MG/ML injection 15 mg  15 mg Intravenous Q6H PRN    ondansetron (Zofran) 4 MG/2ML injection 4 mg  4 mg Intravenous Q6H PRN    prochlorperazine (Compazine) 10 MG/2ML injection 5 mg  5 mg Intravenous Q8H PRN    traMADol (Ultram) tab 50 mg  50 mg Oral Q6H PRN    benzonatate (Tessalon) cap 200 mg  200 mg Oral TID PRN    guaiFENesin (Robitussin) 100 MG/5 ML oral liquid 200 mg  200 mg Oral Q4H PRN    glycerin-hypromellose- (Artificial Tears) 0.2-0.2-1 % ophthalmic solution 1 drop  1 drop Both Eyes QID PRN    sodium chloride (Saline Mist) 0.65 % nasal solution 1 spray  1 spray Each Nare Q3H PRN    sodium ferric gluconate (Ferrlecit) 125 mg in sodium chloride 0.9% 100mL IVPB premix  125 mg Intravenous Daily       Outpatient Medications:     Medications Prior to Admission   Medication Sig Dispense Refill Last Dose    topiramate 25 MG Oral Tab Take 3 tablets (75 mg total) by mouth 2 (two) times daily. 180 tablet 2 2024 at 2100    ubrogepant (UBRELVY) 50 MG Oral Tab Take one tablet at onset of migraine.  May take additional tablet in 2 hours if needed.  Do not exceed two tablets per 24 hour period. 10 tablet 5 Past Week    amLODIPine 10 MG Oral Tab daily.   2024 at 2100    losartan 100 MG Oral Tab Take 1 tablet (100 mg total) by mouth daily.   2024 at 2100    hydroCHLOROthiazide 25 MG Oral Tab Take 1 tablet (25 mg total) by mouth daily.   2024  at 2100    metoprolol succinate ER 50 MG Oral Tablet 24 Hr Take 1 tablet (50 mg total) by mouth daily.   7/4/2024 at 2100       Allergies: Bee venom, Triptans, Amoxicillin, Penicillins, and Lisinopril      Anesthesia Evaluation    Patient summary reviewed.    Anesthetic Complications           GI/Hepatic/Renal      (+) GERD                           Cardiovascular                (+) obesity  (+) hypertension                                     Endo/Other                                  Pulmonary      (+) asthma         (+) shortness of breath     (+) sleep apnea       Neuro/Psych      (+) depression                                Past Surgical History:   Procedure Laterality Date    Adenoidectomy      Other surgical history  9-4-13 [facility]     Incision and Drainage of Deep Left Axillary Abscess    Other surgical history  02/03/2023    ENDOSCOPIC ENDONASAL CEREBROSPINAL FLUID LEAK REPAIR WITH ABDOMINAL FAT GRAFT    Other surgical history      Other surgical history  02/10/2023    RIGHT OCCIPITAL VENTRICULOPERITONEAL SHUNT INSERTION    Tonsillectomy       Social History     Socioeconomic History    Marital status: Single   Tobacco Use    Smoking status: Never     Passive exposure: Never    Smokeless tobacco: Never   Vaping Use    Vaping status: Never Used   Substance and Sexual Activity    Alcohol use: Yes     Comment: socially    Drug use: No   Other Topics Concern    Caffeine Concern No    Exercise Yes     Comment: walks     History   Drug Use No     Available pre-op labs reviewed.  Lab Results   Component Value Date    WBC 5.5 07/10/2024    RBC 4.35 07/10/2024    HGB 9.9 (L) 07/10/2024    HCT 32.3 (L) 07/10/2024    MCV 74.3 (L) 07/10/2024    MCH 22.8 (L) 07/10/2024    MCHC 30.7 (L) 07/10/2024    RDW 17.7 07/10/2024    .0 07/10/2024     Lab Results   Component Value Date     07/10/2024    K 3.8 07/10/2024     07/10/2024    CO2 26.0 07/10/2024    BUN 7 (L) 07/10/2024    CREATSERUM 1.00  07/10/2024     (H) 07/10/2024    CA 9.0 07/10/2024     Lab Results   Component Value Date    INR 1.11 07/06/2024         Airway      Mallampati: III  Mouth opening: <3 FB  TM distance: < 4 cm  Neck ROM: limited Cardiovascular    Cardiovascular exam normal.  Rhythm: regular  Rate: normal     Dental             Pulmonary    Pulmonary exam normal.                 Other findings              ASA: 3   Plan: MAC  NPO status verified and patient meets guidelines.          Plan/risks discussed with: patient                Present on Admission:   Hyperglycemia

## 2024-07-10 NOTE — PLAN OF CARE
Assumed care of pt at 1930, pt stated during walking rounds that her iv in her left ac was hurting and had a swelling right above it. A small knot was noted above iv site and area was tender to touch. Iv was sl. Iv removed by charge Rn. Pt is a hard stick and  CNICU charge nurse made multiple unsuccessful attempts so [facility] was notified and iv started by US. Extended iv placed to right ac. Pt denies pain, completed first half of golytely and output is clear with \"flecks\" of stool per pt. Second half started per orders at 0100. Consent signed. Remains on seizure prec. All questions and concerns addressed.   0500- pt completed second half of golytely and output is clear per pt no stool noted.

## 2024-07-10 NOTE — PLAN OF CARE
A&O x 4. VSS. On RA/DELLA no CPAP. Tele-NSR. No c/o pain this morning. Up at [facility] to bathroom. Voiding without issue. Plan for Colonoscopy @ 1130 today. Reviewed POC with pt. Will continue to monitor.

## 2024-07-10 NOTE — PAYOR COMM NOTE
SENDING ADMIT REVIEW ASKING FOR RE-REVIEW OF DOCUMENTS FOR RECONSIDERATION INPT    CLINICALS WERE SUBMITTED ON 7/8  YOUR DENIAL INDICATES NOT RECEIVED  NO FAILURE SEEN FOR SUBMITTING.AT THIS  END.   PT REMAINS IN HOUSE ON 7/10.     7/5 THRU 7/7  ADMISSION REVIEW     Payor: [facility]  Subscriber #:  [id number]  Authorization Number: [id number]     Admit date: 7/5/24  Admit time:  4:59 PM                                  REVIEW DOCUMENTATION:      ED Provider Notes          ED Provider Notes signed by Rainer Clemons MD at 7/5/2024  3:46 PM         Author: Rainer Clemons MD Service: -- Author Type: Physician     Filed: 7/5/2024  3:46 PM Date of Service: 7/5/2024 11:49 AM Status: Signed     : Rainer Clemons MD (Physician)               Patient Seen in: [facility]        History          Chief Complaint   Patient presents with    Abdomen/Flank Pain      Stated Complaint: Abd painn since last noc     Subjective:   HPI     Patient is a 39-year-old female who last night developed severe abdominal pain.  Patient with sharp stabbing 8 out of 10 periumbilical location.  Patient states been fairly constant currently 6 out of 10.  Patient has nausea but no vomiting.  Patient denies any diarrhea.  Patient believes last bowel movement was a day ago.  Patient denies any blood in her stool, no black stool.  Patient states she has had some intermittent pain for the last few months with alternating constipation and diarrhea.  Patient has not had any workup done regarding this.  Patient denies hematuria or dysuria, no chest pain, shortness of breath, no headache.  Remainder of review of systems negative.     Objective:       Past Medical History:    Anxiety    Asthma (HCC)    Back problem    Depression    Diastolic dysfunction    Extrinsic asthma, unspecified    High blood pressure    Lymphadenitis    Migraines    Obesity    Pituitary tumor    Pulmonary hypertension (HCC)     Seizure disorder (HCC)    Shortness of breath    Sleep apnea                         Past Surgical History:   Procedure Laterality Date    Adenoidectomy        Other surgical history   9-4-13 Green EDW      Incision and Drainage of Deep Left Axillary Abscess    Other surgical history   02/03/2023     ENDOSCOPIC ENDONASAL CEREBROSPINAL FLUID LEAK REPAIR WITH ABDOMINAL FAT GRAFT    Other surgical history        Other surgical history   02/10/2023     RIGHT OCCIPITAL VENTRICULOPERITONEAL SHUNT INSERTION    Tonsillectomy                          Social History            Socioeconomic History    Marital status: Single   Tobacco Use    Smoking status: Never       Passive exposure: Never    Smokeless tobacco: Never   Vaping Use    Vaping status: Never Used   Substance and Sexual Activity    Alcohol use: Yes       Comment: socially    Drug use: No   Other Topics Concern    Caffeine Concern No    Exercise Yes       Comment: walks      Social Determinants of Health        Received from [facility]             Non-smoker, no alcohol no drugs     Review of Systems     Positive for stated Chief Complaint: Abdomen/Flank Pain     Other systems are as noted in HPI.  Constitutional and vital signs reviewed.       All other systems reviewed and negative except as noted above.     Physical Exam           ED Triage Vitals   BP 07/05/24 1118 143/70   Pulse 07/05/24 1118 82   Resp 07/05/24 1118 16   Temp 07/05/24 1118 98.2 °F (36.8 °C)   Temp src 07/05/24 1118 Temporal   SpO2 07/05/24 1118 100 %   O2 Device 07/05/24 1215 None (Room air)         Current Vitals:   Vital Signs  BP: 103/66  Pulse: 59  Resp: 18  Temp: 98.2 °F (36.8 °C)  Temp src: Temporal  MAP (mmHg): 76     Oxygen Therapy  SpO2: 99 %  O2 Device: None (Room air)                 Physical Exam  GENERAL: Patient resting  on the cart in no acute distress.  HEENT: Extraocular muscles intact, pupils equal round reactive to light  Mouth normal, neck supple, no  meningismus.  LUNGS: Lungs clear to auscultation bilaterally.  CARDIOVASCULAR: + S1-S2, regular rate and rhythm, no murmurs.  BACK: No CVA tenderness, no midline bony tenderness.  ABDOMEN: + Bowel sounds, soft, moderate diffuse tenderness, nondistended.  No rebound, no guarding, no hepatosplenomegaly.  EXTREMITIES: Full range of motion, no tenderness, good capillary refill.  SKIN: No rash, good turgor.  NEURO: Patient answers questions appropriately.  No focal deficits appreciated.              ED Course            Labs Reviewed   COMP METABOLIC PANEL (14) - Abnormal; Notable for the following components:       Result Value      Glucose 124 (*)        (*)        (*)       Alkaline Phosphatase 233 (*)       Globulin  4.6 (*)       A/G Ratio 0.8 (*)       All other components within normal limits   URINALYSIS WITH CULTURE REFLEX - Abnormal; Notable for the following components:     Urobilinogen Urine 8 (*)       All other components within normal limits   CBC W/ DIFFERENTIAL - Abnormal; Notable for the following components:     HGB 10.3 (*)       HCT 33.9 (*)       MCV 73.7 (*)       MCH 22.4 (*)       MCHC 30.4 (*)       Lymphocyte Absolute 0.97 (*)       All other components within normal limits   LIPASE - Normal   POCT PREGNANCY URINE - Normal   CBC WITH DIFFERENTIAL WITH PLATELET     Narrative:      The following orders were created for panel order CBC With Differential With Platelet.  Procedure                               Abnormality         Status                     ---------                               -----------         ------                     CBC W/ DIFFERENTIAL[[id number]]          Abnormal            Final result                  Please view results for these tests on the individual orders.   RAINBOW DRAW LAVENDER   RAINBOW DRAW LIGHT GREEN               CT abdomen pelvis1. Low-attenuation lesion which appears to be exophytic inferiorly off of the tail the pancreas measuring 3.2 x by  3.2 by 3.5 cm.      2. Heterogeneously enhancing lesions within the liver which are indeterminate and should be further characterized using MRI with Eovist.      3. Diverticulosis without evidence of diverticulitis.      4. Uterine fibroids.   Independently reviewed by myself, no free air           MDM      Patient given IV fluids.  Patient with Zofran.  Patient declines pain medicines.  Patient eventually did request something for pain and did not want narcotic was given Toradol.  Patient had no significant change in her pain.  Patient eventually was given Dilaudid as well.  Patient CT did have a lesion of the pancreas with liver lesions as well and elevated liver enzymes.  I did speak with GI Dr. ruiz who recommended admission for further imaging and evaluation.  Patient is comfortable with this plan.  I did consider pancreas mass with liver lesions, electrolyte abnormalities, diverticulitis, bowel obstruction.  [facility]  Patient states her  shunt needs to be reset within 4 hours if she does have an MRI.  Patient's neurosurgeon is Dr. cordova                                         Medical Decision Making        Disposition and Plan      Clinical Impression:  1. Abdominal pain of unknown etiology    2. Pancreatic mass (HCC)    3. Liver masses    4. Elevated liver enzymes          Disposition:  There is no disposition on file for this visit.  There is no disposition time on file for this visit.     Follow-up:  No follow-up provider specified.           Medications Prescribed:  Current Discharge Medication List                                                          Signed by Rainer Clemons MD on 7/5/2024  3:46 PM            MEDICATIONS ADMINISTERED IN LAST 1 DAY:  amLODIPine (Norvasc) tab 10 mg         Date Action Dose Route User     7/7/2024 2047 Given 10 mg Oral Omero Raymundo, RN             heparin (Porcine) 5000 UNIT/ML injection 10,000 Units         Date Action Dose Route  User     7/8/2024 1326 Given 10,000 Units Subcutaneous (Left Lower Abdomen) Radha De Jesus RN     7/8/2024 0555 Given 10,000 Units Subcutaneous (Left Lower Abdomen) Omero Raymundo RN     7/7/2024 2047 Given 10,000 Units Subcutaneous (Right Lower Abdomen) Omero Raymundo RN     7/7/2024 1544 Given 10,000 Units Subcutaneous (Left Lower Abdomen) Romeo Bettencourt RN             hydroCHLOROthiazide tab 25 mg         Date Action Dose Route User     7/7/2024 2047 Given 25 mg Oral Omero Raymundo RN             losartan (Cozaar) tab 100 mg         Date Action Dose Route User     7/7/2024 2047 Given 100 mg Oral Omero Raymundo RN             metoprolol succinate ER (Toprol XL) 24 hr tab 50 mg         Date Action Dose Route User     7/7/2024 2046 Given 50 mg Oral Omero Raymundo RN             pantoprazole (Protonix) 40 mg in sodium chloride 0.9% PF 10 mL IV push         Date Action Dose Route User     7/8/2024 0555 Given 40 mg Intravenous Omero Raymundo RN     7/7/2024 1544 Given 40 mg Intravenous Romeo Bettencourt RN             sodium ferric gluconate (Ferrlecit) 125 mg in sodium chloride 0.9% 100mL IVPB premix         Date Action Dose Route User     7/8/2024 1327 New Bag 125 mg Intravenous Radha De Jesus RN             topiramate (TopaMAX) tab 75 mg         Date Action Dose Route User     7/8/2024 0800 Given 75 mg Oral Radha De Jesus RN     7/7/2024 2047 Given 75 mg Oral Omero Raymundo RN             traMADol (Ultram) tab 50 mg         Date Action Dose Route User     7/7/2024 2053 Given 50 mg Oral Omero Raymundo RN                Vitals (last day)         Date/Time Temp Pulse Resp BP SpO2 Weight O2 Device O2 Flow Rate (L/min) [facility]     07/08/24 1235 98.9 °F (37.2 °C) 69 16 118/72 100 % -- None (Room air) --      07/08/24 0810 98.4 °F (36.9 °C) 60 14 114/51 94 % -- None (Room air) --      07/08/24 0345 98 °F (36.7 °C) 62 16 104/56 96 % -- None (Room air) -- RR     07/08/24 0015 98.1 °F (36.7 °C) 71 18 107/61 96 %  -- None (Room air) -- RR     07/07/24 2010 98.6 °F (37 °C) 65 20 111/58 100 % -- None (Room air) -- RR     07/07/24 1617 98.9 °F (37.2 °C) 67 18 105/48 100 % -- Nasal cannula 0 L/min PB     07/07/24 1200 98.2 °F (36.8 °C) 64 18 112/36 96 % -- None (Room air) 0 L/min PB     07/07/24 0945 -- 83 -- -- 100 % -- -- -- PB     07/07/24 0725 98.8 °F (37.1 °C) 62 17 120/70 94 % -- None (Room air) 0 L/min PB     07/07/24 0400 97.9 °F (36.6 °C) 64 16 124/52 98 % -- None (Room air) --                7/5 GI CONSULT NOTE       Reason for consultation: abdominal pain, abnormal imaging  HPI: Ms Macias is a 40 yo F who presents with upper abdominal pain which started 3 months ago. The pain is constant but changes in severity. She reports a few episode of intense pain. She has had nausea, vomiting. She reports that her BMs alternate between constipation and diarrhea which is chronic. She denies unintentional weight loss. She denies fevers. She denies melena or hematochezia. She denies herbal medications.     Impression:   Epigastric pain: DDx includes secondary to pancreatic lesion, PUD, IBS, etc  Liver lesion and elevated LFTs: obtain laboratory work up and MRI liver  Microcytic Anemia     Recommendations:   MRI Liver with Eovist  PPI BID  Obtain laboratory work up for elevated LFTs  Pancreatic elastase, fecal fat  Iron studies  Await above; if she has metastatic disease to liver, would obtain bx of liver lesion vs EUS with FNA of pancreatic lesion     H&P  Chief Complaint: Abdominal pain        Subjective:  History of Present Illness:   Honey Macias is a 39 year old female with PMHx anxiety, depression, migraines, hypertension, DELLA, morbid obesity, CSF leak s/p patch and then s/p  shunt who presents to the hospital for evaluation of abdominal pain. She has had intermittent abdominal pain for the past 3 months but has been more severe and constant since last night. She describes it as a stabbing sensation in the todd-umbilical  region. She has associated nausea and vomiting. She denies any melena or hematochezia. No fever, chills, chest pain, SOB or dysuria. BM change between constipation and diarrhea. No weight loss or night sweats.  In the ER, CT a/p shows pancreatic tail mass with liver lesions. Her LFTs are elevated with normal bilirubin.           Assessment & Plan:  #Abdominal pain due to pancreatic tail mass with liver lesions  -Check AFP and CA 19-9  -GI consult  -Pain control, PRN antiemetics  -MRI Liver with Eovist     #Elevated LFTs due to above  -Trend   -Work up sent by GI     #Hx of CSF leak s/p patch and then s/p  shunt  -States  shunt needs to be reset within 4 hours if she has MRI  -NSGY consult, sees Dr. Yanez     #Morbid obesity  -BMI is 55.13     #Hypertension  -Resume home meds     #Migraines  -Sees Dr. Santos  -On Ubrelvy PRN (ok to bring home med)  -Topamax     #DELLA  -DELLA protocol     #Microcytic anemia  -Check iron studies    #Anxiety and depression     7/6 GI NOTE  CC: abd pain, abnormal imaging  S: Patient with improved abdominal pain at this time.  O: /62 (BP Location: Right arm)   Pulse 60   Temp 98 °F (36.7 °C) (Oral)   Resp 17   Ht 5' 7\" (1.702 m)   Wt (!) 352 lb (159.7 kg)   LMP 06/23/2024 (Approximate)   SpO2 97%   BMI 55.13 kg/m²      Gen: NAD  Abd: (+)BS, soft, non-tender, non-distended; no rebound or guarding       Assessment/Plan: 40 yo F who presents with upper abdominal pain.  CT abdomen pelvis with low-attenuation lesion of the pancreatic tail along with multiple enhancing lesions of the liver.  Patient also with elevated LFTs which are downtrending at this time.  Patient also with iron deficiency anemia with hemoglobin 9.2 with no active bleeding.    -MRCP/MR liver pending  -Chronic liver disease workup negative/pending  -Can consider bidirectional endoscopy pending clinical progression and MRI findings  -Daily LFTs  7/6 HOSPITALIST NOTE  Chief Complaint: Abdominal pain         Subjective:  Patient states abdominal pain about the same, denies nausea, vomiting.     Vital signs:  Temp:  [98 °F (36.7 °C)-98.2 °F (36.8 °C)] 98 °F (36.7 °C)  Pulse:  [57-82] 60  Resp:  [16-18] 17  BP: (100-143)/(60-85) 135/62  SpO2:  [96 %-100 %] 97 %        Recent Labs   Lab 07/05/24  1221 07/06/24  0530 07/06/24  0531   WBC 4.3 4.7  --    HGB 10.3* 9.2*  --    MCV 73.7* 76.3*  --    .0 361.0  --    INR  --   --  1.11                 Recent Labs   Lab 07/05/24  1221 07/06/24  0530   * 111*   BUN 18 16   CREATSERUM 1.01 1.18*   CA 8.8 8.5   ALB 3.6 3.2*    139   K 3.9 3.9    109   CO2 23.0 26.0   ALKPHO 233* 187*   * 105*   * 231*   BILT 0.9 0.5   TP 8.2 7.1              Recent Labs   Lab 07/06/24  0531   PTP 14.3   INR 1.11        Medications:   Scheduled Medications    pantoprazole  40 mg Intravenous Q12H    amLODIPine  10 mg Oral Nightly    hydroCHLOROthiazide  25 mg Oral Nightly    losartan  100 mg Oral Nightly    metoprolol succinate ER  50 mg Oral Nightly    topiramate  75 mg Oral BID    heparin  7,500 Units Subcutaneous Q8H REX    sodium ferric gluconate  125 mg Intravenous Daily                  Assessment & Plan:  #Abdominal pain due to pancreatic tail mass with liver lesions  -AFP and CA 19-9 wnl  -GI consult  -Pain control, PRN antiemetics  -MRI Liver with Eovist pending     #Elevated LFTs due to above  -Trend, lower today  -Work up sent by GI     #Hx of CSF leak s/p patch and then s/p  shunt  -States  shunt needs to be reset within 4 hours if she has MRI  -NSGY consult, sees Dr. Yanez     #Morbid obesity  -BMI is 55.13     #Hypertension  -Resume home meds  -controlled     #Migraines  -Sees Dr. Santos  -On Ubrelvy PRN (ok to bring home med)  -Topamax     #DELLA  -DELLA protocol     #Microcytic anemia  -Check iron studies    #Anxiety and depression      7/7 GI NOTE    CC: abd pain, abnormal imaging  S: Patient with stable abdominal pain at this time.  O: BP  120/70 (BP Location: Right arm)   Pulse 62   Temp 98.8 °F (37.1 °C) (Axillary)   Resp 17   Ht 5' 7\" (1.702 m)   Wt (!) 352 lb (159.7 kg)   LMP 06/23/2024 (Approximate)   SpO2 94%   BMI 55.13 kg/m²      Gen: NAD  Abd: (+)BS, soft, non-tender, non-distended; no rebound or guarding    Assessment/Plan: 40 yo F who presents with upper abdominal pain.  CT abdomen pelvis with low-attenuation lesion of the pancreatic tail along with multiple enhancing lesions of the liver.  Patient also with elevated LFTs which are downtrending at this time.  Patient also with iron deficiency anemia with hemoglobin 9.4 with no active bleeding.    -MRCP/MR liver pending  -Chronic liver disease workup negative/pending  -Can consider bidirectional endoscopy pending clinical progression and MRI findings  -Daily LFTs   7/7 HOSPITALIST NOTE  hief Complaint: Abdominal pain        Subjective:  Patient states abdominal pain improved, denies nausea, vomiting.           Objective:  Review of Systems:   A comprehensive review of systems was completed; pertinent positive and negatives stated in subjective.     Vital signs:  Temp:  [97.9 °F (36.6 °C)-99 °F (37.2 °C)] 98.8 °F (37.1 °C)  Pulse:  [60-79] 62  Resp:  [16-18] 17  BP: ()/(52-78) 120/70  SpO2:  [94 %-100 %] 94 %     Physical Exam:    General: No acute distress  Respiratory: No wheezes, no rhonchi  Cardiovascular: S1, S2, regular rate and rhythm  Abdomen: Soft, Non-tender, non-distended, positive bowel sounds  Neuro: No new focal deficits.   Extremities: No edema        Medications:   Scheduled Medications    heparin  10,000 Units Subcutaneous Q8H REX    pantoprazole  40 mg Intravenous Q12H    amLODIPine  10 mg Oral Nightly    hydroCHLOROthiazide  25 mg Oral Nightly    losartan  100 mg Oral Nightly    metoprolol succinate ER  50 mg Oral Nightly    topiramate  75 mg Oral BID    sodium ferric gluconate  125 mg Intravenous Daily                  Assessment & Plan:  #Abdominal pain due  to pancreatic tail mass with liver lesions  -AFP and CA 19-9 wnl  -GI consult, PPi BID, pancreatic elastase, fecal fat  -Pain control, PRN antiemetics  -MRI Liver with Eovist pending     #Elevated LFTs due to above  -Trend, lower yesterday, repeat pending today  -Work up sent by GI     #Hx of CSF leak s/p patch and then s/p  shunt  -States  shunt needs to be reset within 4 hours if she has MRI  -NSGY consult, sees Dr. Yanez     #Morbid obesity  -BMI is 55.13     #Hypertension  -Resume home meds  -controlled     #Migraines  -Sees Dr. Santos  -On Ubrelvy PRN (ok to bring home med)  -Topamax     #DELLA  -DELLA protocol     #Microcytic anemia  -c/w DARLING    #Anxiety and depression                Revision History

## 2024-07-10 NOTE — PAYOR COMM NOTE
7/8 TO 7/10  ASKING FOR RE-REVIEW OF DOCUMENTS FOR RECONSIDERATION OF INPT    CONTINUED STAY REVIEW    Payor: [facility]  Subscriber #:  [id number]  Authorization Number: [id number]    Admit date: 7/5/24  Admit time:  4:59 PM  MEDICATIONS ADMINISTERED IN LAST 1 DAY:  amLODIPine (Norvasc) tab 10 mg       Date Action Dose Route User    7/9/2024 2108 Given 10 mg Oral Emi Lewis RN          heparin (Porcine) 5000 UNIT/ML injection 10,000 Units       Date Action Dose Route User    7/9/2024 1454 Given 10,000 Units Subcutaneous (Left Lower Abdomen) Lucy Briscoe RN          hydroCHLOROthiazide tab 25 mg       Date Action Dose Route User    7/9/2024 2108 Given 25 mg Oral Emi Lewis RN          losartan (Cozaar) tab 100 mg       Date Action Dose Route User    7/9/2024 2108 Given 100 mg Oral Emi Lewis RN          metoprolol succinate ER (Toprol XL) 24 hr tab 50 mg       Date Action Dose Route User    7/9/2024 2108 Given 50 mg Oral Emi Lewis RN          polyethylene glycol-electrolyte (Golytely) 236 g oral solution 4,000 mL       Date Action Dose Route User    7/9/2024 1720 Given 4,000 mL Oral Lucy Briscoe RN          sodium ferric gluconate (Ferrlecit) 125 mg in sodium chloride 0.9% 100mL IVPB premix       Date Action Dose Route User    7/9/2024 1454 New Bag 125 mg Intravenous Lucy Briscoe RN          topiramate (TopaMAX) tab 75 mg       Date Action Dose Route User    7/10/2024 0924 Given 75 mg Oral Zoë Becerril RN    7/9/2024 2108 Given 75 mg Oral Emi Lewis RN            Vitals (last day)       Date/Time Temp Pulse Resp BP SpO2 Weight O2 Device O2 Flow Rate (L/min) Who    07/10/24 0742 98.1 °F (36.7 °C) 68 18 114/69 98 % -- None (Room air) --     07/10/24 0500 98.4 °F (36.9 °C) 75 18 146/81 98 % -- None (Room air) 0 L/min AF    07/09/24 2300 98.7 °F (37.1 °C) 67 18 142/68 98 % -- None (Room air) 0 L/min AF    07/09/24 2108 -- 68 --  131/66 -- -- -- -- KT    07/09/24 2000 98.7 °F (37.1 °C) 94 18 141/73 98 % -- None (Room air) 0 L/min AF    07/09/24 1616 98.9 °F (37.2 °C) 70 18 134/73 99 % -- None (Room air) -- EE    07/09/24 1139 98.7 °F (37.1 °C) 73 18 113/69 97 % -- None (Room air) -- EE    07/09/24 0902 -- 81 -- -- 97 % -- -- -- EE    07/09/24 0733 98.1 °F (36.7 °C) 67 18 121/70 96 % -- None (Room air) -- EE    07/09/24 0350 98.8 °F (37.1 °C) 74 18 119/66 93 % -- None (Room air) -- KLAUS          7/8 HOSPITALIST NOTE      Subjective:  Patient states abdominal pain improved, denies nausea, vomiting, awaiting MRI.           Objective:  Review of Systems:   A comprehensive review of systems was completed; pertinent positive and negatives stated in subjective.     Vital signs:  Temp:  [98 °F (36.7 °C)-98.9 °F (37.2 °C)] 98.4 °F (36.9 °C)  Pulse:  [60-71] 60  Resp:  [14-20] 14  BP: (104-114)/(36-61) 114/51  SpO2:  [94 %-100 %] 94 %     Physical Exam:    General: No acute distress  Respiratory: No wheezes, no rhonchi  Cardiovascular: S1, S2, regular rate and rhythm  Abdomen: Soft, Non-tender, non-distended, positive bowel sounds  Neuro: No new focal deficits.   Extremities: No edema      Medications:   Scheduled Medications    heparin  10,000 Units Subcutaneous Q8H REX    pantoprazole  40 mg Intravenous Q12H    amLODIPine  10 mg Oral Nightly    hydroCHLOROthiazide  25 mg Oral Nightly    losartan  100 mg Oral Nightly    metoprolol succinate ER  50 mg Oral Nightly    topiramate  75 mg Oral BID    sodium ferric gluconate  125 mg Intravenous Daily                  Assessment & Plan:  #Abdominal pain due to pancreatic tail mass with liver lesions  -AFP and CA 19-9 wnl  -GI consult, PPi BID, pancreatic elastase, fecal fat  -Pain control, PRN antiemetics  -MRI Liver with Eovist pending     #Elevated LFTs due to above  -Trend, lower yesterday, repeat pending today  -Work up sent by GI     #Hx of CSF leak s/p patch and then s/p  shunt  -States  shunt needs  to be reset within 4 hours if she has MRI  -NSGY consult, sees Dr. Yanez     #Morbid obesity  -BMI is 55.13     #Hypertension  -Resume home meds  -controlled     #Migraines  -Sees Dr. Santos  -On Ubrelvy PRN (ok to bring home med)  -Topamax     #DELLA  -DELLA protocol     #Microcytic anemia  -c/w DARLING    #Anxiety and depression     7/8 GI NOTE  Reason for Consultation   Abdominal pain, abnormal imaging      Subjective   Feels better today compared to yesterday, less abdominal pain. No vomiting, GI bleeding. Reports getting heavy periods every month.                  Objective:      /51 (BP Location: Right arm)   Pulse 60   Temp 98.4 °F (36.9 °C) (Oral)   Resp 14   Ht 5' 7\" (1.702 m)   Wt (!) 352 lb (159.7 kg)   LMP 06/23/2024 (Approximate)   SpO2 94%   BMI 55.13 kg/m²   Gen: AAOx3  CV: RRR with normal S1 / S2  Resp: CTA bilaterally  Abd: (+)BS, soft, non-tender, non-distended; no rebound or guarding     Assessment   38 yo F who presents with upper abdominal pain. CT abdomen pelvis with low-attenuation lesion of the pancreatic tail along with multiple enhancing lesions of the liver.  Patient also with elevated LFTs which are downtrending at this time. Patient also with iron deficiency anemia with hemoglobin 9.4 with no active bleeding.           Plan   -MRCP/MR liver ordered  -Chronic liver disease workup negative/pending  -Can consider bidirectional endoscopy pending clinical progression and MRI findings  -Daily LFTs     7/8 NEUROSURGERY CONSULT NOTE    REASON FOR CONSULTATION:  VPS reprogramming     HISTORY OF PRESENT ILLNESS:  Honey Macias is a(n) 39 year old female with PMH of CSF leak repair with fat graft and LD placement on 2/3/23 with Dr. Quiñonez, and right  Shunt placement on 2/10/23, anxiety, depression, migraines, DELLA, obesity who presented to the hospital on 7/5/24 with c/o severe abdominal pain. As part of her work up, she is planned to have an MRI and Neurosurgery was consulted to  re-program shunt following imaging.      At her last office visit, her shunt was re-programmed to 1.5.       ASSESSMENT:  40 yo female with VPS currently admitted with abdominal pain.     Plan:  [employer] VPS re-programmed to 1.5 following MRI      Signed         The patient's shunt was reprogrammed to 1.5 following MRI.     She reports stable headaches and vision, and no recurrent leak.              7/9 GI NOTE    Reason for Consultation   Abdominal pain, abnormal imaging      Subjective   Feels better today, still having some mild-moderate diffuse intermittent abdominal pain. Endorses chronic intermittent abdominal pain, and alternating constipation/diarrhea, and a past diagnosis of IBS. Has never had EGD/colonoscopy. MRI shows possible pancreatic cyst vs adrenal lesion.                  Objective:      /70 (BP Location: Right arm)   Pulse 67   Temp 98.1 °F (36.7 °C) (Oral)   Resp 18   Ht 5' 7\" (1.702 m)   Wt (!) 352 lb (159.7 kg)   LMP 06/23/2024 (Approximate)   SpO2 96%   BMI 55.13 kg/m²   Gen: AAOx3  CV: RRR with normal S1 / S2  Resp: CTA bilaterally  Abd: (+)BS, soft, non-tender, non-distended; no rebound or guarding             Recent Labs    Lab 07/05/24  1221 07/06/24  0530 07/07/24  1311 07/08/24  1713     139 138 140    K 3.9 3.9 3.6 3.6     109 108 107    CO2 23.0 26.0 26.0 25.0    BUN 18 16 9 8*    CREATSERUM 1.01 1.18* 0.94 1.03*                   Recent Labs    Lab 07/05/24  1221 07/06/24  0530 07/07/24  1311 07/08/24  1713 07/09/24  0520    * 231* 143* 99* 83*    * 105* 31 14* 11*          Assessment   Ms Macias is a 40 yo F who presents with acute on chronic abdominal pain. CT abdomen pelvis with low-attenuation lesion of the pancreatic tail along with multiple enhancing lesions of the liver. MRI shows liver hemangiomas and a pancreatic cystic lesion vs adrenal lesion. Patient also with elevated LFTs which are downtrending at this time. Patient also with iron  deficiency anemia with hemoglobin 9.4 with no active bleeding. Her abdominal pain symptoms are most likely secondary to IBS-mixed, but Ddx includes IBD, celiac disease, PUD, or pancreatic lesion.         Plan   -continue to trend LFTs  -clear liquid diet today; Colonoscopy, EUS, and Upper Endoscopy with the assistance of MAC anesthesia on 7/10 for further evaluation-the risks, benefits and alternatives were discussed, including the risk of the preparation, anesthesia and the risk of perforation and bleeding with the procedure itself.  The risks of not proceeding were also discussed, including the risks of missing lesions including polyps or masses. The patient expresses an understanding and agrees to proceed as planned  -bentyl 10mg tid prn for abdominal pain  -pantoprazole 40mg po every day         7/9 HOSPITALIST NOTE    Subjective:  Patient with intermittent abdominal pain.           Objective:  Review of Systems:   A comprehensive review of systems was completed; pertinent positive and negatives stated in subjective.     Vital signs:  Temp:  [97.9 °F (36.6 °C)-98.9 °F (37.2 °C)] 98.7 °F (37.1 °C)  Pulse:  [67-81] 73  Resp:  [16-20] 18  BP: (113-125)/(61-72) 113/69  SpO2:  [93 %-100 %] 97 %     Physical Exam:    General: No acute distress  Respiratory: No wheezes, no rhonchi  Cardiovascular: S1, S2, regular rate and rhythm  Abdomen: Soft, Non-tender, non-distended, positive bowel sounds  Neuro: No new focal deficits.   Extremities: No edema             Recent Labs   Lab 07/06/24  0530 07/07/24  1311 07/08/24  1713 07/09/24  0520   * 78 91  --    BUN 16 9 8*  --    CREATSERUM 1.18* 0.94 1.03*  --    CA 8.5 8.9 8.8  --    ALB 3.2* 3.2* 3.4 3.2*    138 140  --    K 3.9 3.6 3.6  --     108 107  --    CO2 26.0 26.0 25.0  --    ALKPHO 187* 165* 153* 140*   * 31 14* 11*   * 143* 99* 83*   BILT 0.5 0.4 0.4 0.3   TP 7.1 7.1 7.5 7.1          Assessment & Plan:  #Abdominal pain due to  pancreatic tail mass with liver lesions  -AFP and CA 19-9 wnl  -GI consult, PPi daily  -LFTs improving  -Pain control, PRN antiemetics  -MRI Liver with Eovist reviewed, c/w liver hemangiomas, cysts, pancreatic cystic lesion vs adrenal lesion, recc repeat f/u to ensure stability  -colonoscopy, EUS, EGD per GI  -started on Bentyl     #Elevated LFTs due to above  -improved   -cont to monitor     #Hx of CSF leak s/p patch and then s/p  shunt  -States  shunt needs to be reset within 4 hours if she has MRI  -NSGY consult, sees Dr. Yanez     #Morbid obesity  -BMI is 55.13     #Hypertension  -Resume home meds  -controlled     #Migraines  -Sees Dr. Santos  -On Ubrelvy PRN (ok to bring home med)  -Topamax     #DELLA  -DELLA protocol     #Microcytic anemia  -c/w DARLING    #Anxiety and depression

## 2024-07-11 VITALS
HEART RATE: 78 BPM | DIASTOLIC BLOOD PRESSURE: 58 MMHG | SYSTOLIC BLOOD PRESSURE: 104 MMHG | BODY MASS INDEX: 45.99 KG/M2 | HEIGHT: 67 IN | WEIGHT: 293 LBS | OXYGEN SATURATION: 98 % | RESPIRATION RATE: 18 BRPM | TEMPERATURE: 99 F

## 2024-07-11 PROBLEM — E11.9 TYPE 2 DIABETES MELLITUS WITHOUT COMPLICATION, WITHOUT LONG-TERM CURRENT USE OF INSULIN (HCC): Status: ACTIVE | Noted: 2024-07-11

## 2024-07-11 LAB — A-1-ANTITRYPSIN: 160 MG/DL

## 2024-07-11 PROCEDURE — 99239 HOSP IP/OBS DSCHRG MGMT >30: CPT | Performed by: INTERNAL MEDICINE

## 2024-07-11 RX ORDER — DICYCLOMINE HYDROCHLORIDE 10 MG/1
10 CAPSULE ORAL
Qty: 90 CAPSULE | Refills: 0 | Status: SHIPPED | OUTPATIENT
Start: 2024-07-11

## 2024-07-11 NOTE — CONSULTS
[facility]      Patient Name:  Honey Macias   YOB: 1985   Gender:  Female   Appt Date:  7/11/2024   Provider:  HANANE Murillo     CHIEF COMPLAINT  Adrenal Mass     PROBLEMS  Reviewed   Patient Active Problem List   Diagnosis    Diastolic dysfunction    Allergic rhinitis due to other allergen    Other malaise and fatigue    Abscess    Acanthosis nigricans    Acute headache    Allergy    Anemia    Asthma (HCC)    Benign essential hypertension    Candidiasis    Elevated blood pressure    Gastroesophageal reflux disease    Iron deficiency anemia    Mixed anxiety and depressive disorder    Pharyngitis, acute    Psoriasis-eczema overlap condition    Seizure (HCC)    CSF leak from nose    CSF leak    IIH (idiopathic intracranial hypertension)    Hyperglycemia    Abdominal pain of unknown etiology    Pancreatic mass (HCC)    Liver masses    Elevated liver enzymes        History of Present Illness:  Patient is a 39 year old woman who we are currently being consulted for surgical oncology recommendations regarding an adrenal mass. Patient has a PMH including hypertension, anxiety, depression, CSF leak and  shunt from Idiopathic intracranial hypertension.     Patient reported to the ED on 7/5/2024 for abdominal pain, nausea, vomiting, constipation and diarrhea. She states she has abdominal pain at baseline, but progressed to significantly worse. Patient also has history of ovarian cysts that she states were removed in 2022. CT scan upon admission noted an exophytic lesion from the tail of the pancreas measuring 3.2 x 3.2 x 3.5 cm, as well as multiple enhancing liver nodules and large fibroids within the pelvis. GI was consulted and patient had MRI/MRCP to further evaluate the liver and pancreas lesion which showed liver lesions consistent with hemangiomas and an abdominal lesion positioned along the left adrenal gland. Patient then underwent EGD/EUS/Colonoscopy with Dr Gentile  where the lesion was noted to be 3.2 cm in size located between the left kifney and pancreas tail. Biopsies were taken --> adrenal cortical tissue and blood clot. Colonoscopy unremarkable. On further discussion with the patient, she has known about this adrenal mass since 2022. She states she has not had routine follow up with imaging for the lesion, but notes that it has been getting bigger on scans that are done. She denies prior lab workup for adrenal mass. Upon further review, patient has seen endocrinology in the past for which imaging and labs have been done, summarized below. She confirms hypertension and is taking 4 anti-hypertensive medications, controlled now. She confirms occasional palpitations and facial flushing. She denies abnormal weight loss in the last couple months. Family history consistent with a maternal grandmother with breast cancer and maternal aunt with pancreatic and lung cancer.        Vital Signs:  /58 (BP Location: Left arm)   Pulse 78   Temp 98.5 °F (36.9 °C) (Oral)   Resp 18   Ht 1.702 m (5' 7\")   Wt (!) 159.7 kg (352 lb)   LMP 06/23/2024 (Approximate)   SpO2 98%   BMI 55.13 kg/m²      Medications Reviewed:    Current Outpatient Medications:     dicyclomine 10 MG Oral Cap, Take 1 capsule (10 mg total) by mouth 3 (three) times daily before meals as needed., Disp: 90 capsule, Rfl: 0     Allergies Reviewed:  Allergies   Allergen Reactions    Bee Venom ANAPHYLAXIS    Triptans PALPITATIONS    Amoxicillin NAUSEA AND VOMITING and DIZZINESS    Penicillins NAUSEA AND VOMITING and DIZZINESS    Lisinopril Coughing and OTHER (SEE COMMENTS)        History:  Reviewed:  Past Medical History:    Anxiety    Asthma (HCC)    Back problem    Depression    Diastolic dysfunction    Extrinsic asthma, unspecified    High blood pressure    Lymphadenitis    Migraines    Obesity    Pituitary tumor    Pulmonary hypertension (HCC)    Seizure disorder (HCC)    Shortness of breath    Sleep apnea       Reviewed:  Past Surgical History:   Procedure Laterality Date    Adenoidectomy      Colonoscopy N/A 7/10/2024    Procedure: COLONOSCOPY;  Surgeon: Sascha Gentile MD;  Location:  ENDOSCOPY    Other surgical history  9-4-13 [facility]     Incision and Drainage of Deep Left Axillary Abscess    Other surgical history  02/03/2023    ENDOSCOPIC ENDONASAL CEREBROSPINAL FLUID LEAK REPAIR WITH ABDOMINAL FAT GRAFT    Other surgical history      Other surgical history  02/10/2023    RIGHT OCCIPITAL VENTRICULOPERITONEAL SHUNT INSERTION    Tonsillectomy        Reviewed Social History:  Social History     Socioeconomic History    Marital status: Single   Tobacco Use    Smoking status: Never     Passive exposure: Never    Smokeless tobacco: Never   Vaping Use    Vaping status: Never Used   Substance and Sexual Activity    Alcohol use: Yes     Comment: socially    Drug use: No   Other Topics Concern    Caffeine Concern No    Exercise Yes     Comment: walks     Social Determinants of Health     Food Insecurity: No Food Insecurity (7/5/2024)    Food Insecurity     Food Insecurity: Never true   Transportation Needs: No Transportation Needs (7/5/2024)    Transportation Needs     Lack of Transportation: No   Housing Stability: Low Risk  (7/5/2024)    Housing Stability     Housing Instability: No      Reviewed:  Family History   Problem Relation Age of Onset    Heart Disorder Father     Hypertension Mother       Review of Systems:  GENERAL HEALTH: feels well, no fatigue.   SKIN: no change in mole.   HEENT: denies pain  RESPIRATORY: shortness of breath with stairs, no wheezing or cough   CARDIOVASCULAR: + intermittent chest pain, SOB when walking up stairs, edema,orthopnea, + palpitations intermittent (weekly)  GI: + nausea, vomiting, constipation, diarrhea; no rectal bleeding  GENITAL/: no blood in urine  MUSCULOSKELETAL: no joint complaints, no back pain  NEURO: + numbness/tingling in extremities due to CSF  leak/shunt  ENDOCRINE: denies weight loss/gain  PSYCH: no mood changes       Physical Examination:  Constitutional:  NAD.   Eyes: Sclera: non-icteric.   Neck: Neck: supple.   Lungs: No increased work of breathing  Cardiovascular: RRR on tele  Abdomen: Soft, mild tenderness, mildly distended. No rigidity or rebound tenderness.   Musculoskeletal: Extremities: no edema.   Skin: Inspection and palpation: no jaundice.      Document Review:  7/5/2024 CT A/P:  CONCLUSION:       1. Low-attenuation lesion which appears to be exophytic inferiorly off of the tail the pancreas measuring 3.2 x by 3.2 by 3.5 cm.      2. Heterogeneously enhancing lesions within the liver which are indeterminate and should be further characterized using MRI with Eovist.      3. Diverticulosis without evidence of diverticulitis.      4. Uterine fibroids.     7/8/2024 MRI/MRCP:  LIVER:  Scattered hepatic lesions demonstrate T2 hyperintensity, peripheral globular enhancement, progressive centripetal enhancement, and relative washout on the 20 minutes delayed Eovist images compatible with multiple hepatic hemangiomas.  The largest    is in the left hepatic lobe on image 21 series 15 measuring up to 2.7 x 2.4 cm.  A 2nd dominant lesion in the anterior segment of the right hepatic lobe on image 13 series 15 measures up to 1.7 x 1.8 cm.  A 3rd dominant lesion in the posterior right   hepatic lobe on image 14 series 15 measures up to 1.9 x 1.6 cm.  There is a simple appearing cyst in the right hepatic lobe on image 24 series 15 measuring up to 1.1 x 0.5 cm.   BILIARY:  No visible dilatation or filling defect.  The common bile duct has a reference diameter of approximately 4 mm.   PANCREAS:  See below.   SPLEEN:  Splenomegaly measuring up to 15.8 cm.   KIDNEYS:  No mass or obstruction.    ADRENALS:  There is an indeterminate mixed signal intensity lesion that is positioned along the lateral limb of the left adrenal gland, abuts the anterior left renal  cortex, abuts some adjacent jejunal loops, and is positioned just inferior to the   pancreatic tail.  This lesion has a reference measurement of approximately 3.4 x 3.8 cm.  The lesion demonstrates signal dropout on in and out of phase imaging, mixed T2 signal, possible trace peripheral enhancement.  Differential considerations would   include a lipid rich adrenal adenoma, with a cystic pancreatic neoplasm, small bowel diverticulum, or other etiologies not entirely excluded.  Clinical correlation recommended.  Continued CT follow-up is suggested to confirm stability.     Final Diagnosis:   A.  Duodenum:  -Strips of superficial small intestinal mucosa without diagnostic abnormality.  -No morphologic evidence of celiac disease.  -No evidence of architectural distortion, dysplasia or malignancy.     B.  Gastric antrum:  -Superficial fragments of benign gastric antral type mucosa, no diagnostic abnormalities.  -Negative for Helicobacter, intestinal metaplasia, dysplasia, or malignancy.     C.  Esophagus biopsies:  -Strips of esophageal squamous mucosa without diagnostic abnormality.  -No increase in intraepithelial eosinophils.  -No evidence of intestinal metaplasia, dysplasia or malignancy.     D.  Intra-abdominal lesion, EUS guided fine-needle biopsy:  -Adrenal cortical tissue and blood clot.  -See comment.     E.  Random colon biopsies:  -Strips of colonic mucosa without diagnostic abnormality.  -No morphologic evidence of microscopic colitis.  -No evidence of architectural distortion, dysplasia or malignancy.     F.  Rectal polyp:  -Hyperplastic polyp.  -No evidence of dysplasia or malignancy.     PTA Images:  MR Abdomen WO IV Contrast 12/4/2023:  Adrenal glands: The right adrenal gland is unremarkable. 3.4 cm left adrenal nodule which demonstrates diffuse signal dropout on out of phase imaging, most likely representing a lipid rich adrenal adenoma.     Salivary Cortisol (11/05/2023 12:55 PM CST)    Lab Results -  Salivary Cortisol (11/05/2023 12:55 PM CST)  Component Value Ref Range Test Method Analysis Time Performed At Pathologist Signature   Cortisol, Saliva 0.285 ug/dL   11/09/2023 1:27 PM CST [facility]      Metanephrine <0.10 0.00 - 0.49 nmol/L   11/04/2023 3:38 PM CDT [facility]       Normetanephrine 0.37 0.00 - 0.89 nmol/L   11/04/2023 3:38 PM CDT [facility]       ACTH 31.0 7.2 - 63.3 pg/mL   11/02/2023 3:19 PM CDT [facility]       Renin Activity 12.4 ng/mL/hr   11/03/2023 3:18 PM CDT [facility]       CT A/P 10/2/2022:  ADRENAL GLANDS: Right adrenal gland appears normal. Low-density lesion likely arising from the left adrenal gland with suspected intrasubstance fat measuring 3.8 x 3.3 cm, suggestive of a myelolipoma. Findings could be confirmed with MRI.     CT A/P 9/5/2022:  Adrenal glands: Left upper quadrant nodule measuring 3.4 cm with possible small focus of fat, suggestive of an adrenal myelolipoma. This could be further characterized with follow-up MRI if performed. Normal right adrenal gland.      Procedure(s):  None     Assessment / Plan:  Left Adrenal Mass  - No acute surgical issues  - Findings on this admission reviewed with patient including imaging and biopsy results.   - This adrenal mass has been present on prior imaging and worked up by endocrinologist at  in 2023, felt to be benign neoplasm of adrenal gland/adrenal adenoma.  - Based on prior imaging in care everywhere, adrenal mass has been stable in size over the last 2 years. Original adrenal lab workup by outside provider WNL. Will repeat plasma metanephrines today due to hypertension, flushing and palpitations. Overall, low clinical suspicion for pheochromocytoma.   - Will review imaging at our upcoming multidisciplinary tumor board for further recommendations regarding imaging surveillance. Lesion is < 4 cm, no surgical intervention required at this time.   - Will follow up with her outpatient after tumor  board meeting for final imaging recommendations  - OK to discharge from surg onc standpoint  - Advised patient to follow up with endocrinology outpatient as well  - All questions answered. She expressed understanding.       Case discussed with Dr Willson  Electronically Signed by: HANANE Murillo

## 2024-07-11 NOTE — PROGRESS NOTES
Inpatient Follow up Note    Honey Macias Patient Status:  Inpatient    1985 MRN [id number]   Location [facility] 3SW-A Attending Maria L Kline MD   Hosp Day # 6 PCP Santos Espinosa MD     Reason for Consultation   Abdominal pain     Subjective   Abdominal pain improved, not present this am, tolerating po. No nausea/vomiting. Awaiting FNB results from intra-abdominal mass.             Objective:     /56 (BP Location: Left arm)   Pulse 71   Temp 98.5 °F (36.9 °C) (Oral)   Resp 18   Ht 5' 7\" (1.702 m)   Wt (!) 352 lb (159.7 kg)   LMP 2024 (Approximate)   SpO2 98%   BMI 55.13 kg/m²   Gen: AAOx3  CV: RRR with normal S1 / S2  Resp: CTA bilaterally  Abd: (+)BS, soft, non-tender, non-distended; no rebound or guarding  Ext: No edema or cyanosis  Skin: Warm and dry     Labs/Imaging     LABRCNTIP[PGLU:5@  Recent Labs   Lab 24  0531   INR 1.11         Recent Labs   Lab 24  1221 24  0530 24  1311 07/10/24  0508   WBC 4.3 4.7 4.8 5.5   HGB 10.3* 9.2* 9.4* 9.9*   .0 361.0 343.0 326.0       Recent Labs   Lab 24  1221 24  0530 24  1311 24  1713 07/10/24  0508    139 138 140 138   K 3.9 3.9 3.6 3.6 3.8    109 108 107 107   CO2 23.0 26.0 26.0 25.0 26.0   BUN 18 16 9 8* 7*   CREATSERUM 1.01 1.18* 0.94 1.03* 1.00       Recent Labs   Lab 24  0530 24  1311 24  1713 24  0520 07/10/24  0508   * 143* 99* 83* 78*   * 31 14* 11* 35     MRI ABDOMEN AND MRCP W/3D (W+W0)(CPT=74183/06993)    Result Date: 2024  CONCLUSION:   1. There is an indeterminate mixed signal intensity lesion that is positioned along the lateral limb of the left adrenal gland, abuts the anterior left renal cortex, abuts some adjacent jejunal loops, and is positioned just inferior to the pancreatic tail.  This lesion has a reference measurement of approximately 3.4 x 3.8 cm.  The lesion demonstrates signal dropout on in and  out of phase imaging, mixed T2 signal, possible trace peripheral enhancement.  Differential considerations would include a lipid rich adrenal adenoma, with a cystic pancreatic neoplasm, small bowel diverticulum, or other etiologies not entirely excluded.  Clinical correlation recommended.  Continued CT follow-up is suggested to confirm stability.  2. Multiple hepatic hemangiomas as above.  Additional right hepatic cyst.  3. Splenomegaly.  Please see above for further details.  LOCATION:  [facility]    Dictated by (CST): Farhan Osuna MD on 7/08/2024 at 5:39 PM     Finalized by (CST): Farhan Osuna MD on 7/08/2024 at 5:53 PM      AST   Date/Time Value Ref Range Status   07/10/2024 05:08 AM 35 15 - 37 U/L Final   09/04/2013 03:11 PM 7 (L) 15 - 41 U/L Final     ALT   Date/Time Value Ref Range Status   07/10/2024 05:08 AM 78 (H) 13 - 56 U/L Final   09/04/2013 03:11 PM 22 14 - 54 U/L Final     BUN   Date/Time Value Ref Range Status   07/10/2024 05:08 AM 7 (L) 9 - 23 mg/dL Final   09/07/2013 05:56 AM 12 8 - 20 mg/dL Final       7/10 EUS: FINDINGS:  ESOPHAGUS: Normal  GEJ: Normal  STOMACH: Normal  DUODENUM: Normal  MAJOR AMPULLA: Normal  ECHO: Imaging was performed through the esophagus, stomach and duodenum. The celiac axis and the left adrenal gland appear wnl, the visualized portions of the left hepatic lobe showed coarse echotexture, and small hyperechoic lesions that ;per imaging suggest hemangiomas, the visualized pancreatic parenchyma showed hyperechoic stranding and lobulation, the main PD appear wnl, the confluence of the portal vein, superior mesenteric vein and splenic vein appear wnl, there was a intra-abdominal mass between the left kidney and pancreas tail there was a 3.2 cm hyperechoic heterogeneous ovoid mass with small anechoic component and appears independent from surrounding organs   THERAPEUTICS: Cold forceps biopsies were perform ed from duodenum, antrum, esophagus, FNB, 22 G Acquire SincroPool  needle, 4 passes, multiple throws through gastric wall of intra abdominal mass    7/10 colonoscopy: QUALITY OF PREPARATION: Victor Bowel Preparation Scale:            -      Right colon 3, Transverse colon 3, Left colon 3   FINDINGS/THERAPEUTICS:  TERMINAL ILEUM: Normal  COLON: 2 mm rectal polyp s/p excisional biopsy with cold forceps, s/p random mucosal biopsies with cold forceps       Assessment   Ms Macias is a 40 yo F who presents with acute on chronic abdominal pain. CT abdomen pelvis with low-attenuation lesion near the pancreatic tail along with multiple enhancing lesions of the liver; MRI showed liver hemangiomas, and EUS showed an intra-abdominal mass with FNB biopsies pending. Patient also with iron deficiency anemia with hemoglobin 9.4 with no active bleeding; EGD/colonoscopy did not reveal a cause of DARLING. Her abdominal pain symptoms are most likely secondary to IBS-mixed.       Plan   -f/u EGD, colonoscopy, and EUS biopsies  -ok for regular diet  -bentyl 10mg tid prn  -pantoprazole 40mg every day  -we will help arrange outpatient GI f/u  -ok to DC from GI perspective    Addendum: FNB from EUS shows intra-abdominal mass biopsies were adrenal cortical tissue. Recommend consultation to endocrinology for further evaluation, we will place this order. Would await endo reccs prior to DC.    GI to sign off now. Re-consult with questions/concerns.     Nicko Mata MD  10:09 AM  7/11/2024  [facility]  [phone]

## 2024-07-11 NOTE — DIETARY NOTE
[facility]   part of [facility]   CLINICAL NUTRITION    Honey Macias     Admitting diagnosis:  Liver masses [R16.0]  Elevated liver enzymes [R74.8]  Pancreatic mass (HCC) [K86.89]  Abdominal pain of unknown etiology [R10.9]    Ht: 170.2 cm (5' 7\")  Wt: (!) 159.7 kg (352 lb).   Body mass index is 55.13 kg/m².  IBW: 61.4 kg    Wt Readings from Last 6 Encounters:   07/05/24 (!) 159.7 kg (352 lb)   07/05/24 (!) 159.7 kg (352 lb)   12/15/23 (!) 167.8 kg (370 lb)   12/04/23 (!) 167.8 kg (370 lb)   06/23/23 (!) 164.7 kg (363 lb)   06/23/23 (!) 164.7 kg (363 lb)        Labs/Meds reviewed  -(7/10):Glu:106, elevated LFT's  -Iron-IV    Diet:       Procedures    Regular/General diet Is Patient on Accuchecks? No     Percent Meals Eaten (last 3 days)       Date/Time Percent Meals Eaten (%)    07/10/24 1615 100 %     Percent Meals Eaten (%): jello and italian ice at 07/10/24 1615    07/11/24 1252 100 %          Pt chart reviewed d/t length of stay.    -S/p colonoscopy; bx of rectal polyp on 7/10.   -S/p EUS/EGD and FNB on 7/10.  Per GI note, FNB from EUS revealed intra-abdominal mass biopsies were adrenal cortical tissue.     Patient reports fair appetite at this time. Tolerating diet.  Nursing notes reports Percent Meals Eaten (%): 100 % intake for last meal.  Currently on Regular diet. Pt w/ intermittent NPO/Clear Liquid status this admit.   Pt w/ chronic abdominal pain intensifying over the past 3 months. Reported pain is back to baseline. Experiences intermittent nausea.  Last BM:7/10. Skin intact.  If pt is unable to finish meals, discussed low calorie, high protein ONS options for at home. Ok for a regular diet per GI. Discussed softer food options, if better tolerated.    No significant weight changes noted per EMR hx. Pt stated she has been trying to loose wt. Denied any wt loss related to illness.    PMH:morbid obesity, DELLA, HTN, CSF leak, s/p patch and then s/p  shunt, seizure disorder, pituitary tumor.       Patient is at low nutrition risk at this time.    Please consult if patient status changes or nutrition issues arise.    Malorie Grace MS, RD, LDN  Clinical Dietitian  Ext:[phone]

## 2024-07-11 NOTE — PLAN OF CARE
Rec'd order to consult Endocrinology. Called Dr. Argueta's office and was informed they do not do inpatient consults. LAM BROTHERS advised.      Dr. Osman paged with new consult, read by PA's x 2.

## 2024-07-11 NOTE — DISCHARGE SUMMARY
[facility]  Discharge Summary    Honey Macias Patient Status:  Inpatient    1985 MRN [id number]   Location [facility] 3SW-A Attending No att. providers found   Hosp Day # 6 PCP Santos Espinosa MD     Date of Admission: 2024    Date of Discharge: 2024     Disposition: Home or Self Care    Discharge Diagnosis:   #Abdominal pain due to pancreatic tail mass with liver lesions  # Left adrenal mass, history of adrenal adenoma     #Elevated LFTs due to above    #Hx of CSF leak s/p patch   # History of prior  shunt    #Morbid obesity  -BMI is 55.13     #Hypertension    #Migraines    #DELLA    #Microcytic anemia    #Anxiety and depression       Chief Complaint:   Chief Complaint   Patient presents with    Abdomen/Flank Pain       History of Present Illness:   Honey Macias is a 39 year old female with PMHx anxiety, depression, migraines, hypertension, DELLA, morbid obesity, CSF leak s/p patch and then s/p  shunt who presents to the hospital for evaluation of abdominal pain. She has had intermittent abdominal pain for the past 3 months but has been more severe and constant since last night. She describes it as a stabbing sensation in the todd-umbilical region. She has associated nausea and vomiting. She denies any melena or hematochezia. No fever, chills, chest pain, SOB or dysuria. BM change between constipation and diarrhea. No weight loss or night sweats.  In the ER, CT a/p shows pancreatic tail mass with liver lesions. Her LFTs are elevated with normal bilirubin.   Please refer to history and physical done by Dr. Eleazar Salmeron for details of admission    Brief Synopsis:   #Abdominal pain due to pancreatic tail mass with liver lesions  -AFP and CA 19-9 wnl  -Seen by GI consult  -Treated with PPi daily  -LFTs improving  -Pain control, PRN antiemetics  -MRI Liver with Eovist showed possible liver hemangiomas, cysts, pancreatic cystic lesion vs adrenal lesion, this was discussed with patient in  detail, advised follow-up with GI and his regular outpatient primary care physician as outpatient for follow-up on this, patient verbalized understanding  -s/p colonoscopy, EUS, EGD  by GI Dr Gentile on 7/10, colonoscopy showed 2 mm rectal polyp status post excisional biopsy with cold forceps and status post random mucosal biopsies with cold forceps by GI, EGD with EUS showed intra-abdominal mass status post cold forcep biopsies performed from duodenal antrum and esophagus and fine-needle biopsy through gastric wall with intra-abdominal mass done by GI Dr. Gentile on 7/10/2024.  Fine-needle biopsy from EUS showed intra-abdominal mass biopsies by adrenal cortical tissue according to GI who consulted surgical oncologist and recommended endocrinology consultation.  Endocrinologist advised patient to follow-up with endocrinology team as outpatient.  Seen by surgical oncologist who advised that patient has no acute surgical issues at this time.  Patient has history of left adrenal mass which has been present on prior imaging and was previously worked up by outpatient endocrinologist and felt to be benign neoplasm of adrenal gland/adrenal adenoma at that time as advised by surgical oncologist.  Surgical oncology team plans to follow-up with patient outpatient after tumor board meeting for final imaging recommendations.  Okay for discharge from surgical oncology standpoint also today.  -GI signed off today.  -on Bentyl     #Elevated LFTs due to above  -Liver function test improving since admission  -Seen by GI on consult     #Hx of CSF leak s/p patch and then s/p  shunt  -States  shunt needs to be reset within 4 hours if she has MRI  -Seen by neurosurgery consult,  Dr. Yanez whom patient follows up with as outpatient regarding the same while in hospital.     #Morbid obesity  -BMI is 55.13     #Hypertension  -Continued home meds  -controlled     #Migraines  -Sees Dr. Santos  -Continued on home Ubrelvy PRN (ok to  bring home med)  -[facility]     #DELLA  -DELLA protocol     #Microcytic anemia  -c/w DARLING    #Anxiety and depression     pt states she has a endocrinologist at [facility] that she follows up for her enlarged adrenal gland, advice f/u with endocrinology as directed by GI. F/u with GI for f/u on biopsy results. F/u with surg onco as directed, f/u with reg OP PCP Santos Espinosa MD in 1 week           TCM Diagnosis at discharge from Hospital: Other: See above; still recommend for TCM follow-up    Lace+ Score: 51  59-90 High Risk  29-58 Medium Risk  0-28   Low Risk.     TCM Follow-Up Recommendation:Honey Macias   LACE 29-58: Moderate Risk of readmission after discharge from the hospital.      PCP: Santos Espinosa MD    Consultations:   Consultants         Provider   Role Specialty     Leonardo Osman MD      Consulting Physician Surgical Oncology     Gabrielle Argueta MD      Consulting Physician ENDOCRINOLOGY     Ted Yanez MD      Consulting Physician NEUROSURGERY     Winsome Guardado DO      Consulting Physician GASTROENTEROLOGY              Procedures during hospitalization:   EGD with biopsy, endoscopic ultrasound EUS with fine-needle biopsy, colonoscopy with biopsy done by GI Dr. Sascha Gentile on 7/10/2024    Incidental or significant findings and recommendations (brief descriptions):  None    Lab/Test results pending at Discharge:   None      Discharge Medications:        Discharge Medications        START taking these medications        Instructions Prescription details   dicyclomine 10 MG Caps  Commonly known as: Bentyl      Take 1 capsule (10 mg total) by mouth 3 (three) times daily before meals as needed.   Quantity: 90 capsule  Refills: 0            CONTINUE taking these medications        Instructions Prescription details   amLODIPine 10 MG Tabs  Commonly known as: Norvasc      daily.   Refills: 0     hydroCHLOROthiazide 25 MG Tabs      Take 1 tablet (25 mg total) by mouth daily.   Refills: 0     losartan 100  MG Tabs  Commonly known as: Cozaar      Take 1 tablet (100 mg total) by mouth daily.   Refills: 0     metoprolol succinate ER 50 MG Tb24  Commonly known as: Toprol XL      Take 1 tablet (50 mg total) by mouth daily.   Refills: 0     topiramate 25 MG Tabs  Commonly known as: TopaMAX      Take 3 tablets (75 mg total) by mouth 2 (two) times daily.   Quantity: 180 tablet  Refills: 2     Ubrelvy 50 MG Tabs  Generic drug: ubrogepant      Take one tablet at onset of migraine.  May take additional tablet in 2 hours if needed.  Do not exceed two tablets per 24 hour period.   Quantity: 10 tablet  Refills: 5               Where to Get Your Medications        Please  your prescriptions at the location directed by your doctor or nurse    Bring a paper prescription for each of these medications  dicyclomine 10 MG Caps          [place] prescription monitoring program data reviewed in epic before prescribing narcotics/controlled substances: Not applicable as no narcotics prescribed    Follow up Visits: Follow-up with Santos Espinosa MD in 1 week    Winsome Guardado DO  [facility] [facility] [address] [phone]    Go in 3 week(s)  for a follow-up office visit with GI. The office will call you to arrange date/time of visit    Santos Espinosa MD  [address] [facility] [address] [phone]    Follow up in 1 week(s)      Ashlie Romero MD  [address] [facility]  [address]  [facility] [address] [phone]    Schedule an appointment as soon as possible for a visit  or follow up with your regular endocrinologist at [facility] as OP in 1 week    Appointments for Next 30 Days 7/11/2024 - 8/10/2024      None                Physical Exam:  /58 (BP Location: Left arm)   Pulse 78   Temp 98.5 °F (36.9 °C) (Oral)   Resp 18   Ht 5' 7\" (1.702 m)   Wt (!) 352 lb (159.7 kg)   LMP 06/23/2024 (Approximate)   SpO2 98%   BMI 55.13 kg/m²     General: No acute distress  Respiratory: Clear to auscultation bilateral, no  wheezes, no rhonchi  Cardiovascular: S1, S2, regular rate and rhythm  Abdomen: Soft, Non-tender, non-distended, positive bowel sounds  Neuro: No new focal deficits.   Extremities: No pedal edema       Discharge Condition: stable    Patient Discharge Instructions: See electronic chart      More than 30 minutes on discharge    Maria L Kline MD  7/11/2024

## 2024-07-11 NOTE — PLAN OF CARE
Patient A&Ox4 . Vital signs stable ,tele with sinus rhythm . On room air .  On seizure precautions . Denies any abdominal pain , tolerating regular diet . Voiding in bathroom . Reminded to call for any needs . Will continue to monitor . Plan for home when cleared .

## 2024-07-11 NOTE — PLAN OF CARE
A&O x 4. VSS. On RA/DELLA no CPAP. Tele-NSR. No c/o pain this morning. Up at [facility] to bathroom. Voiding without issue. Plan for dc today. Reviewed POC with pt. Will continue to monitor.

## 2024-07-11 NOTE — PROGRESS NOTES
[facility]   part of [facility]     Hospitalist Progress Note     Honey Macias Patient Status:  Inpatient    1985 MRN [id number]   Location [facility] 3SW-A Attending Liv Beltre MD   Hosp Day # 6 PCP Santos Espinosa MD     Chief Complaint: Abdominal pain    Subjective:     Patient with intermittent diffuse 4 out of 10 abdominal pain.  Eager to go home, pt states she has a endocrinologist at [facility] that she follows up for her enlarged adrenal gland.    Objective:    Review of Systems:   A comprehensive review of systems was completed; pertinent positive and negatives stated in subjective.    Vital signs:  Temp:  [98 °F (36.7 °C)-98.5 °F (36.9 °C)] 98.5 °F (36.9 °C)  Pulse:  [71-98] 78  Resp:  [16-18] 18  BP: (102-141)/(56-78) 104/58  SpO2:  [95 %-98 %] 98 %    Physical Exam:   /58 (BP Location: Left arm)   Pulse 78   Temp 98.5 °F (36.9 °C) (Oral)   Resp 18   Ht 5' 7\" (1.702 m)   Wt (!) 352 lb (159.7 kg)   LMP 2024 (Approximate)   SpO2 98%   BMI 55.13 kg/m²      General: No acute distress  Respiratory: Clear to auscultation bilateral, no wheezes, no rhonchi  Cardiovascular: S1, S2, regular rate and rhythm  Abdomen: Soft, Non-tender, non-distended, positive bowel sounds  Neuro: No new focal deficits.   Extremities: No pedal edema      Diagnostic Data:    Labs:  Recent Labs   Lab 24  1221 24  0530 24  0531 24  1311 07/10/24  0508   WBC 4.3 4.7  --  4.8 5.5   HGB 10.3* 9.2*  --  9.4* 9.9*   MCV 73.7* 76.3*  --  74.8* 74.3*   .0 361.0  --  343.0 326.0   INR  --   --  1.11  --   --        Recent Labs   Lab 24  1311 24  1713 24  0520 07/10/24  0508   GLU 78 91  --  106*   BUN 9 8*  --  7*   CREATSERUM 0.94 1.03*  --  1.00   CA 8.9 8.8  --  9.0   ALB 3.2* 3.4 3.2* 3.3*    140  --  138   K 3.6 3.6  --  3.8    107  --  107   CO2 26.0 25.0  --  26.0   ALKPHO 165* 153* 140* 144*   AST 31 14* 11* 35   * 99* 83* 78*    BILT 0.4 0.4 0.3 0.6   TP 7.1 7.5 7.1 7.7       Estimated Creatinine Clearance: 74.9 mL/min (based on SCr of 1 mg/dL).    No results for input(s): \"TROP\", \"TROPHS\", \"CK\" in the last 168 hours.    Recent Labs   Lab 07/06/24  0531   PTP 14.3   INR 1.11                  Microbiology    No results found for this visit on 07/05/24.      Imaging: Reviewed in Epic.  MRI of the abdomen with MRCP showed  CONCLUSION:       1. There is an indeterminate mixed signal intensity lesion that is positioned along the lateral limb of the left adrenal gland, abuts the anterior left renal cortex, abuts some adjacent jejunal loops, and is positioned just inferior to the pancreatic   tail.  This lesion has a reference measurement of approximately 3.4 x 3.8 cm.  The lesion demonstrates signal dropout on in and out of phase imaging, mixed T2 signal, possible trace peripheral enhancement.  Differential considerations would include a   lipid rich adrenal adenoma, with a cystic pancreatic neoplasm, small bowel diverticulum, or other etiologies not entirely excluded.  Clinical correlation recommended.  Continued CT follow-up is suggested to confirm stability.      2. Multiple hepatic hemangiomas as above.  Additional right hepatic cyst.      3. Splenomegaly.     These results were discussed with the patient and advised follow-up with gastroenterologist  regarding the same    Medications:    pantoprazole  40 mg Oral QAM AC    heparin  10,000 Units Subcutaneous Q8H REX    amLODIPine  10 mg Oral Nightly    hydroCHLOROthiazide  25 mg Oral Nightly    losartan  100 mg Oral Nightly    metoprolol succinate ER  50 mg Oral Nightly    topiramate  75 mg Oral BID    sodium ferric gluconate  125 mg Intravenous Daily       Assessment & Plan:      #Abdominal pain due to pancreatic tail mass with liver lesions  -AFP and CA 19-9 wnl  -GI consult, PPi daily  -LFTs improving  -Pain control, PRN antiemetics  -MRI Liver with Eovist showed possible liver  hemangiomas, cysts, pancreatic cystic lesion vs adrenal lesion, this was discussed with patient in detail, advised follow-up with GI and his regular outpatient primary care physician as outpatient for follow-up on this, patient verbalized understanding  -s/p colonoscopy, EUS, EGD planned by GI Dr Gentile on 7/10, GI signed off today.  -on Bentyl     #Elevated LFTs due to above  -Liver function test improving since admission  -Seen by GI on consult  -cont to monitor     #Hx of CSF leak s/p patch and then s/p  shunt  -States  shunt needs to be reset within 4 hours if she has MRI  -NSGY consult, sees Dr. Yanez     #Morbid obesity  -BMI is 55.13     #Hypertension  -Resume home meds  -controlled     #Migraines  -Sees Dr. Santos  -On Ubrelvy PRN (ok to bring home med)  -Topamax     #DELLA  -DELLA protocol     #Microcytic anemia  -c/w DARLING    #Anxiety and depression    pt states she has a endocrinologist at [facility] that she follows up for her enlarged adrenal gland, advice f/u with endocrinology as directed by GI. F/u with GI for f/u on biopsy results. F/u with surg onco as directed, f/u with reg OP PCP Santos Espinosa MD in 1 week     Maria L Kline MD  7/11/2024        Supplementary Documentation:     Quality:  DVT Mechanical Prophylaxis:   SCDs,    DVT Pharmacologic Prophylaxis   Medication    heparin (Porcine) 5000 UNIT/ML injection 10,000 Units                Code Status: Full Code  Marrufo: No urinary catheter in place  Marrufo Duration (in days):   Central line:    LAURA: 7/12/2024    Discharge is dependent on: progress  At this point Ms. Macias is expected to be discharge to: home    The 21st Century Cures Act makes medical notes like these available to patients in the interest of transparency. Please be advised this is a medical document. Medical documents are intended to carry relevant information, facts as evident, and the clinical opinion of the practitioner. The medical note is intended as peer to peer communication  and may appear blunt or direct. It is written in medical language and may contain abbreviations or verbiage that are unfamiliar.

## 2024-07-11 NOTE — DISCHARGE PLANNING
DC instructions reviewed with pt. All questions answered. IV removed. Belongings sent with pt. DC home via personal car with spouse.

## 2024-07-12 LAB — PANCREATIC ELAST FECAL: >800 UG ELAST./G

## 2024-07-13 ENCOUNTER — APPOINTMENT (OUTPATIENT)
Dept: ULTRASOUND IMAGING | Facility: HOSPITAL | Age: 39
End: 2024-07-13
Attending: STUDENT IN AN ORGANIZED HEALTH CARE EDUCATION/TRAINING PROGRAM
Payer: MEDICAID

## 2024-07-13 ENCOUNTER — HOSPITAL ENCOUNTER (EMERGENCY)
Facility: HOSPITAL | Age: 39
Discharge: HOME | End: 2024-07-13
Attending: STUDENT IN AN ORGANIZED HEALTH CARE EDUCATION/TRAINING PROGRAM
Payer: MEDICAID

## 2024-07-13 VITALS
SYSTOLIC BLOOD PRESSURE: 124 MMHG | HEIGHT: 67 IN | RESPIRATION RATE: 18 BRPM | DIASTOLIC BLOOD PRESSURE: 45 MMHG | BODY MASS INDEX: 45.99 KG/M2 | OXYGEN SATURATION: 100 % | TEMPERATURE: 97 F | WEIGHT: 293 LBS | HEART RATE: 70 BPM

## 2024-07-13 DIAGNOSIS — I80.8 THROMBOPHLEBITIS ARM: Primary | ICD-10-CM

## 2024-07-13 PROCEDURE — 99284 EMERGENCY DEPT VISIT MOD MDM: CPT

## 2024-07-13 PROCEDURE — 99283 EMERGENCY DEPT VISIT LOW MDM: CPT

## 2024-07-13 PROCEDURE — 93971 EXTREMITY STUDY: CPT | Performed by: STUDENT IN AN ORGANIZED HEALTH CARE EDUCATION/TRAINING PROGRAM

## 2024-07-13 NOTE — ED INITIAL ASSESSMENT (HPI)
Pt was discharged from the hospital on Thursday. Pt states swelling at IV site.  Pt noting \"knots\" above site.  Lumps noted on bilateral AC.  Pt denies fevers.

## 2024-07-14 NOTE — ED PROVIDER NOTES
History     Chief Complaint   Patient presents with    Skin Problem       HPI    39 year old female presents for evaluation.  Patient was recently admitted to the hospital, discharged 2 days ago, she initially had an IV in the left antecubital fossa which was then removed and placed on the right.  She states she had some swelling in the left AC which has been surgical down but she has persistent swelling in the right.  She has some pain and can feel a lump site where the IV was.  No redness or fevers.  No weakness or paresthesias.  No prior DVT.          Past Medical History:    Anxiety    Asthma (HCC)    Back problem    Depression    Diastolic dysfunction    Extrinsic asthma, unspecified    High blood pressure    Lymphadenitis    Migraines    Obesity    Pituitary tumor    Pulmonary hypertension (HCC)    Seizure disorder (HCC)    Shortness of breath    Sleep apnea       Past Surgical History:   Procedure Laterality Date    Adenoidectomy      Colonoscopy N/A 7/10/2024    Procedure: COLONOSCOPY;  Surgeon: Sascha Gentile MD;  Location:  ENDOSCOPY    Other surgical history  9-4-13 [facility]     Incision and Drainage of Deep Left Axillary Abscess    Other surgical history  02/03/2023    ENDOSCOPIC ENDONASAL CEREBROSPINAL FLUID LEAK REPAIR WITH ABDOMINAL FAT GRAFT    Other surgical history      Other surgical history  02/10/2023    RIGHT OCCIPITAL VENTRICULOPERITONEAL SHUNT INSERTION    Tonsillectomy         Social History     Socioeconomic History    Marital status: Single   Tobacco Use    Smoking status: Never     Passive exposure: Never    Smokeless tobacco: Never   Vaping Use    Vaping status: Never Used   Substance and Sexual Activity    Alcohol use: Yes     Comment: socially    Drug use: No   Other Topics Concern    Caffeine Concern No    Exercise Yes     Comment: melissa     Social Determinants of Health     Food Insecurity: No Food Insecurity (7/5/2024)    Food Insecurity     Food Insecurity: Never true    Transportation Needs: No Transportation Needs (7/5/2024)    Transportation Needs     Lack of Transportation: No   Housing Stability: Low Risk  (7/5/2024)    Housing Stability     Housing Instability: No                   Physical Exam     ED Triage Vitals [07/13/24 1859]   BP 91/66   Pulse 98   Resp 16   Temp 97 °F (36.1 °C)   Temp src Temporal   SpO2 100 %   O2 Device None (Room air)       Physical Exam  Constitutional:       General: She is not in acute distress.  Musculoskeletal:      Comments: Palpable tender cord along bilateral AC with very mild soft tissue swelling on the right, no erythema or induration.   Neurological:      Mental Status: She is alert.              ED Course     Labs Reviewed - No data to display  No results found.        MDM     Vitals:    07/13/24 1859 07/13/24 2108   BP: 91/66 125/42   Pulse: 98 75   Resp: 16 18   Temp: 97 °F (36.1 °C)    TempSrc: Temporal    SpO2: 100% 100%   Weight: (!) 159.7 kg    Height: 170.2 cm (5' 7\")        Exam most c/w superficial thrombophlebitis. No e/o infx.  Will get ultrasound for further eval of dvt.  Us pending, endorsed to dr slater.           Disposition and Plan     Clinical Impression:  1. Thrombophlebitis arm        Disposition:  There is no disposition on file for this visit.    Follow-up:  Santos Espinosa MD  [address] [facility] [id number] [phone]    Follow up        Medications Prescribed:  Current Discharge Medication List        START taking these medications    Details   diclofenac (VOLTAREN) 1 % External Gel Apply 2 g topically 4 (four) times daily for 7 days.  Qty: 100 g, Refills: 0

## 2024-07-14 NOTE — ED PROVIDER NOTES
US VENOUS DOPPLER ARM RIGHT - DIAG IMG (CPT=93971)    Result Date: 7/13/2024  PROCEDURE:  US VENOUS DOPPLER ARM RIGHT - DIAG IMG (CPT=93971)  COMPARISON:  None.  INDICATIONS:  eval for DVT  TECHNIQUE:  Real time, grey scale, and duplex ultrasound was used to evaluate the upper extremity venous system. B-mode two-dimensional images of the vascular structures, Doppler spectral analysis, and color flow.  Doppler imaging were performed.  The following veins were imaged:  Subclavian, Jugular, Axillary, Brachial, Basilic, Cephalic, and the contralateral Subclavian and Jugular.  PATIENT STATED HISTORY: (As transcribed by Technologist)     FINDINGS:  EXTREMITY:  Bilateral upper extremities THROMBI:  Partially occlusive thrombus is noted in the right cephalic vein.  There is suggestion of an IV in the right cephalic vein.  No evidence of deep vein thrombosis in the bilateral internal jugular, subclavian, axillary, or brachial veins COMPRESSION:  Normal compressibility, phasicity, and augmentation of the subclavian, jugular, axillary, brachial, and cephalic veins.  Superficial vein thrombophlebitis is noted in the cephalic vein. OTHER:  Negative.            CONCLUSION:  1. Superficial vein thrombophlebitis in the right cephalic vein. 2. No evidence of DVT in the bilateral upper extremities.   LOCATION:  [facility]   Dictated by (CST): Jaron Bryant MD on 7/13/2024 at 10:55 PM     Finalized by (CST): Jaron Bryant MD on 7/13/2024 at 10:56 PM       Ultrasound reviewed.  No evidence for DVT.  Patient be sent home with NSAIDs per Dr. Schneider recommendation who saw patient primarily

## 2024-07-17 DIAGNOSIS — R19.7 DIARRHEA, UNSPECIFIED TYPE: Primary | ICD-10-CM

## 2024-07-17 LAB — FATS NEUTRAL: NORMAL

## 2024-07-18 ENCOUNTER — TELEPHONE (OUTPATIENT)
Dept: SURGERY | Facility: CLINIC | Age: 39
End: 2024-07-18

## 2024-07-18 NOTE — TELEPHONE ENCOUNTER
Discussed tumor board recommendations with patient. Since adrenal mass has been stable and worked up by her endocrinologist outpatient, it can continue to be observed. She expressed understanding. Advised her to continue to follow up with her endocrinologist outpatient for observation imaging. She agreed.     HANANE Murillo

## 2024-08-21 ENCOUNTER — TELEPHONE (OUTPATIENT)
Dept: NEUROLOGY | Facility: CLINIC | Age: 39
End: 2024-08-21

## 2024-11-03 DIAGNOSIS — G43.019 INTRACTABLE MIGRAINE WITHOUT AURA AND WITHOUT STATUS MIGRAINOSUS: Primary | ICD-10-CM

## 2024-11-04 RX ORDER — UBROGEPANT 50 MG/1
TABLET ORAL
Qty: 10 TABLET | Refills: 0 | Status: SHIPPED | OUTPATIENT
Start: 2024-11-04

## 2024-11-04 NOTE — TELEPHONE ENCOUNTER
Medication:  UBRELVY 50 MG Oral Tab      Date of last refill: 12/15/2023 (#10/5)  [place]     Last office visit: 12/15/2023  Due back to clinic per last office note:  Around 05/15/2024  Date next office visit scheduled:    No future appointments.        Last OV note recommendation:    Plan:  Ubrelvy prn  Topamax started, reviewed side effects and advised hydration  Neurosurgery to follow  Headache diary advised  Migraine and Medication overuse headache education given  See orders and medications filed with this encounter. The patient indicates understanding of these issues and agrees with the plan.  Discussed with patient regarding assessment, work up, care plan   RTC 4-6 months, update in 6 weeks requested  Pt should go ER for any new or worsening symptoms and contact office

## 2025-05-28 ENCOUNTER — OFFICE VISIT (OUTPATIENT)
Dept: NEUROLOGY | Facility: CLINIC | Age: 40
End: 2025-05-28
Payer: MEDICAID

## 2025-05-28 VITALS
RESPIRATION RATE: 16 BRPM | HEART RATE: 98 BPM | DIASTOLIC BLOOD PRESSURE: 80 MMHG | WEIGHT: 293 LBS | OXYGEN SATURATION: 98 % | BODY MASS INDEX: 45.99 KG/M2 | SYSTOLIC BLOOD PRESSURE: 132 MMHG | HEIGHT: 67 IN

## 2025-05-28 DIAGNOSIS — G96.00 CSF LEAK: ICD-10-CM

## 2025-05-28 DIAGNOSIS — G43.019 INTRACTABLE MIGRAINE WITHOUT AURA AND WITHOUT STATUS MIGRAINOSUS: Primary | ICD-10-CM

## 2025-05-28 PROCEDURE — 99215 OFFICE O/P EST HI 40 MIN: CPT | Performed by: OTHER

## 2025-05-28 RX ORDER — BUTALBITAL, ACETAMINOPHEN AND CAFFEINE 50; 325; 40 MG/1; MG/1; MG/1
1 TABLET ORAL EVERY 4 HOURS PRN
Qty: 10 TABLET | Refills: 0 | Status: SHIPPED | OUTPATIENT
Start: 2025-05-28

## 2025-05-28 RX ORDER — TOPIRAMATE 100 MG/1
100 TABLET, FILM COATED ORAL 2 TIMES DAILY
Qty: 60 TABLET | Refills: 3 | Status: SHIPPED | OUTPATIENT
Start: 2025-05-28

## 2025-05-28 NOTE — PROGRESS NOTES
Patient following up on headaches       The following individual(s) verbally consented to be recorded using ambient AI listening technology and understand that they can each withdraw their consent to this listening technology at any point by asking the clinician to turn off or pause the recording:    Patient name: Honey Macias  Additional names:

## 2025-05-28 NOTE — PROGRESS NOTES
[facility] Neurology Outpatient Progress Note  Date of service: 5/28/2025      The following individual(s) verbally consented to be recorded using ambient AI listening technology and understand that they can each withdraw their consent to this listening technology at any point by asking the clinician to turn off or pause the recording:    Patient name: Honey Macias      ICD-10-CM    1. Intractable migraine without aura and without status migrainosus  G43.019 butalbital-acetaminophen-caffeine -40 MG Oral Tab      2. CSF leak  G96.00       Chronic daily headache, likely combination of tension type and migraine headache  CSF leak s/p patch  S/p  shunt  migraine without aura and without status migrainosus     Assessment & Plan  Migraine headaches worsened. Poor sleep contributes; Topamax ongoing, Ubrelvy ineffective. Discussed hydration to prevent nephrolithiasis with Topamax. Considered increasing Topamax to 100 mg twice daily. Emphasized avoiding overuse of rescue medication.  - Prescribe Topamax 100 mg tablets, one tablet twice daily for one month.  - Monitor for side effects, follow-up in four weeks.  - Discontinue Ubrelvy (would avoid gepant due to h/o elevated LFT).  - Prescribe Fioricet  as new rescue medication.  - Advise against overuse of rescue medication.  - endocrinology appointment in July.     See orders and medications filed with this encounter. The patient indicates understanding of these issues and agrees with the plan.  Discussed with patient/family regarding assessment, work up, care plan and possible adverse and side effects of the medications.  RTC 4-5 months, update in 4 weeks requested  Pt should go ER for any new or worsening symptoms  A total of 40 minutes were spent on patient care,  more than 50% was spent counseling patient/family regarding studies' results, assessment, treatment options and care plan, 10 minutes were spent in (pre- and/or during visit) reviewing clinical data including  imaging, labs and progress notes related to therapy or treatment.      Subjective:   History of Present Illness  Ms. Honey Macias is a 40 year old female with migraines who presents with worsening headaches.    Her migraines have worsened, and she is unsure if this is related to her poor sleep. She is currently taking Topamax, six pills a day, three in the morning and three in the evening. She drinks at least four bottles of water daily. She has tried Ubrelvy for her migraines, which initially helped, but her headaches have since worsened. She has not experienced any benefit from this medication recently.    She has a history of adrenal gland issues and is scheduled to see an endocrinologist in July to assess if there is any growth.   No issues with balance or vision. She has not experienced weight loss from Topamax, attributing this to stress eating and poor sleep.    History/Other:   REVIEW OF SYSTEMS:  A 10-point system was reviewed. Pertinent positives and negatives are noted as above     Medications - Current[1]  Allergies:  Allergies[2]  Past Medical History[3]  Past Surgical History[4]  Social History:  Social History     Tobacco Use    Smoking status: Never     Passive exposure: Never    Smokeless tobacco: Never   Substance Use Topics    Alcohol use: Yes     Comment: socially     Family History[5]   Patient denies any pertinent family history associated with the current problem    Objective:   Neurological Examination:  /80   Pulse 98   Resp 16   Ht 67\"   Wt (!) 374 lb (169.6 kg)   LMP 06/23/2024 (Approximate)   SpO2 98%   BMI 58.58 kg/m²   Mental status: A & O X 3  Language: no aphasia  Speech: no dysarthria  CN II-XII: intact   Motor strength: 5/5 all extremities  Tone: normal  Coordination: normal  Sensory: symmetric  Gait: normal    Test reviewed on 5/28/2025      Yue Santos MD  Neurology  [facility]  5/28/2025, 9:56 AM  CC: Santos Espinosa MD       [1]   Current  Outpatient Medications:     topiramate 100 MG Oral Tab, Take 1 tablet (100 mg total) by mouth 2 (two) times daily., Disp: 60 tablet, Rfl: 3    butalbital-acetaminophen-caffeine -40 MG Oral Tab, Take 1 tablet by mouth every 4 (four) hours as needed for Headaches., Disp: 10 tablet, Rfl: 0    dicyclomine 10 MG Oral Cap, Take 1 capsule (10 mg total) by mouth 3 (three) times daily before meals as needed., Disp: 90 capsule, Rfl: 0    amLODIPine 10 MG Oral Tab, daily., Disp: , Rfl:     losartan 100 MG Oral Tab, Take 1 tablet (100 mg total) by mouth daily., Disp: , Rfl:     hydroCHLOROthiazide 25 MG Oral Tab, Take 1 tablet (25 mg total) by mouth daily., Disp: , Rfl:     metoprolol succinate ER 50 MG Oral Tablet 24 Hr, Take 1 tablet (50 mg total) by mouth daily., Disp: , Rfl:   [2]   Allergies  Allergen Reactions    Bee Venom ANAPHYLAXIS    Triptans PALPITATIONS    Amoxicillin NAUSEA AND VOMITING and DIZZINESS    Penicillins NAUSEA AND VOMITING and DIZZINESS    Lisinopril Coughing and OTHER (SEE COMMENTS)   [3]   Past Medical History:   Anxiety    Asthma (HCC)    Back problem    Depression    Diastolic dysfunction    Extrinsic asthma, unspecified    High blood pressure    Lymphadenitis    Migraines    Obesity    Pituitary tumor    Pulmonary hypertension (HCC)    Seizure disorder (HCC)    Shortness of breath    Sleep apnea   [4]   Past Surgical History:  Procedure Laterality Date    Adenoidectomy      Colonoscopy N/A 7/10/2024    Procedure: COLONOSCOPY;  Surgeon: Sascha Gentile MD;  Location:  ENDOSCOPY    Other surgical history  9-4-13 [facility]     Incision and Drainage of Deep Left Axillary Abscess    Other surgical history  02/03/2023    ENDOSCOPIC ENDONASAL CEREBROSPINAL FLUID LEAK REPAIR WITH ABDOMINAL FAT GRAFT    Other surgical history      Other surgical history  02/10/2023    RIGHT OCCIPITAL VENTRICULOPERITONEAL SHUNT INSERTION    Tonsillectomy     [5]   Family History  Problem Relation Age of Onset     Heart Disorder Father     Hypertension Mother

## 2025-05-28 NOTE — PATIENT INSTRUCTIONS
Refill policies:    Allow 2-3 business days for refills; controlled substances may take longer.  Contact your pharmacy at least 5 days prior to running out of medication and have them send an electronic request or submit request through the “request refill” option in your [employer] account.  Refills are not addressed on weekends; covering physicians do not authorize routine medications on weekends.  No narcotics or controlled substances are refilled after noon on Fridays or by on call physicians.  By law, narcotics must be electronically prescribed.  A 30 day supply with no refills is the maximum allowed.  If your prescription is due for a refill, you may be due for a follow up appointment.  To best provide you care, patients receiving routine medications need to be seen at least once a year.  Patients receiving narcotic/controlled substance medications need to be seen at least once every 3 months.  In the event that your preferred pharmacy does not have the requested medication in stock (e.g. Backordered), it is your responsibility to find another pharmacy that has the requested medication available.  We will gladly send a new prescription to that pharmacy at your request.    Scheduling Tests:    If your physician has ordered radiology tests such as MRI or CT scans, please contact Central Scheduling at [phone] right away to schedule the test.  Once scheduled, the [facility] Centralized Referral Team will work with your insurance carrier to obtain pre-certification or prior authorization.  Depending on your insurance carrier, approval may take 3-10 days.  It is highly recommended patients assure they have received an authorization before having a test performed.  If test is done without insurance authorization, patient may be responsible for the entire amount billed.      Precertification and Prior Authorizations:  If your physician has recommended that you have a procedure or additional testing performed the [facility]  Centralized Referral Team will contact your insurance carrier to obtain pre-certification or prior authorization.    You are strongly encouraged to contact your insurance carrier to verify that your procedure/test has been approved and is a COVERED benefit.  Although the [facility] Centralized Referral Team does its due diligence, the insurance carrier gives the disclaimer that \"Although the procedure is authorized, this does not guarantee payment.\"    Ultimately the patient is responsible for payment.   Thank you for your understanding in this matter.  Paperwork Completion:  If you require FMLA or disability paperwork for your recovery, please make sure to either drop it off or have it faxed to our office at [phone]. Be sure the form has your name and date of birth on it.  The form will be faxed to our Forms Department and they will complete it for you.  There is a 25$ fee for all forms that need to be filled out.  Please be aware there is a 10-14 day turnaround time.  You will need to sign a release of information (MARTÍN) form if your paperwork does not come with one.  You may call the Forms Department with any questions at [phone].  Their fax number is [phone].

## 2025-08-06 ENCOUNTER — PATIENT MESSAGE (OUTPATIENT)
Dept: NEUROLOGY | Facility: CLINIC | Age: 40
End: 2025-08-06

## 2025-08-06 DIAGNOSIS — G43.019 INTRACTABLE MIGRAINE WITHOUT AURA AND WITHOUT STATUS MIGRAINOSUS: ICD-10-CM

## 2025-08-07 RX ORDER — BUTALBITAL, ACETAMINOPHEN AND CAFFEINE 50; 325; 40 MG/1; MG/1; MG/1
1 TABLET ORAL EVERY 4 HOURS PRN
Qty: 10 TABLET | Refills: 0 | Status: SHIPPED | OUTPATIENT
Start: 2025-08-07

## (undated) DEVICE — SUT VICRYL 2-0 CP-2 J762D

## (undated) DEVICE — STERILE POLYISOPRENE POWDER-FREE SURGICAL GLOVES: Brand: PROTEXIS

## (undated) DEVICE — Device

## (undated) DEVICE — BITEBLOCK ENDOSCP 60FR MAXI STRP

## (undated) DEVICE — TUBING 1895522 5PK STRAIGHTSHOT TO XPS: Brand: STRAIGHTSHOT®

## (undated) DEVICE — CODMAN® SURGICAL PATTIES 1/2" X 3" (1.27CM X 7.62CM): Brand: CODMAN®

## (undated) DEVICE — LIGHT HANDLE

## (undated) DEVICE — BIPOLAR FORCEPS INSERT, 2 MM, 20 CM: Brand: TAKE-APART

## (undated) DEVICE — E-Z BUTTON SWITCH PENCIL

## (undated) DEVICE — SUT PLAIN GUT 5-0 PC-1 1915G

## (undated) DEVICE — 1200CC GUARDIAN II: Brand: GUARDIAN

## (undated) DEVICE — HERMETIC™ LUMBAR CATHETER CLOSED TIP: Brand: HERMETIC™

## (undated) DEVICE — TUBING SUCTION COLLECTION SET

## (undated) DEVICE — NASAL PACKING CS3600-10 NOVAPAK 10PK STD: Brand: NOVAPAK

## (undated) DEVICE — SINUS CDS: Brand: MEDLINE INDUSTRIES, INC.

## (undated) DEVICE — SLEEVE KENDALL SCD EXPRESS MED

## (undated) DEVICE — 10FT COMBINED O2 DELIVERY/CO2 MONITORING. FILTER WITH MICROSTREAM TYPE LUER: Brand: DUAL ADULT NASAL CANNULA

## (undated) DEVICE — SPECIMEN CONTAINER,POSITIVE SEAL INDICATOR, OR PACKAGED: Brand: PRECISION

## (undated) DEVICE — CODMAN® SURGICAL PATTIES 1/2" X 1/2" (1.27CM X 1.27CM): Brand: CODMAN®

## (undated) DEVICE — KIT CUSTOM ENDOPROCEDURE STERIS

## (undated) DEVICE — Device: Brand: INTELLICART™

## (undated) DEVICE — 3M™ RED DOT™ MONITORING ELECTRODE WITH FOAM TAPE AND STICKY GEL, 50/BAG, 20/CASE, 72/PLT 2570: Brand: RED DOT™

## (undated) DEVICE — BALLOON HEMOSTATIC EUS LINEAR

## (undated) DEVICE — KIT VLV 5 PC AIR H2O SUCT BX ENDOGATOR CONN

## (undated) DEVICE — ENDOSCOPIC ULTRASOUND FINE NEEDLE BIOPSY (FNB) DEVICE: Brand: ACQUIRE

## (undated) DEVICE — GIJAW SINGLE-USE BIOPSY FORCEPS WITH NEEDLE: Brand: GIJAW

## (undated) DEVICE — 3M™ TEGADERM™ TRANSPARENT FILM DRESSING, 1626W, 4 IN X 4-3/4 IN (10 CM X 12 CM), 50 EACH/CARTON, 4 CARTON/CASE: Brand: 3M™ TEGADERM™

## (undated) DEVICE — STERILE SYNTHETIC POLYISOPRENE POWDER-FREE SURGICAL GLOVES WITH HYDROGEL COATING: Brand: PROTEXIS

## (undated) DEVICE — SOL NACL IRRIG 0.9% 1000ML BTL

## (undated) DEVICE — PEN SKIN MARKING REG TIP VIOLT

## (undated) DEVICE — COAGULATOR MEGADYNE 10F 6IN

## (undated) DEVICE — CURAD NONADHERANT PAD 3X8

## (undated) NOTE — ED AVS SNAPSHOT
[facility] Emergency Department    [facility] [address] [address]    Phone:  [phone]    Fax:  [phone]           Ms. Katrin Harris   MRN: [id number]    Department:  [facility] Emergency Department   Date of Visit: [phone]       To Check ER Wait Times:  TEXT 'ERwait' to 39276      Click [email or web address]      Or call [phone]    If you have any problems with your follow-up, please call our  at [phone]    Yassine oliver problema con I have read and understand the instructions given to me by my caregivers. 24-Hour Pharmacies        Pharmacy Address Phone Number   [facility] [address] ([address]) Lexy Maier Dictated by: Julee Denton MD on 3/11/2017 at 13:38       Approved by: Julee Denton MD              Narrative:    PROCEDURE:  CT BRAIN OR HEAD (20506)     COMPARISON:  None.      INDICATIONS:  arm weakness     TECHNIQUE:  Noncontrast CT scanning is performed t

## (undated) NOTE — ED AVS SNAPSHOT
[facility] Emergency Department    [facility] [address] [address]    Phone:  [phone]    Fax:  [phone]           Ms. Yajaira Moran   MRN: [id number]    Department:  [facility] Emergency Department   Date of Visit: IF THERE IS ANY CHANGE OR WORSENING OF YOUR CONDITION, CALL YOUR PRIMARY CARE PHYSICIAN AT ONCE OR RETURN IMMEDIATELY TO THE EMERGENCY DEPARTMENT.     If you have been prescribed any medication(s), please fill your prescription right away and begin taking t

## (undated) NOTE — LETTER
[address]  [address]  Authorization for Surgical Operation and Procedure     Date:___________                                                                                                         Time:__________  I hereby authorize Surgeon(s):  Sascha Gentile MD, my physician and his/her assistants (if applicable), which may include medical students, residents, and/or fellows, to perform the following surgical operation/ procedure and administer such anesthesia as may be determined necessary by my physician:  Operation/Procedure name (s) Procedure(s):  ESOPHAGOGASTRODUODENOSCOPY (EGD), ENDOSCOPIC ULTRASOUND (EUS), COLONOSCOPY  COLONOSCOPY  ENDOSCOPIC ULTRASOUND (EUS) on Honeydeanna Macias   2.   I recognize that during the surgical operation/procedure, unforeseen conditions may necessitate additional or different procedures than those listed above.  I, therefore, further authorize and request that the above-named surgeon, assistants, or designees perform such procedures as are, in their judgment, necessary and desirable.    3.   My surgeon/physician has discussed prior to my surgery the potential benefits, risks and side effects of this procedure; the likelihood of achieving goals; and potential problems that might occur during recuperation.  They also discussed reasonable alternatives to the procedure, including risks, benefits, and side effects related to the alternatives and risks related to not receiving this procedure.  I have had all my questions answered and I acknowledge that no guarantee has been made as to the result that may be obtained.    4.   Should the need arise during my operation/procedure, which includes change of level of care prior to discharge, I also consent to the administration of blood and/or blood products.  Further, I understand that despite careful testing and screening of blood or blood products by collecting agencies, I may still be subject  to ill effects as a result of receiving a blood transfusion and/or blood products.  The following are some, but not all, of the potential risks that can occur: fever and allergic reactions, hemolytic reactions, transmission of diseases such as Hepatitis, AIDS and Cytomegalovirus (CMV) and fluid overload.  In the event that I wish to have an autologous transfusion of my own blood, or a directed donor transfusion, I will discuss this with my physician.  Check only if Refusing Blood or Blood Products  I understand refusal of blood or blood products as deemed necessary by my physician may have serious consequences to my condition to include possible death. I hereby assume responsibility for my refusal and release the hospital, its personnel, and my physicians from any responsibility for the consequences of my refusal.          o  Refuse      5.   I authorize the use of any specimen, organs, tissues, body parts or foreign objects that may be removed from my body during the operation/procedure for diagnosis, research or teaching purposes and their subsequent disposal by hospital authorities.  I also authorize the release of specimen test results and/or written reports to my treating physician on the hospital medical staff or other referring or consulting physicians involved in my care, at the discretion of the Pathologist or my treating physician.    6.   I consent to the photographing or videotaping of the operations or procedures to be performed, including appropriate portions of my body for medical, scientific, or educational purposes, provided my identity is not revealed by the pictures or by descriptive texts accompanying them.  If the procedure has been photographed/videotaped, the surgeon will obtain the original picture, image, videotape or CD.  The hospital will not be responsible for storage, release or maintenance of the picture, image, tape or CD.    7.   I consent to the presence of a  or  observers in the operating room as deemed necessary by my physician or their designees.    8.   I recognize that in the event my procedure results in extended X-Ray/fluoroscopy time, I may develop a skin reaction.    9. If I have a Do Not Attempt Resuscitation (DNAR) order in place, that status will be suspended while in the operating room, procedural suite, and during the recovery period unless otherwise explicitly stated by me (or a person authorized to consent on my behalf). The surgeon or my attending physician will determine when the applicable recovery period ends for purposes of reinstating the DNAR order.  10. Patients having a sterilization procedure: I understand that if the procedure is successful the results will be permanent and it will therefore be impossible for me to inseminate, conceive, or bear children.  I also understand that the procedure is intended to result in sterility, although the result has not been guaranteed.   11. I acknowledge that my physician has explained sedation/analgesia administration to me including the risk and benefits I consent to the administration of sedation/analgesia as may be necessary or desirable in the judgment of my physician.    I CERTIFY THAT I HAVE READ AND FULLY UNDERSTAND THE ABOVE CONSENT TO OPERATION and/or OTHER PROCEDURE.    _________________________________________  __________________________________  Signature of Patient     Signature of Responsible Person         ___________________________________         Printed Name of Responsible Person           _________________________________                 Relationship to Patient  _________________________________________  ______________________________  Signature of Witness          Date  Time      Patient Name: Honey Macias     : 1985                 Printed: 2024     Medical Record #: [id number]                     Page 2 of 3                                    [address]  [place]  [address]    Consent for Anesthesia    IHoney agree to be cared for by an anesthesiologist, who is specially trained to monitor me and give me medicine to put me to sleep or keep me comfortable during my procedure    I understand that my anesthesiologist is not an employee or agent of [facility] or [employer]. He or she works for [employer]    As the patient asking for anesthesia services, I agree to:  Allow the anesthesiologist (anesthesia doctor) to give me medicine and do additional procedures as necessary. Some examples are: Starting or using an “IV” to give me medicine, fluids or blood during my procedure, and having a breathing tube placed to help me breathe when I’m asleep (intubation). In the event that my heart stops working properly, I understand that my anesthesiologist will make every effort to sustain my life, unless otherwise directed by [facility] Do Not Resuscitate documents.  Tell my anesthesia doctor before my procedure:  If I am pregnant.  The last time that I ate or drank.  All of the medicines I take (including prescriptions, herbal supplements, and pills I can buy without a prescription (including street drugs/illegal medications). Failure to inform my anesthesiologist about these medicines may increase my risk of anesthetic complications.  If I am allergic to anything or have had a reaction to anesthesia before.  I understand how the anesthesia medicine will help me (benefits).  I understand that with any type of anesthesia medicine there are risks:  The most common risks are: nausea, vomiting, sore throat, muscle soreness, damage to my eyes, mouth, or teeth (from breathing tube placement).  Rare risks include: remembering what happened during my procedure, allergic reactions to medications, injury to my airway, heart, lungs, vision, nerves, or muscles and in extremely rare instances death.  My doctor has  explained to me other choices available to me for my care (alternatives).  Pregnant Patients (“epidural”):  I understand that the risks of having an epidural (medicine given into my back to help control pain during labor), include itching, low blood pressure, difficulty urinating, headache or slowing of the baby’s heart. Very rare risks include infection, bleeding, seizure, irregular heart rhythms and nerve injury.  Regional Anesthesia (“spinal”, “epidural”, & “nerve blocks”):  I understand that rare but potential complications include headache, bleeding, infection, seizure, irregular heart rhythms, and nerve injury.    I can change my mind about having anesthesia services at any time before I get the medicine.    _____________________________________________________________________________  Patient (or Representative) Signature/Relationship to Patient  Date   Time    _____________________________________________________________________________   Name (if used)    Language/Organization   Time    _____________________________________________________________________________  Anesthesiologist Signature     Date   Time  I have discussed the procedure and information above with the patient (or patient’s representative) and answered their questions. The patient or their representative has agreed to have anesthesia services.    _____________________________________________________________________________  Witness        Date   Time  I have verified that the signature is that of the patient or patient’s representative, and that it was signed before the procedure  Patient Name: Honey Macias     : 1985                 Printed: 2024     Medical Record #: [id number]                     Page 3 of 3